# Patient Record
Sex: MALE | Race: ASIAN | Employment: OTHER | URBAN - METROPOLITAN AREA
[De-identification: names, ages, dates, MRNs, and addresses within clinical notes are randomized per-mention and may not be internally consistent; named-entity substitution may affect disease eponyms.]

---

## 2020-01-01 ENCOUNTER — MEDICAL CORRESPONDENCE (OUTPATIENT)
Dept: HEALTH INFORMATION MANAGEMENT | Facility: CLINIC | Age: 65
End: 2020-01-01

## 2020-01-01 ENCOUNTER — TRANSFERRED RECORDS (OUTPATIENT)
Dept: HEALTH INFORMATION MANAGEMENT | Facility: CLINIC | Age: 65
End: 2020-01-01

## 2020-01-01 LAB
ALT SERPL-CCNC: 25 U/L (ref 0–55)
AST SERPL-CCNC: 24 U/L (ref 10–40)
CREAT SERPL-MCNC: 0.88 MG/DL (ref 0.73–1.18)
GFR SERPL CREATININE-BSD FRML MDRD: >60 ML/MIN/1.73M2
GLUCOSE SERPL-MCNC: 156 MG/DL (ref 70–100)
HEP C HIM: NORMAL
POTASSIUM SERPL-SCNC: 3.6 MMOL/L (ref 3.5–5.1)

## 2021-01-01 ENCOUNTER — REFERRAL (OUTPATIENT)
Dept: TRANSPLANT | Facility: CLINIC | Age: 66
End: 2021-01-01

## 2021-01-01 ENCOUNTER — APPOINTMENT (OUTPATIENT)
Dept: GENERAL RADIOLOGY | Facility: CLINIC | Age: 66
DRG: 871 | End: 2021-01-01
Attending: INTERNAL MEDICINE
Payer: COMMERCIAL

## 2021-01-01 ENCOUNTER — DOCUMENTATION ONLY (OUTPATIENT)
Dept: TRANSPLANT | Facility: CLINIC | Age: 66
End: 2021-01-01

## 2021-01-01 ENCOUNTER — ANESTHESIA (OUTPATIENT)
Dept: INTENSIVE CARE | Facility: CLINIC | Age: 66
DRG: 871 | End: 2021-01-01
Payer: COMMERCIAL

## 2021-01-01 ENCOUNTER — APPOINTMENT (OUTPATIENT)
Dept: GENERAL RADIOLOGY | Facility: CLINIC | Age: 66
DRG: 871 | End: 2021-01-01
Attending: EMERGENCY MEDICINE
Payer: COMMERCIAL

## 2021-01-01 ENCOUNTER — HOSPITAL ENCOUNTER (OUTPATIENT)
Facility: CLINIC | Age: 66
Discharge: HOME | DRG: 871 | End: 2021-02-19
Attending: INTERNAL MEDICINE | Admitting: INTERNAL MEDICINE
Payer: COMMERCIAL

## 2021-01-01 ENCOUNTER — ANCILLARY PROCEDURE (OUTPATIENT)
Dept: MRI IMAGING | Facility: CLINIC | Age: 66
End: 2021-01-01
Attending: SURGERY
Payer: COMMERCIAL

## 2021-01-01 ENCOUNTER — APPOINTMENT (OUTPATIENT)
Dept: GENERAL RADIOLOGY | Facility: CLINIC | Age: 66
DRG: 871 | End: 2021-01-01
Payer: COMMERCIAL

## 2021-01-01 ENCOUNTER — PRE VISIT (OUTPATIENT)
Facility: CLINIC | Age: 66
End: 2021-01-01

## 2021-01-01 ENCOUNTER — APPOINTMENT (OUTPATIENT)
Dept: CT IMAGING | Facility: CLINIC | Age: 66
DRG: 871 | End: 2021-01-01
Payer: COMMERCIAL

## 2021-01-01 ENCOUNTER — ANESTHESIA EVENT (OUTPATIENT)
Dept: INTENSIVE CARE | Facility: CLINIC | Age: 66
DRG: 871 | End: 2021-01-01
Payer: COMMERCIAL

## 2021-01-01 ENCOUNTER — TELEPHONE (OUTPATIENT)
Dept: SURGERY | Facility: CLINIC | Age: 66
End: 2021-01-01

## 2021-01-01 ENCOUNTER — APPOINTMENT (OUTPATIENT)
Dept: CARDIOLOGY | Facility: CLINIC | Age: 66
DRG: 871 | End: 2021-01-01
Payer: COMMERCIAL

## 2021-01-01 ENCOUNTER — APPOINTMENT (OUTPATIENT)
Dept: LAB | Facility: CLINIC | Age: 66
End: 2021-01-01
Payer: COMMERCIAL

## 2021-01-01 ENCOUNTER — PRE VISIT (OUTPATIENT)
Dept: SURGERY | Facility: CLINIC | Age: 66
End: 2021-01-01

## 2021-01-01 ENCOUNTER — APPOINTMENT (OUTPATIENT)
Dept: CT IMAGING | Facility: CLINIC | Age: 66
DRG: 871 | End: 2021-01-01
Attending: INTERNAL MEDICINE
Payer: COMMERCIAL

## 2021-01-01 ENCOUNTER — HOSPITAL ENCOUNTER (INPATIENT)
Dept: GENERAL RADIOLOGY | Facility: CLINIC | Age: 66
End: 2021-01-12
Attending: INTERNAL MEDICINE
Payer: COMMERCIAL

## 2021-01-01 ENCOUNTER — TELEPHONE (OUTPATIENT)
Dept: TRANSPLANT | Facility: CLINIC | Age: 66
End: 2021-01-01

## 2021-01-01 ENCOUNTER — PATIENT OUTREACH (OUTPATIENT)
Dept: SURGERY | Facility: CLINIC | Age: 66
End: 2021-01-01

## 2021-01-01 ENCOUNTER — HEALTH MAINTENANCE LETTER (OUTPATIENT)
Age: 66
End: 2021-01-01

## 2021-01-01 ENCOUNTER — HOSPITAL ENCOUNTER (INPATIENT)
Facility: CLINIC | Age: 66
LOS: 3 days | DRG: 871 | End: 2021-02-24
Attending: INTERNAL MEDICINE | Admitting: HOSPITALIST
Payer: COMMERCIAL

## 2021-01-01 ENCOUNTER — PREP FOR PROCEDURE (OUTPATIENT)
Dept: GASTROENTEROLOGY | Facility: CLINIC | Age: 66
End: 2021-01-01

## 2021-01-01 ENCOUNTER — ANCILLARY PROCEDURE (OUTPATIENT)
Dept: CT IMAGING | Facility: CLINIC | Age: 66
End: 2021-01-01
Attending: SURGERY
Payer: COMMERCIAL

## 2021-01-01 ENCOUNTER — PATIENT OUTREACH (OUTPATIENT)
Dept: GASTROENTEROLOGY | Facility: CLINIC | Age: 66
End: 2021-01-01

## 2021-01-01 ENCOUNTER — VIRTUAL VISIT (OUTPATIENT)
Dept: SURGERY | Facility: CLINIC | Age: 66
End: 2021-01-01
Payer: COMMERCIAL

## 2021-01-01 ENCOUNTER — OFFICE VISIT (OUTPATIENT)
Dept: SURGERY | Facility: CLINIC | Age: 66
End: 2021-01-01
Attending: INTERNAL MEDICINE
Payer: COMMERCIAL

## 2021-01-01 ENCOUNTER — ANESTHESIA (OUTPATIENT)
Dept: SURGERY | Facility: CLINIC | Age: 66
DRG: 871 | End: 2021-01-01
Payer: COMMERCIAL

## 2021-01-01 ENCOUNTER — APPOINTMENT (OUTPATIENT)
Dept: INTERPRETER SERVICES | Facility: CLINIC | Age: 66
End: 2021-01-01
Payer: COMMERCIAL

## 2021-01-01 ENCOUNTER — ANESTHESIA EVENT (OUTPATIENT)
Dept: SURGERY | Facility: CLINIC | Age: 66
DRG: 871 | End: 2021-01-01
Payer: COMMERCIAL

## 2021-01-01 VITALS
OXYGEN SATURATION: 98 % | HEIGHT: 63 IN | RESPIRATION RATE: 25 BRPM | DIASTOLIC BLOOD PRESSURE: 44 MMHG | TEMPERATURE: 97.6 F | WEIGHT: 186.95 LBS | SYSTOLIC BLOOD PRESSURE: 77 MMHG | BODY MASS INDEX: 33.12 KG/M2

## 2021-01-01 VITALS
SYSTOLIC BLOOD PRESSURE: 137 MMHG | BODY MASS INDEX: 26.88 KG/M2 | WEIGHT: 151.68 LBS | HEART RATE: 66 BPM | RESPIRATION RATE: 16 BRPM | TEMPERATURE: 97.5 F | DIASTOLIC BLOOD PRESSURE: 79 MMHG | HEIGHT: 63 IN | OXYGEN SATURATION: 99 %

## 2021-01-01 VITALS
BODY MASS INDEX: 26.53 KG/M2 | RESPIRATION RATE: 16 BRPM | OXYGEN SATURATION: 96 % | DIASTOLIC BLOOD PRESSURE: 73 MMHG | HEIGHT: 64 IN | WEIGHT: 155.4 LBS | TEMPERATURE: 98.4 F | SYSTOLIC BLOOD PRESSURE: 137 MMHG | HEART RATE: 77 BPM

## 2021-01-01 VITALS — WEIGHT: 162 LBS | BODY MASS INDEX: 27.66 KG/M2 | HEIGHT: 64 IN

## 2021-01-01 DIAGNOSIS — N17.9 ACUTE RENAL FAILURE, UNSPECIFIED ACUTE RENAL FAILURE TYPE (H): ICD-10-CM

## 2021-01-01 DIAGNOSIS — R16.0 LIVER MASS: ICD-10-CM

## 2021-01-01 DIAGNOSIS — R16.0 LIVER MASS: Primary | ICD-10-CM

## 2021-01-01 DIAGNOSIS — C22.9 LIVER CANCER (H): Primary | ICD-10-CM

## 2021-01-01 DIAGNOSIS — K85.80 OTHER ACUTE PANCREATITIS, UNSPECIFIED COMPLICATION STATUS: ICD-10-CM

## 2021-01-01 DIAGNOSIS — C22.0 HCC (HEPATOCELLULAR CARCINOMA) (H): Primary | ICD-10-CM

## 2021-01-01 DIAGNOSIS — F17.200 TOBACCO USE DISORDER: ICD-10-CM

## 2021-01-01 DIAGNOSIS — Z11.59 ENCOUNTER FOR SCREENING FOR OTHER VIRAL DISEASES: ICD-10-CM

## 2021-01-01 DIAGNOSIS — F17.200 TOBACCO USE DISORDER: Primary | ICD-10-CM

## 2021-01-01 DIAGNOSIS — C22.1 CHOLANGIOCARCINOMA (H): ICD-10-CM

## 2021-01-01 DIAGNOSIS — Z11.52 ENCOUNTER FOR SCREENING LABORATORY TESTING FOR SEVERE ACUTE RESPIRATORY SYNDROME CORONAVIRUS 2 (SARS-COV-2): ICD-10-CM

## 2021-01-01 DIAGNOSIS — Z01.818 PREOP EXAMINATION: Primary | ICD-10-CM

## 2021-01-01 DIAGNOSIS — R16.0 LIVER MASS, LEFT LOBE: Primary | ICD-10-CM

## 2021-01-01 DIAGNOSIS — K83.09 CHOLANGITIS (H): ICD-10-CM

## 2021-01-01 DIAGNOSIS — Z11.59 ENCOUNTER FOR SCREENING FOR OTHER VIRAL DISEASES: Primary | ICD-10-CM

## 2021-01-01 DIAGNOSIS — C22.9 LIVER CANCER (H): ICD-10-CM

## 2021-01-01 LAB
ABO + RH BLD: NORMAL
ABO + RH BLD: NORMAL
ALBUMIN SERPL-MCNC: 1.1 G/DL (ref 3.4–5)
ALBUMIN SERPL-MCNC: 1.1 G/DL (ref 3.4–5)
ALBUMIN SERPL-MCNC: 1.4 G/DL (ref 3.4–5)
ALBUMIN SERPL-MCNC: 1.7 G/DL (ref 3.4–5)
ALBUMIN SERPL-MCNC: 2.2 G/DL (ref 3.4–5)
ALBUMIN SERPL-MCNC: 3.3 G/DL (ref 3.4–5)
ALBUMIN UR-MCNC: 30 MG/DL
ALP SERPL-CCNC: 126 U/L (ref 40–150)
ALP SERPL-CCNC: 133 U/L (ref 40–150)
ALP SERPL-CCNC: 161 U/L (ref 40–150)
ALP SERPL-CCNC: 243 U/L (ref 40–150)
ALP SERPL-CCNC: 291 U/L (ref 40–150)
ALP SERPL-CCNC: 413 U/L (ref 40–150)
ALT SERPL W P-5'-P-CCNC: 1020 U/L (ref 0–70)
ALT SERPL W P-5'-P-CCNC: 2123 U/L (ref 0–70)
ALT SERPL W P-5'-P-CCNC: 254 U/L (ref 0–70)
ALT SERPL W P-5'-P-CCNC: 76 U/L (ref 0–70)
ALT SERPL W P-5'-P-CCNC: 80 U/L (ref 0–70)
ALT SERPL W P-5'-P-CCNC: 86 U/L (ref 0–70)
AMORPH CRY #/AREA URNS HPF: ABNORMAL /HPF
AMYLASE SERPL-CCNC: 89 U/L (ref 30–110)
ANION GAP SERPL CALCULATED.3IONS-SCNC: 11 MMOL/L (ref 3–14)
ANION GAP SERPL CALCULATED.3IONS-SCNC: 12 MMOL/L (ref 3–14)
ANION GAP SERPL CALCULATED.3IONS-SCNC: 13 MMOL/L (ref 3–14)
ANION GAP SERPL CALCULATED.3IONS-SCNC: 14 MMOL/L (ref 3–14)
ANION GAP SERPL CALCULATED.3IONS-SCNC: 14 MMOL/L (ref 3–14)
ANION GAP SERPL CALCULATED.3IONS-SCNC: 15 MMOL/L (ref 3–14)
ANION GAP SERPL CALCULATED.3IONS-SCNC: 15 MMOL/L (ref 3–14)
ANION GAP SERPL CALCULATED.3IONS-SCNC: 17 MMOL/L (ref 3–14)
ANION GAP SERPL CALCULATED.3IONS-SCNC: 17 MMOL/L (ref 3–14)
ANION GAP SERPL CALCULATED.3IONS-SCNC: 6 MMOL/L (ref 3–14)
ANION GAP SERPL CALCULATED.3IONS-SCNC: 9 MMOL/L (ref 3–14)
ANION GAP SERPL CALCULATED.3IONS-SCNC: 9 MMOL/L (ref 3–14)
APPEARANCE UR: ABNORMAL
APTT PPP: 41 SEC (ref 22–37)
AST SERPL W P-5'-P-CCNC: 112 U/L (ref 0–45)
AST SERPL W P-5'-P-CCNC: 139 U/L (ref 0–45)
AST SERPL W P-5'-P-CCNC: 139 U/L (ref 0–45)
AST SERPL W P-5'-P-CCNC: 159 U/L (ref 0–45)
AST SERPL W P-5'-P-CCNC: 2194 U/L (ref 0–45)
AST SERPL W P-5'-P-CCNC: 5930 U/L (ref 0–45)
BACTERIA SPEC CULT: NO GROWTH
BASE DEFICIT BLDA-SCNC: 10.3 MMOL/L
BASE DEFICIT BLDA-SCNC: 10.9 MMOL/L
BASE DEFICIT BLDA-SCNC: 12.2 MMOL/L
BASE DEFICIT BLDA-SCNC: 12.4 MMOL/L
BASE DEFICIT BLDA-SCNC: 12.7 MMOL/L
BASE DEFICIT BLDA-SCNC: 14.2 MMOL/L
BASE DEFICIT BLDA-SCNC: 14.8 MMOL/L
BASE DEFICIT BLDA-SCNC: 15.2 MMOL/L
BASE DEFICIT BLDA-SCNC: 15.4 MMOL/L
BASE DEFICIT BLDA-SCNC: 15.8 MMOL/L
BASE DEFICIT BLDA-SCNC: 4.9 MMOL/L
BASE DEFICIT BLDA-SCNC: 8.3 MMOL/L
BASE DEFICIT BLDA-SCNC: 8.4 MMOL/L
BASE DEFICIT BLDA-SCNC: 9 MMOL/L
BASE DEFICIT BLDA-SCNC: 9.5 MMOL/L
BASE DEFICIT BLDA-SCNC: 9.5 MMOL/L
BASE DEFICIT BLDA-SCNC: 9.9 MMOL/L
BASE DEFICIT BLDV-SCNC: 10.3 MMOL/L
BASE DEFICIT BLDV-SCNC: 11.6 MMOL/L
BASE DEFICIT BLDV-SCNC: 11.8 MMOL/L
BASE DEFICIT BLDV-SCNC: 12.4 MMOL/L
BASE DEFICIT BLDV-SCNC: 14.6 MMOL/L
BASE DEFICIT BLDV-SCNC: 16.9 MMOL/L
BASE DEFICIT BLDV-SCNC: 7.5 MMOL/L
BASE DEFICIT BLDV-SCNC: 7.8 MMOL/L
BASE DEFICIT BLDV-SCNC: 8.5 MMOL/L
BASOPHILS # BLD AUTO: 0 10E9/L (ref 0–0.2)
BASOPHILS # BLD AUTO: 0 10E9/L (ref 0–0.2)
BASOPHILS NFR BLD AUTO: 0 %
BASOPHILS NFR BLD AUTO: 0.3 %
BILIRUB DIRECT SERPL-MCNC: 6.2 MG/DL (ref 0–0.2)
BILIRUB SERPL-MCNC: 10.3 MG/DL (ref 0.2–1.3)
BILIRUB SERPL-MCNC: 5.1 MG/DL (ref 0.2–1.3)
BILIRUB SERPL-MCNC: 7.5 MG/DL (ref 0.2–1.3)
BILIRUB SERPL-MCNC: 7.6 MG/DL (ref 0.2–1.3)
BILIRUB SERPL-MCNC: 8.6 MG/DL (ref 0.2–1.3)
BILIRUB SERPL-MCNC: 9.2 MG/DL (ref 0.2–1.3)
BILIRUB UR QL STRIP: ABNORMAL
BLD GP AB SCN SERPL QL: NORMAL
BLOOD BANK CMNT PATIENT-IMP: NORMAL
BUN SERPL-MCNC: 12 MG/DL (ref 7–30)
BUN SERPL-MCNC: 26 MG/DL (ref 7–30)
BUN SERPL-MCNC: 28 MG/DL (ref 7–30)
BUN SERPL-MCNC: 29 MG/DL (ref 7–30)
BUN SERPL-MCNC: 30 MG/DL (ref 7–30)
BUN SERPL-MCNC: 32 MG/DL (ref 7–30)
BUN SERPL-MCNC: 36 MG/DL (ref 7–30)
BUN SERPL-MCNC: 38 MG/DL (ref 7–30)
BUN SERPL-MCNC: 42 MG/DL (ref 7–30)
BUN SERPL-MCNC: 43 MG/DL (ref 7–30)
BUN SERPL-MCNC: 45 MG/DL (ref 7–30)
BUN SERPL-MCNC: 45 MG/DL (ref 7–30)
BUN SERPL-MCNC: 46 MG/DL (ref 7–30)
BUN SERPL-MCNC: 48 MG/DL (ref 7–30)
CA-I BLD-MCNC: 2.2 MG/DL (ref 4.4–5.2)
CA-I BLD-MCNC: 3.2 MG/DL (ref 4.4–5.2)
CA-I BLD-MCNC: 3.3 MG/DL (ref 4.4–5.2)
CA-I BLD-MCNC: 3.6 MG/DL (ref 4.4–5.2)
CA-I BLD-MCNC: 4 MG/DL (ref 4.4–5.2)
CA-I BLD-MCNC: 4 MG/DL (ref 4.4–5.2)
CA-I BLD-MCNC: 4.2 MG/DL (ref 4.4–5.2)
CA-I BLD-MCNC: 4.3 MG/DL (ref 4.4–5.2)
CA-I BLD-MCNC: 4.3 MG/DL (ref 4.4–5.2)
CA-I SERPL ISE-MCNC: 3.8 MG/DL (ref 4.4–5.2)
CA-I SERPL ISE-MCNC: 4 MG/DL (ref 4.4–5.2)
CA-I SERPL ISE-MCNC: 4.1 MG/DL (ref 4.4–5.2)
CA-I SERPL ISE-MCNC: 4.2 MG/DL (ref 4.4–5.2)
CALCIUM SERPL-MCNC: 6 MG/DL (ref 8.5–10.1)
CALCIUM SERPL-MCNC: 6.2 MG/DL (ref 8.5–10.1)
CALCIUM SERPL-MCNC: 6.5 MG/DL (ref 8.5–10.1)
CALCIUM SERPL-MCNC: 6.6 MG/DL (ref 8.5–10.1)
CALCIUM SERPL-MCNC: 7 MG/DL (ref 8.5–10.1)
CALCIUM SERPL-MCNC: 7.4 MG/DL (ref 8.5–10.1)
CALCIUM SERPL-MCNC: 7.5 MG/DL (ref 8.5–10.1)
CALCIUM SERPL-MCNC: 7.5 MG/DL (ref 8.5–10.1)
CALCIUM SERPL-MCNC: 7.6 MG/DL (ref 8.5–10.1)
CALCIUM SERPL-MCNC: 7.8 MG/DL (ref 8.5–10.1)
CALCIUM SERPL-MCNC: 7.8 MG/DL (ref 8.5–10.1)
CALCIUM SERPL-MCNC: 7.9 MG/DL (ref 8.5–10.1)
CALCIUM SERPL-MCNC: 8 MG/DL (ref 8.5–10.1)
CALCIUM SERPL-MCNC: 9.1 MG/DL (ref 8.5–10.1)
CHLORIDE SERPL-SCNC: 100 MMOL/L (ref 94–109)
CHLORIDE SERPL-SCNC: 102 MMOL/L (ref 94–109)
CHLORIDE SERPL-SCNC: 104 MMOL/L (ref 94–109)
CHLORIDE SERPL-SCNC: 107 MMOL/L (ref 94–109)
CHLORIDE SERPL-SCNC: 108 MMOL/L (ref 94–109)
CHLORIDE SERPL-SCNC: 109 MMOL/L (ref 94–109)
CHLORIDE SERPL-SCNC: 109 MMOL/L (ref 94–109)
CHLORIDE SERPL-SCNC: 110 MMOL/L (ref 94–109)
CHLORIDE SERPL-SCNC: 98 MMOL/L (ref 94–109)
CK SERPL-CCNC: 1600 U/L (ref 30–300)
CK SERPL-CCNC: 2273 U/L (ref 30–300)
CK SERPL-CCNC: 2802 U/L (ref 30–300)
CK SERPL-CCNC: NORMAL U/L (ref 30–300)
CO2 SERPL-SCNC: 12 MMOL/L (ref 20–32)
CO2 SERPL-SCNC: 12 MMOL/L (ref 20–32)
CO2 SERPL-SCNC: 13 MMOL/L (ref 20–32)
CO2 SERPL-SCNC: 13 MMOL/L (ref 20–32)
CO2 SERPL-SCNC: 14 MMOL/L (ref 20–32)
CO2 SERPL-SCNC: 14 MMOL/L (ref 20–32)
CO2 SERPL-SCNC: 15 MMOL/L (ref 20–32)
CO2 SERPL-SCNC: 16 MMOL/L (ref 20–32)
CO2 SERPL-SCNC: 16 MMOL/L (ref 20–32)
CO2 SERPL-SCNC: 17 MMOL/L (ref 20–32)
CO2 SERPL-SCNC: 18 MMOL/L (ref 20–32)
CO2 SERPL-SCNC: 20 MMOL/L (ref 20–32)
CO2 SERPL-SCNC: 20 MMOL/L (ref 20–32)
CO2 SERPL-SCNC: 25 MMOL/L (ref 20–32)
COLOR UR AUTO: ABNORMAL
COPATH REPORT: NORMAL
CREAT SERPL-MCNC: 0.96 MG/DL (ref 0.66–1.25)
CREAT SERPL-MCNC: 1 MG/DL (ref 0.66–1.25)
CREAT SERPL-MCNC: 1.69 MG/DL (ref 0.66–1.25)
CREAT SERPL-MCNC: 1.76 MG/DL (ref 0.66–1.25)
CREAT SERPL-MCNC: 1.85 MG/DL (ref 0.66–1.25)
CREAT SERPL-MCNC: 1.89 MG/DL (ref 0.66–1.25)
CREAT SERPL-MCNC: 2.05 MG/DL (ref 0.66–1.25)
CREAT SERPL-MCNC: 2.16 MG/DL (ref 0.66–1.25)
CREAT SERPL-MCNC: 2.37 MG/DL (ref 0.66–1.25)
CREAT SERPL-MCNC: 2.65 MG/DL (ref 0.66–1.25)
CREAT SERPL-MCNC: 2.65 MG/DL (ref 0.66–1.25)
CREAT SERPL-MCNC: 2.88 MG/DL (ref 0.66–1.25)
CREAT SERPL-MCNC: 2.92 MG/DL (ref 0.66–1.25)
CREAT SERPL-MCNC: 3.08 MG/DL (ref 0.66–1.25)
CREAT SERPL-MCNC: 3.63 MG/DL (ref 0.66–1.25)
CREAT UR-MCNC: 150 MG/DL
CRP SERPL-MCNC: 310 MG/L (ref 0–8)
CRP SERPL-MCNC: 350 MG/L (ref 0–8)
DIFFERENTIAL METHOD BLD: ABNORMAL
DIFFERENTIAL METHOD BLD: ABNORMAL
EOSINOPHIL # BLD AUTO: 0 10E9/L (ref 0–0.7)
EOSINOPHIL # BLD AUTO: 0 10E9/L (ref 0–0.7)
EOSINOPHIL NFR BLD AUTO: 0 %
EOSINOPHIL NFR BLD AUTO: 0.6 %
ERCP: NORMAL
ERYTHROCYTE [DISTWIDTH] IN BLOOD BY AUTOMATED COUNT: 12.5 % (ref 10–15)
ERYTHROCYTE [DISTWIDTH] IN BLOOD BY AUTOMATED COUNT: 14.4 % (ref 10–15)
ERYTHROCYTE [DISTWIDTH] IN BLOOD BY AUTOMATED COUNT: 14.6 % (ref 10–15)
ERYTHROCYTE [DISTWIDTH] IN BLOOD BY AUTOMATED COUNT: 15.1 % (ref 10–15)
ERYTHROCYTE [DISTWIDTH] IN BLOOD BY AUTOMATED COUNT: 15.3 % (ref 10–15)
FIBRINOGEN PPP-MCNC: 599 MG/DL (ref 200–420)
FIBRINOGEN PPP-MCNC: 638 MG/DL (ref 200–420)
FLUAV RNA RESP QL NAA+PROBE: NEGATIVE
FLUBV RNA RESP QL NAA+PROBE: NEGATIVE
GFR SERPL CREATININE-BSD FRML MDRD: 16 ML/MIN/{1.73_M2}
GFR SERPL CREATININE-BSD FRML MDRD: 20 ML/MIN/{1.73_M2}
GFR SERPL CREATININE-BSD FRML MDRD: 21 ML/MIN/{1.73_M2}
GFR SERPL CREATININE-BSD FRML MDRD: 21 ML/MIN/{1.73_M2}
GFR SERPL CREATININE-BSD FRML MDRD: 24 ML/MIN/{1.73_M2}
GFR SERPL CREATININE-BSD FRML MDRD: 24 ML/MIN/{1.73_M2}
GFR SERPL CREATININE-BSD FRML MDRD: 27 ML/MIN/{1.73_M2}
GFR SERPL CREATININE-BSD FRML MDRD: 30 ML/MIN/{1.73_M2}
GFR SERPL CREATININE-BSD FRML MDRD: 32 ML/MIN/{1.73_M2}
GFR SERPL CREATININE-BSD FRML MDRD: 36 ML/MIN/{1.73_M2}
GFR SERPL CREATININE-BSD FRML MDRD: 37 ML/MIN/{1.73_M2}
GFR SERPL CREATININE-BSD FRML MDRD: 39 ML/MIN/{1.73_M2}
GFR SERPL CREATININE-BSD FRML MDRD: 41 ML/MIN/{1.73_M2}
GFR SERPL CREATININE-BSD FRML MDRD: 77 ML/MIN/{1.73_M2}
GFR SERPL CREATININE-BSD FRML MDRD: 81 ML/MIN/{1.73_M2}
GLUCOSE BLDC GLUCOMTR-MCNC: 128 MG/DL (ref 70–99)
GLUCOSE SERPL-MCNC: 100 MG/DL (ref 70–99)
GLUCOSE SERPL-MCNC: 108 MG/DL (ref 70–99)
GLUCOSE SERPL-MCNC: 110 MG/DL (ref 70–99)
GLUCOSE SERPL-MCNC: 110 MG/DL (ref 70–99)
GLUCOSE SERPL-MCNC: 115 MG/DL (ref 70–99)
GLUCOSE SERPL-MCNC: 117 MG/DL (ref 70–99)
GLUCOSE SERPL-MCNC: 120 MG/DL (ref 70–99)
GLUCOSE SERPL-MCNC: 126 MG/DL (ref 70–99)
GLUCOSE SERPL-MCNC: 126 MG/DL (ref 70–99)
GLUCOSE SERPL-MCNC: 152 MG/DL (ref 70–99)
GLUCOSE SERPL-MCNC: 67 MG/DL (ref 70–99)
GLUCOSE SERPL-MCNC: 79 MG/DL (ref 70–99)
GLUCOSE SERPL-MCNC: 94 MG/DL (ref 70–99)
GLUCOSE SERPL-MCNC: 95 MG/DL (ref 70–99)
GLUCOSE UR STRIP-MCNC: NEGATIVE MG/DL
GRAM STN SPEC: NORMAL
GRAM STN SPEC: NORMAL
GRAN CASTS #/AREA URNS LPF: 7 /LPF
HBA1C MFR BLD: 5.8 % (ref 0–5.6)
HCO3 BLD-SCNC: 10 MMOL/L (ref 21–28)
HCO3 BLD-SCNC: 11 MMOL/L (ref 21–28)
HCO3 BLD-SCNC: 12 MMOL/L (ref 21–28)
HCO3 BLD-SCNC: 12 MMOL/L (ref 21–28)
HCO3 BLD-SCNC: 13 MMOL/L (ref 21–28)
HCO3 BLD-SCNC: 13 MMOL/L (ref 21–28)
HCO3 BLD-SCNC: 14 MMOL/L (ref 21–28)
HCO3 BLD-SCNC: 16 MMOL/L (ref 21–28)
HCO3 BLD-SCNC: 17 MMOL/L (ref 21–28)
HCO3 BLD-SCNC: 17 MMOL/L (ref 21–28)
HCO3 BLD-SCNC: 20 MMOL/L (ref 21–28)
HCO3 BLDV-SCNC: 11 MMOL/L (ref 21–28)
HCO3 BLDV-SCNC: 13 MMOL/L (ref 21–28)
HCO3 BLDV-SCNC: 14 MMOL/L (ref 21–28)
HCO3 BLDV-SCNC: 15 MMOL/L (ref 21–28)
HCO3 BLDV-SCNC: 15 MMOL/L (ref 21–28)
HCO3 BLDV-SCNC: 16 MMOL/L (ref 21–28)
HCO3 BLDV-SCNC: 18 MMOL/L (ref 21–28)
HCT VFR BLD AUTO: 27.7 % (ref 40–53)
HCT VFR BLD AUTO: 27.7 % (ref 40–53)
HCT VFR BLD AUTO: 34.4 % (ref 40–53)
HCT VFR BLD AUTO: 38.7 % (ref 40–53)
HCT VFR BLD AUTO: 41.2 % (ref 40–53)
HGB BLD-MCNC: 11.8 G/DL (ref 13.3–17.7)
HGB BLD-MCNC: 12.7 G/DL (ref 13.3–17.7)
HGB BLD-MCNC: 13.5 G/DL (ref 13.3–17.7)
HGB BLD-MCNC: 9.5 G/DL (ref 13.3–17.7)
HGB BLD-MCNC: 9.8 G/DL (ref 13.3–17.7)
HGB UR QL STRIP: ABNORMAL
HYALINE CASTS #/AREA URNS LPF: 20 /LPF (ref 0–2)
IMM GRANULOCYTES # BLD: 0 10E9/L (ref 0–0.4)
IMM GRANULOCYTES NFR BLD: 0.3 %
INR PPP: 1.41 (ref 0.86–1.14)
INR PPP: 2.95 (ref 0.86–1.14)
INTERPRETATION ECG - MUSE: NORMAL
KETONES UR STRIP-MCNC: NEGATIVE MG/DL
LABORATORY COMMENT REPORT: NORMAL
LABORATORY COMMENT REPORT: NORMAL
LACTATE BLD-SCNC: 10.1 MMOL/L (ref 0.7–2)
LACTATE BLD-SCNC: 10.3 MMOL/L (ref 0.7–2)
LACTATE BLD-SCNC: 10.6 MMOL/L (ref 0.7–2)
LACTATE BLD-SCNC: 12 MMOL/L (ref 0.7–2)
LACTATE BLD-SCNC: 12.4 MMOL/L (ref 0.7–2)
LACTATE BLD-SCNC: 13.4 MMOL/L (ref 0.7–2)
LACTATE BLD-SCNC: 3.2 MMOL/L (ref 0.7–2)
LACTATE BLD-SCNC: 4.3 MMOL/L (ref 0.7–2)
LACTATE BLD-SCNC: 4.8 MMOL/L (ref 0.7–2)
LACTATE BLD-SCNC: 5 MMOL/L (ref 0.7–2)
LACTATE BLD-SCNC: 5.2 MMOL/L (ref 0.7–2)
LACTATE BLD-SCNC: 5.2 MMOL/L (ref 0.7–2)
LACTATE BLD-SCNC: 5.3 MMOL/L (ref 0.7–2)
LACTATE BLD-SCNC: 5.5 MMOL/L (ref 0.7–2)
LACTATE BLD-SCNC: 5.8 MMOL/L (ref 0.7–2)
LACTATE BLD-SCNC: 5.9 MMOL/L (ref 0.7–2)
LACTATE BLD-SCNC: 6 MMOL/L (ref 0.7–2)
LACTATE BLD-SCNC: 6.1 MMOL/L (ref 0.7–2)
LACTATE BLD-SCNC: 6.7 MMOL/L (ref 0.7–2)
LACTATE BLD-SCNC: 7.5 MMOL/L (ref 0.7–2)
LACTATE BLD-SCNC: 7.6 MMOL/L (ref 0.7–2)
LACTATE BLD-SCNC: 8.2 MMOL/L (ref 0.7–2)
LACTATE BLD-SCNC: 8.4 MMOL/L (ref 0.7–2)
LACTATE BLD-SCNC: NORMAL MMOL/L (ref 0.7–2)
LACTATE BLD-SCNC: NORMAL MMOL/L (ref 0.7–2)
LEUKOCYTE ESTERASE UR QL STRIP: NEGATIVE
LIPASE SERPL-CCNC: 1074 U/L (ref 73–393)
LIPASE SERPL-CCNC: 4183 U/L (ref 73–393)
LYMPHOCYTES # BLD AUTO: 1.2 10E9/L (ref 0.8–5.3)
LYMPHOCYTES # BLD AUTO: 1.3 10E9/L (ref 0.8–5.3)
LYMPHOCYTES NFR BLD AUTO: 18.3 %
LYMPHOCYTES NFR BLD AUTO: 19 %
MAGNESIUM SERPL-MCNC: 1.5 MG/DL (ref 1.6–2.3)
MAGNESIUM SERPL-MCNC: 2.1 MG/DL (ref 1.6–2.3)
MAGNESIUM SERPL-MCNC: 2.3 MG/DL (ref 1.6–2.3)
MAGNESIUM SERPL-MCNC: 2.4 MG/DL (ref 1.6–2.3)
MAGNESIUM SERPL-MCNC: 2.5 MG/DL (ref 1.6–2.3)
MAGNESIUM SERPL-MCNC: 2.5 MG/DL (ref 1.6–2.3)
MAGNESIUM SERPL-MCNC: 2.8 MG/DL (ref 1.6–2.3)
MCH RBC QN AUTO: 30.7 PG (ref 26.5–33)
MCH RBC QN AUTO: 31.2 PG (ref 26.5–33)
MCH RBC QN AUTO: 31.3 PG (ref 26.5–33)
MCH RBC QN AUTO: 31.6 PG (ref 26.5–33)
MCH RBC QN AUTO: 31.7 PG (ref 26.5–33)
MCHC RBC AUTO-ENTMCNC: 32.8 G/DL (ref 31.5–36.5)
MCHC RBC AUTO-ENTMCNC: 32.8 G/DL (ref 31.5–36.5)
MCHC RBC AUTO-ENTMCNC: 34.3 G/DL (ref 31.5–36.5)
MCHC RBC AUTO-ENTMCNC: 34.3 G/DL (ref 31.5–36.5)
MCHC RBC AUTO-ENTMCNC: 35.4 G/DL (ref 31.5–36.5)
MCV RBC AUTO: 87 FL (ref 78–100)
MCV RBC AUTO: 91 FL (ref 78–100)
MCV RBC AUTO: 92 FL (ref 78–100)
MCV RBC AUTO: 95 FL (ref 78–100)
MCV RBC AUTO: 96 FL (ref 78–100)
METAMYELOCYTES # BLD: 0.7 10E9/L
METAMYELOCYTES NFR BLD MANUAL: 10.5 %
MONOCYTES # BLD AUTO: 0.8 10E9/L (ref 0–1.3)
MONOCYTES # BLD AUTO: 0.9 10E9/L (ref 0–1.3)
MONOCYTES NFR BLD AUTO: 12.1 %
MONOCYTES NFR BLD AUTO: 13.5 %
MUCOUS THREADS #/AREA URNS LPF: PRESENT /LPF
MYELOCYTES # BLD: 0.3 10E9/L
MYELOCYTES NFR BLD MANUAL: 4.5 %
NEUTROPHILS # BLD AUTO: 3.6 10E9/L (ref 1.6–8.3)
NEUTROPHILS # BLD AUTO: 4.3 10E9/L (ref 1.6–8.3)
NEUTROPHILS NFR BLD AUTO: 52.5 %
NEUTROPHILS NFR BLD AUTO: 68.4 %
NITRATE UR QL: NEGATIVE
NRBC # BLD AUTO: 0 10*3/UL
NRBC BLD AUTO-RTO: 0 /100
O2/TOTAL GAS SETTING VFR VENT: 40 %
O2/TOTAL GAS SETTING VFR VENT: 50 %
O2/TOTAL GAS SETTING VFR VENT: 60 %
O2/TOTAL GAS SETTING VFR VENT: 65 %
O2/TOTAL GAS SETTING VFR VENT: 65 %
O2/TOTAL GAS SETTING VFR VENT: 70 %
OSMOLALITY SERPL: NORMAL MMOL/KG (ref 280–301)
OSMOLALITY UR: 442 MMOL/KG (ref 100–1200)
OXYHGB MFR BLDV: 59 %
OXYHGB MFR BLDV: 59 %
OXYHGB MFR BLDV: 63 %
OXYHGB MFR BLDV: 64 %
OXYHGB MFR BLDV: 65 %
OXYHGB MFR BLDV: 67 %
OXYHGB MFR BLDV: 67 %
OXYHGB MFR BLDV: 68 %
OXYHGB MFR BLDV: 70 %
PCO2 BLD: 23 MM HG (ref 35–45)
PCO2 BLD: 25 MM HG (ref 35–45)
PCO2 BLD: 26 MM HG (ref 35–45)
PCO2 BLD: 27 MM HG (ref 35–45)
PCO2 BLD: 30 MM HG (ref 35–45)
PCO2 BLD: 30 MM HG (ref 35–45)
PCO2 BLD: 31 MM HG (ref 35–45)
PCO2 BLD: 32 MM HG (ref 35–45)
PCO2 BLD: 33 MM HG (ref 35–45)
PCO2 BLD: 33 MM HG (ref 35–45)
PCO2 BLD: 34 MM HG (ref 35–45)
PCO2 BLD: 34 MM HG (ref 35–45)
PCO2 BLD: 35 MM HG (ref 35–45)
PCO2 BLD: 36 MM HG (ref 35–45)
PCO2 BLDV: 31 MM HG (ref 40–50)
PCO2 BLDV: 31 MM HG (ref 40–50)
PCO2 BLDV: 36 MM HG (ref 40–50)
PCO2 BLDV: 36 MM HG (ref 40–50)
PCO2 BLDV: 37 MM HG (ref 40–50)
PCO2 BLDV: 38 MM HG (ref 40–50)
PH BLD: 7.17 PH (ref 7.35–7.45)
PH BLD: 7.19 PH (ref 7.35–7.45)
PH BLD: 7.2 PH (ref 7.35–7.45)
PH BLD: 7.23 PH (ref 7.35–7.45)
PH BLD: 7.24 PH (ref 7.35–7.45)
PH BLD: 7.24 PH (ref 7.35–7.45)
PH BLD: 7.25 PH (ref 7.35–7.45)
PH BLD: 7.27 PH (ref 7.35–7.45)
PH BLD: 7.29 PH (ref 7.35–7.45)
PH BLD: 7.31 PH (ref 7.35–7.45)
PH BLD: 7.31 PH (ref 7.35–7.45)
PH BLD: 7.32 PH (ref 7.35–7.45)
PH BLD: 7.34 PH (ref 7.35–7.45)
PH BLD: 7.34 PH (ref 7.35–7.45)
PH BLD: 7.37 PH (ref 7.35–7.45)
PH BLDV: 7.15 PH (ref 7.32–7.43)
PH BLDV: 7.15 PH (ref 7.32–7.43)
PH BLDV: 7.21 PH (ref 7.32–7.43)
PH BLDV: 7.22 PH (ref 7.32–7.43)
PH BLDV: 7.26 PH (ref 7.32–7.43)
PH BLDV: 7.27 PH (ref 7.32–7.43)
PH BLDV: 7.29 PH (ref 7.32–7.43)
PH BLDV: 7.3 PH (ref 7.32–7.43)
PH BLDV: 7.31 PH (ref 7.32–7.43)
PH UR STRIP: 5.5 PH (ref 5–7)
PHOSPHATE SERPL-MCNC: 5.2 MG/DL (ref 2.5–4.5)
PHOSPHATE SERPL-MCNC: 5.6 MG/DL (ref 2.5–4.5)
PHOSPHATE SERPL-MCNC: 6.1 MG/DL (ref 2.5–4.5)
PHOSPHATE SERPL-MCNC: 7 MG/DL (ref 2.5–4.5)
PHOSPHATE SERPL-MCNC: 7.7 MG/DL (ref 2.5–4.5)
PHOSPHATE SERPL-MCNC: 8.4 MG/DL (ref 2.5–4.5)
PLATELET # BLD AUTO: 169 10E9/L (ref 150–450)
PLATELET # BLD AUTO: 222 10E9/L (ref 150–450)
PLATELET # BLD AUTO: 244 10E9/L (ref 150–450)
PLATELET # BLD AUTO: 329 10E9/L (ref 150–450)
PLATELET # BLD AUTO: 339 10E9/L (ref 150–450)
PO2 BLD: 111 MM HG (ref 80–105)
PO2 BLD: 113 MM HG (ref 80–105)
PO2 BLD: 125 MM HG (ref 80–105)
PO2 BLD: 55 MM HG (ref 80–105)
PO2 BLD: 66 MM HG (ref 80–105)
PO2 BLD: 67 MM HG (ref 80–105)
PO2 BLD: 69 MM HG (ref 80–105)
PO2 BLD: 72 MM HG (ref 80–105)
PO2 BLD: 74 MM HG (ref 80–105)
PO2 BLD: 75 MM HG (ref 80–105)
PO2 BLD: 77 MM HG (ref 80–105)
PO2 BLD: 78 MM HG (ref 80–105)
PO2 BLD: 84 MM HG (ref 80–105)
PO2 BLD: 84 MM HG (ref 80–105)
PO2 BLD: 87 MM HG (ref 80–105)
PO2 BLD: 88 MM HG (ref 80–105)
PO2 BLD: 94 MM HG (ref 80–105)
PO2 BLDV: 36 MM HG (ref 25–47)
PO2 BLDV: 37 MM HG (ref 25–47)
PO2 BLDV: 37 MM HG (ref 25–47)
PO2 BLDV: 38 MM HG (ref 25–47)
PO2 BLDV: 39 MM HG (ref 25–47)
PO2 BLDV: 41 MM HG (ref 25–47)
PO2 BLDV: 45 MM HG (ref 25–47)
PO2 BLDV: 46 MM HG (ref 25–47)
PO2 BLDV: 47 MM HG (ref 25–47)
POTASSIUM BLD-SCNC: 7.8 MMOL/L (ref 3.4–5.3)
POTASSIUM SERPL-SCNC: 3.9 MMOL/L (ref 3.4–5.3)
POTASSIUM SERPL-SCNC: 4 MMOL/L (ref 3.4–5.3)
POTASSIUM SERPL-SCNC: 4 MMOL/L (ref 3.4–5.3)
POTASSIUM SERPL-SCNC: 4.4 MMOL/L (ref 3.4–5.3)
POTASSIUM SERPL-SCNC: 4.6 MMOL/L (ref 3.4–5.3)
POTASSIUM SERPL-SCNC: 4.8 MMOL/L (ref 3.4–5.3)
POTASSIUM SERPL-SCNC: 4.8 MMOL/L (ref 3.4–5.3)
POTASSIUM SERPL-SCNC: 5.2 MMOL/L (ref 3.4–5.3)
POTASSIUM SERPL-SCNC: 5.6 MMOL/L (ref 3.4–5.3)
POTASSIUM SERPL-SCNC: 5.7 MMOL/L (ref 3.4–5.3)
POTASSIUM SERPL-SCNC: 5.8 MMOL/L (ref 3.4–5.3)
POTASSIUM SERPL-SCNC: 5.8 MMOL/L (ref 3.4–5.3)
POTASSIUM SERPL-SCNC: 5.9 MMOL/L (ref 3.4–5.3)
POTASSIUM SERPL-SCNC: 5.9 MMOL/L (ref 3.4–5.3)
POTASSIUM SERPL-SCNC: 6.1 MMOL/L (ref 3.4–5.3)
POTASSIUM SERPL-SCNC: NORMAL MMOL/L (ref 3.4–5.3)
PROCALCITONIN SERPL-MCNC: 39.74 NG/ML
PROT SERPL-MCNC: 3.9 G/DL (ref 6.8–8.8)
PROT SERPL-MCNC: 4.2 G/DL (ref 6.8–8.8)
PROT SERPL-MCNC: 4.7 G/DL (ref 6.8–8.8)
PROT SERPL-MCNC: 5.2 G/DL (ref 6.8–8.8)
PROT SERPL-MCNC: 6.1 G/DL (ref 6.8–8.8)
PROT SERPL-MCNC: 7.7 G/DL (ref 6.8–8.8)
RBC # BLD AUTO: 3 10E12/L (ref 4.4–5.9)
RBC # BLD AUTO: 3.19 10E12/L (ref 4.4–5.9)
RBC # BLD AUTO: 3.78 10E12/L (ref 4.4–5.9)
RBC # BLD AUTO: 4.02 10E12/L (ref 4.4–5.9)
RBC # BLD AUTO: 4.32 10E12/L (ref 4.4–5.9)
RBC #/AREA URNS AUTO: 1 /HPF (ref 0–2)
RSV RNA SPEC QL NAA+PROBE: NEGATIVE
SARS-COV-2 RNA RESP QL NAA+PROBE: NEGATIVE
SARS-COV-2 RNA RESP QL NAA+PROBE: NEGATIVE
SARS-COV-2 RNA RESP QL NAA+PROBE: NORMAL
SODIUM SERPL-SCNC: 128 MMOL/L (ref 133–144)
SODIUM SERPL-SCNC: 133 MMOL/L (ref 133–144)
SODIUM SERPL-SCNC: 134 MMOL/L (ref 133–144)
SODIUM SERPL-SCNC: 135 MMOL/L (ref 133–144)
SODIUM SERPL-SCNC: 136 MMOL/L (ref 133–144)
SODIUM SERPL-SCNC: 137 MMOL/L (ref 133–144)
SODIUM SERPL-SCNC: 138 MMOL/L (ref 133–144)
SODIUM SERPL-SCNC: 139 MMOL/L (ref 133–144)
SODIUM SERPL-SCNC: NORMAL MMOL/L (ref 133–144)
SODIUM UR-SCNC: 16 MMOL/L
SOURCE: ABNORMAL
SP GR UR STRIP: 1.02 (ref 1–1.03)
SPECIMEN EXP DATE BLD: NORMAL
SPECIMEN SOURCE: NORMAL
TRIGL SERPL-MCNC: NORMAL MG/DL
TROPONIN I SERPL-MCNC: 0.63 UG/L (ref 0–0.04)
TROPONIN I SERPL-MCNC: 0.72 UG/L (ref 0–0.04)
TROPONIN I SERPL-MCNC: 0.73 UG/L (ref 0–0.04)
UPPER EUS: NORMAL
UROBILINOGEN UR STRIP-MCNC: NORMAL MG/DL (ref 0–2)
VANCOMYCIN SERPL-MCNC: 11.6 MG/L
WBC # BLD AUTO: 14.2 10E9/L (ref 4–11)
WBC # BLD AUTO: 25.8 10E9/L (ref 4–11)
WBC # BLD AUTO: 4.1 10E9/L (ref 4–11)
WBC # BLD AUTO: 6.3 10E9/L (ref 4–11)
WBC # BLD AUTO: 6.9 10E9/L (ref 4–11)
WBC #/AREA URNS AUTO: 8 /HPF (ref 0–5)

## 2021-01-01 PROCEDURE — 80048 BASIC METABOLIC PNL TOTAL CA: CPT | Performed by: NURSE PRACTITIONER

## 2021-01-01 PROCEDURE — 250N000009 HC RX 250: Performed by: INTERNAL MEDICINE

## 2021-01-01 PROCEDURE — 84300 ASSAY OF URINE SODIUM: CPT | Performed by: STUDENT IN AN ORGANIZED HEALTH CARE EDUCATION/TRAINING PROGRAM

## 2021-01-01 PROCEDURE — 96366 THER/PROPH/DIAG IV INF ADDON: CPT | Performed by: INTERNAL MEDICINE

## 2021-01-01 PROCEDURE — 82550 ASSAY OF CK (CPK): CPT | Performed by: INTERNAL MEDICINE

## 2021-01-01 PROCEDURE — 85610 PROTHROMBIN TIME: CPT | Performed by: HOSPITALIST

## 2021-01-01 PROCEDURE — P9041 ALBUMIN (HUMAN),5%, 50ML: HCPCS | Performed by: STUDENT IN AN ORGANIZED HEALTH CARE EDUCATION/TRAINING PROGRAM

## 2021-01-01 PROCEDURE — 85610 PROTHROMBIN TIME: CPT | Performed by: STUDENT IN AN ORGANIZED HEALTH CARE EDUCATION/TRAINING PROGRAM

## 2021-01-01 PROCEDURE — 250N000013 HC RX MED GY IP 250 OP 250 PS 637: Performed by: NURSE PRACTITIONER

## 2021-01-01 PROCEDURE — 74176 CT ABD & PELVIS W/O CONTRAST: CPT | Mod: 26

## 2021-01-01 PROCEDURE — 250N000011 HC RX IP 250 OP 636: Performed by: STUDENT IN AN ORGANIZED HEALTH CARE EDUCATION/TRAINING PROGRAM

## 2021-01-01 PROCEDURE — 88342 IMHCHEM/IMCYTCHM 1ST ANTB: CPT | Mod: TC | Performed by: INTERNAL MEDICINE

## 2021-01-01 PROCEDURE — 3E043XZ INTRODUCTION OF VASOPRESSOR INTO CENTRAL VEIN, PERCUTANEOUS APPROACH: ICD-10-PCS | Performed by: INTERNAL MEDICINE

## 2021-01-01 PROCEDURE — 99285 EMERGENCY DEPT VISIT HI MDM: CPT | Mod: 25 | Performed by: INTERNAL MEDICINE

## 2021-01-01 PROCEDURE — 250N000013 HC RX MED GY IP 250 OP 250 PS 637: Performed by: STUDENT IN AN ORGANIZED HEALTH CARE EDUCATION/TRAINING PROGRAM

## 2021-01-01 PROCEDURE — 83605 ASSAY OF LACTIC ACID: CPT | Performed by: NURSE PRACTITIONER

## 2021-01-01 PROCEDURE — 83735 ASSAY OF MAGNESIUM: CPT | Performed by: STUDENT IN AN ORGANIZED HEALTH CARE EDUCATION/TRAINING PROGRAM

## 2021-01-01 PROCEDURE — 82803 BLOOD GASES ANY COMBINATION: CPT | Performed by: STUDENT IN AN ORGANIZED HEALTH CARE EDUCATION/TRAINING PROGRAM

## 2021-01-01 PROCEDURE — 83605 ASSAY OF LACTIC ACID: CPT | Performed by: INTERNAL MEDICINE

## 2021-01-01 PROCEDURE — 258N000003 HC RX IP 258 OP 636: Performed by: ANESTHESIOLOGY

## 2021-01-01 PROCEDURE — 90947 DIALYSIS REPEATED EVAL: CPT

## 2021-01-01 PROCEDURE — 84132 ASSAY OF SERUM POTASSIUM: CPT | Performed by: STUDENT IN AN ORGANIZED HEALTH CARE EDUCATION/TRAINING PROGRAM

## 2021-01-01 PROCEDURE — 258N000003 HC RX IP 258 OP 636: Performed by: STUDENT IN AN ORGANIZED HEALTH CARE EDUCATION/TRAINING PROGRAM

## 2021-01-01 PROCEDURE — 250N000011 HC RX IP 250 OP 636: Performed by: INTERNAL MEDICINE

## 2021-01-01 PROCEDURE — 258N000003 HC RX IP 258 OP 636: Performed by: NURSE PRACTITIONER

## 2021-01-01 PROCEDURE — 71045 X-RAY EXAM CHEST 1 VIEW: CPT

## 2021-01-01 PROCEDURE — 85025 COMPLETE CBC W/AUTO DIFF WBC: CPT | Performed by: SURGERY

## 2021-01-01 PROCEDURE — 999N000011 HC STATISTIC ANESTHESIA CASE

## 2021-01-01 PROCEDURE — 82803 BLOOD GASES ANY COMBINATION: CPT | Performed by: NURSE PRACTITIONER

## 2021-01-01 PROCEDURE — 99291 CRITICAL CARE FIRST HOUR: CPT | Mod: 25 | Performed by: INTERNAL MEDICINE

## 2021-01-01 PROCEDURE — 88172 CYTP DX EVAL FNA 1ST EA SITE: CPT | Mod: TC | Performed by: INTERNAL MEDICINE

## 2021-01-01 PROCEDURE — 87205 SMEAR GRAM STAIN: CPT | Performed by: NURSE PRACTITIONER

## 2021-01-01 PROCEDURE — 82962 GLUCOSE BLOOD TEST: CPT

## 2021-01-01 PROCEDURE — 94645 CONT INHLJ TX EACH ADDL HOUR: CPT

## 2021-01-01 PROCEDURE — 82550 ASSAY OF CK (CPK): CPT | Performed by: HOSPITALIST

## 2021-01-01 PROCEDURE — 88173 CYTOPATH EVAL FNA REPORT: CPT | Mod: 26 | Performed by: PATHOLOGY

## 2021-01-01 PROCEDURE — 258N000003 HC RX IP 258 OP 636: Performed by: INTERNAL MEDICINE

## 2021-01-01 PROCEDURE — C9113 INJ PANTOPRAZOLE SODIUM, VIA: HCPCS | Performed by: NURSE PRACTITIONER

## 2021-01-01 PROCEDURE — 80048 BASIC METABOLIC PNL TOTAL CA: CPT | Performed by: INTERNAL MEDICINE

## 2021-01-01 PROCEDURE — 83605 ASSAY OF LACTIC ACID: CPT | Performed by: STUDENT IN AN ORGANIZED HEALTH CARE EDUCATION/TRAINING PROGRAM

## 2021-01-01 PROCEDURE — 999N001017 HC STATISTIC GLUCOSE BY METER IP

## 2021-01-01 PROCEDURE — 96375 TX/PRO/DX INJ NEW DRUG ADDON: CPT | Performed by: INTERNAL MEDICINE

## 2021-01-01 PROCEDURE — 85027 COMPLETE CBC AUTOMATED: CPT | Performed by: HOSPITALIST

## 2021-01-01 PROCEDURE — U0005 INFEC AGEN DETEC AMPLI PROBE: HCPCS | Performed by: INTERNAL MEDICINE

## 2021-01-01 PROCEDURE — 999N000185 HC STATISTIC TRANSPORT TIME EA 15 MIN

## 2021-01-01 PROCEDURE — 94640 AIRWAY INHALATION TREATMENT: CPT

## 2021-01-01 PROCEDURE — 82150 ASSAY OF AMYLASE: CPT | Performed by: INTERNAL MEDICINE

## 2021-01-01 PROCEDURE — 83690 ASSAY OF LIPASE: CPT | Performed by: INTERNAL MEDICINE

## 2021-01-01 PROCEDURE — 999N000065 XR CHEST PORT 1 VW

## 2021-01-01 PROCEDURE — 70450 CT HEAD/BRAIN W/O DYE: CPT | Mod: 26 | Performed by: RADIOLOGY

## 2021-01-01 PROCEDURE — 999N000065 XR ABDOMEN 1 VW

## 2021-01-01 PROCEDURE — 82805 BLOOD GASES W/O2 SATURATION: CPT | Performed by: EMERGENCY MEDICINE

## 2021-01-01 PROCEDURE — 96365 THER/PROPH/DIAG IV INF INIT: CPT | Performed by: INTERNAL MEDICINE

## 2021-01-01 PROCEDURE — 88173 CYTOPATH EVAL FNA REPORT: CPT | Mod: TC | Performed by: INTERNAL MEDICINE

## 2021-01-01 PROCEDURE — 93005 ELECTROCARDIOGRAM TRACING: CPT | Performed by: INTERNAL MEDICINE

## 2021-01-01 PROCEDURE — 83735 ASSAY OF MAGNESIUM: CPT | Performed by: NURSE PRACTITIONER

## 2021-01-01 PROCEDURE — 85384 FIBRINOGEN ACTIVITY: CPT | Performed by: STUDENT IN AN ORGANIZED HEALTH CARE EDUCATION/TRAINING PROGRAM

## 2021-01-01 PROCEDURE — 36415 COLL VENOUS BLD VENIPUNCTURE: CPT | Performed by: SURGERY

## 2021-01-01 PROCEDURE — 999N000054 HC STATISTIC EKG NON-CHARGEABLE

## 2021-01-01 PROCEDURE — 84145 PROCALCITONIN (PCT): CPT | Performed by: EMERGENCY MEDICINE

## 2021-01-01 PROCEDURE — 87070 CULTURE OTHR SPECIMN AEROBIC: CPT | Performed by: NURSE PRACTITIONER

## 2021-01-01 PROCEDURE — 93010 ELECTROCARDIOGRAM REPORT: CPT | Performed by: INTERNAL MEDICINE

## 2021-01-01 PROCEDURE — 258N000001 HC RX 258: Performed by: STUDENT IN AN ORGANIZED HEALTH CARE EDUCATION/TRAINING PROGRAM

## 2021-01-01 PROCEDURE — 250N000011 HC RX IP 250 OP 636: Performed by: NURSE PRACTITIONER

## 2021-01-01 PROCEDURE — 82330 ASSAY OF CALCIUM: CPT | Performed by: HOSPITALIST

## 2021-01-01 PROCEDURE — 83605 ASSAY OF LACTIC ACID: CPT | Performed by: HOSPITALIST

## 2021-01-01 PROCEDURE — U0003 INFECTIOUS AGENT DETECTION BY NUCLEIC ACID (DNA OR RNA); SEVERE ACUTE RESPIRATORY SYNDROME CORONAVIRUS 2 (SARS-COV-2) (CORONAVIRUS DISEASE [COVID-19]), AMPLIFIED PROBE TECHNIQUE, MAKING USE OF HIGH THROUGHPUT TECHNOLOGIES AS DESCRIBED BY CMS-2020-01-R: HCPCS | Performed by: INTERNAL MEDICINE

## 2021-01-01 PROCEDURE — 250N000009 HC RX 250: Performed by: NURSE ANESTHETIST, CERTIFIED REGISTERED

## 2021-01-01 PROCEDURE — 36415 COLL VENOUS BLD VENIPUNCTURE: CPT | Performed by: INTERNAL MEDICINE

## 2021-01-01 PROCEDURE — 83735 ASSAY OF MAGNESIUM: CPT | Performed by: INTERNAL MEDICINE

## 2021-01-01 PROCEDURE — 85025 COMPLETE CBC W/AUTO DIFF WBC: CPT | Performed by: EMERGENCY MEDICINE

## 2021-01-01 PROCEDURE — 94003 VENT MGMT INPAT SUBQ DAY: CPT

## 2021-01-01 PROCEDURE — 999N000157 HC STATISTIC RCP TIME EA 10 MIN

## 2021-01-01 PROCEDURE — 94644 CONT INHLJ TX 1ST HOUR: CPT

## 2021-01-01 PROCEDURE — 93005 ELECTROCARDIOGRAM TRACING: CPT

## 2021-01-01 PROCEDURE — 83935 ASSAY OF URINE OSMOLALITY: CPT | Performed by: STUDENT IN AN ORGANIZED HEALTH CARE EDUCATION/TRAINING PROGRAM

## 2021-01-01 PROCEDURE — 82805 BLOOD GASES W/O2 SATURATION: CPT | Performed by: STUDENT IN AN ORGANIZED HEALTH CARE EDUCATION/TRAINING PROGRAM

## 2021-01-01 PROCEDURE — 88341 IMHCHEM/IMCYTCHM EA ADD ANTB: CPT | Mod: 26 | Performed by: PATHOLOGY

## 2021-01-01 PROCEDURE — 82330 ASSAY OF CALCIUM: CPT | Performed by: STUDENT IN AN ORGANIZED HEALTH CARE EDUCATION/TRAINING PROGRAM

## 2021-01-01 PROCEDURE — 200N000002 HC R&B ICU UMMC

## 2021-01-01 PROCEDURE — 84484 ASSAY OF TROPONIN QUANT: CPT | Performed by: NURSE PRACTITIONER

## 2021-01-01 PROCEDURE — 99291 CRITICAL CARE FIRST HOUR: CPT | Performed by: INTERNAL MEDICINE

## 2021-01-01 PROCEDURE — 74176 CT ABD & PELVIS W/O CONTRAST: CPT

## 2021-01-01 PROCEDURE — 99205 OFFICE O/P NEW HI 60 MIN: CPT | Performed by: SURGERY

## 2021-01-01 PROCEDURE — 83735 ASSAY OF MAGNESIUM: CPT | Performed by: HOSPITALIST

## 2021-01-01 PROCEDURE — 36415 COLL VENOUS BLD VENIPUNCTURE: CPT | Performed by: NURSE PRACTITIONER

## 2021-01-01 PROCEDURE — 250N000009 HC RX 250: Performed by: STUDENT IN AN ORGANIZED HEALTH CARE EDUCATION/TRAINING PROGRAM

## 2021-01-01 PROCEDURE — 250N000011 HC RX IP 250 OP 636: Performed by: NURSE ANESTHETIST, CERTIFIED REGISTERED

## 2021-01-01 PROCEDURE — 360N000083 HC SURGERY LEVEL 3 W/ FLUORO, PER MIN: Performed by: INTERNAL MEDICINE

## 2021-01-01 PROCEDURE — 93306 TTE W/DOPPLER COMPLETE: CPT | Mod: 26 | Performed by: INTERNAL MEDICINE

## 2021-01-01 PROCEDURE — 85384 FIBRINOGEN ACTIVITY: CPT | Performed by: NURSE PRACTITIONER

## 2021-01-01 PROCEDURE — 86140 C-REACTIVE PROTEIN: CPT | Performed by: NURSE PRACTITIONER

## 2021-01-01 PROCEDURE — 250N000009 HC RX 250: Performed by: HOSPITALIST

## 2021-01-01 PROCEDURE — 258N000003 HC RX IP 258 OP 636

## 2021-01-01 PROCEDURE — 71260 CT THORAX DX C+: CPT | Mod: 26 | Performed by: RADIOLOGY

## 2021-01-01 PROCEDURE — 94640 AIRWAY INHALATION TREATMENT: CPT | Mod: 76

## 2021-01-01 PROCEDURE — 250N000011 HC RX IP 250 OP 636

## 2021-01-01 PROCEDURE — 87040 BLOOD CULTURE FOR BACTERIA: CPT | Performed by: NURSE PRACTITIONER

## 2021-01-01 PROCEDURE — 84100 ASSAY OF PHOSPHORUS: CPT | Performed by: NURSE PRACTITIONER

## 2021-01-01 PROCEDURE — 80202 ASSAY OF VANCOMYCIN: CPT | Performed by: NURSE PRACTITIONER

## 2021-01-01 PROCEDURE — 83036 HEMOGLOBIN GLYCOSYLATED A1C: CPT | Performed by: NURSE PRACTITIONER

## 2021-01-01 PROCEDURE — 710N000010 HC RECOVERY PHASE 1, LEVEL 2, PER MIN: Performed by: INTERNAL MEDICINE

## 2021-01-01 PROCEDURE — 74183 MRI ABD W/O CNTR FLWD CNTR: CPT | Mod: GC | Performed by: RADIOLOGY

## 2021-01-01 PROCEDURE — 88172 CYTP DX EVAL FNA 1ST EA SITE: CPT | Mod: 26 | Performed by: PATHOLOGY

## 2021-01-01 PROCEDURE — 250N000009 HC RX 250: Performed by: NURSE PRACTITIONER

## 2021-01-01 PROCEDURE — 80053 COMPREHEN METABOLIC PANEL: CPT | Performed by: EMERGENCY MEDICINE

## 2021-01-01 PROCEDURE — 87636 SARSCOV2 & INF A&B AMP PRB: CPT | Performed by: INTERNAL MEDICINE

## 2021-01-01 PROCEDURE — 999N000015 HC STATISTIC ARTERIAL MONITORING DAILY

## 2021-01-01 PROCEDURE — 99233 SBSQ HOSP IP/OBS HIGH 50: CPT | Mod: GC | Performed by: INTERNAL MEDICINE

## 2021-01-01 PROCEDURE — 36415 COLL VENOUS BLD VENIPUNCTURE: CPT | Performed by: ANESTHESIOLOGY

## 2021-01-01 PROCEDURE — 258N000003 HC RX IP 258 OP 636: Performed by: NURSE ANESTHETIST, CERTIFIED REGISTERED

## 2021-01-01 PROCEDURE — 250N000013 HC RX MED GY IP 250 OP 250 PS 637: Performed by: INTERNAL MEDICINE

## 2021-01-01 PROCEDURE — 82550 ASSAY OF CK (CPK): CPT | Performed by: NURSE PRACTITIONER

## 2021-01-01 PROCEDURE — 370N000017 HC ANESTHESIA TECHNICAL FEE, PER MIN: Performed by: INTERNAL MEDICINE

## 2021-01-01 PROCEDURE — 88305 TISSUE EXAM BY PATHOLOGIST: CPT | Mod: 26 | Performed by: PATHOLOGY

## 2021-01-01 PROCEDURE — 82565 ASSAY OF CREATININE: CPT | Performed by: ANESTHESIOLOGY

## 2021-01-01 PROCEDURE — 999N001018 HC STATISTIC H-CELL BLOCK W/STAIN: Performed by: INTERNAL MEDICINE

## 2021-01-01 PROCEDURE — 71260 CT THORAX DX C+: CPT

## 2021-01-01 PROCEDURE — 74018 RADEX ABDOMEN 1 VIEW: CPT | Mod: 26 | Performed by: RADIOLOGY

## 2021-01-01 PROCEDURE — 99223 1ST HOSP IP/OBS HIGH 75: CPT | Mod: GC | Performed by: INTERNAL MEDICINE

## 2021-01-01 PROCEDURE — 86140 C-REACTIVE PROTEIN: CPT | Performed by: INTERNAL MEDICINE

## 2021-01-01 PROCEDURE — 80053 COMPREHEN METABOLIC PANEL: CPT | Performed by: INTERNAL MEDICINE

## 2021-01-01 PROCEDURE — A9581 GADOXETATE DISODIUM INJ: HCPCS | Performed by: RADIOLOGY

## 2021-01-01 PROCEDURE — 80048 BASIC METABOLIC PNL TOTAL CA: CPT | Performed by: STUDENT IN AN ORGANIZED HEALTH CARE EDUCATION/TRAINING PROGRAM

## 2021-01-01 PROCEDURE — 272N000001 HC OR GENERAL SUPPLY STERILE: Performed by: INTERNAL MEDICINE

## 2021-01-01 PROCEDURE — 88341 IMHCHEM/IMCYTCHM EA ADD ANTB: CPT | Mod: TC | Performed by: INTERNAL MEDICINE

## 2021-01-01 PROCEDURE — 88342 IMHCHEM/IMCYTCHM 1ST ANTB: CPT | Mod: 26 | Performed by: PATHOLOGY

## 2021-01-01 PROCEDURE — 86900 BLOOD TYPING SEROLOGIC ABO: CPT | Performed by: STUDENT IN AN ORGANIZED HEALTH CARE EDUCATION/TRAINING PROGRAM

## 2021-01-01 PROCEDURE — 710N000012 HC RECOVERY PHASE 2, PER MINUTE: Performed by: INTERNAL MEDICINE

## 2021-01-01 PROCEDURE — 84100 ASSAY OF PHOSPHORUS: CPT | Performed by: STUDENT IN AN ORGANIZED HEALTH CARE EDUCATION/TRAINING PROGRAM

## 2021-01-01 PROCEDURE — 84100 ASSAY OF PHOSPHORUS: CPT | Performed by: INTERNAL MEDICINE

## 2021-01-01 PROCEDURE — 84132 ASSAY OF SERUM POTASSIUM: CPT | Performed by: ANESTHESIOLOGY

## 2021-01-01 PROCEDURE — 999N000141 HC STATISTIC PRE-PROCEDURE NURSING ASSESSMENT: Performed by: INTERNAL MEDICINE

## 2021-01-01 PROCEDURE — 86850 RBC ANTIBODY SCREEN: CPT | Performed by: STUDENT IN AN ORGANIZED HEALTH CARE EDUCATION/TRAINING PROGRAM

## 2021-01-01 PROCEDURE — 0F7D8DZ DILATION OF PANCREATIC DUCT WITH INTRALUMINAL DEVICE, VIA NATURAL OR ARTIFICIAL OPENING ENDOSCOPIC: ICD-10-PCS | Performed by: INTERNAL MEDICINE

## 2021-01-01 PROCEDURE — 81001 URINALYSIS AUTO W/SCOPE: CPT | Performed by: STUDENT IN AN ORGANIZED HEALTH CARE EDUCATION/TRAINING PROGRAM

## 2021-01-01 PROCEDURE — 85730 THROMBOPLASTIN TIME PARTIAL: CPT | Performed by: HOSPITALIST

## 2021-01-01 PROCEDURE — 82330 ASSAY OF CALCIUM: CPT | Performed by: NURSE PRACTITIONER

## 2021-01-01 PROCEDURE — 0FB98ZX EXCISION OF COMMON BILE DUCT, VIA NATURAL OR ARTIFICIAL OPENING ENDOSCOPIC, DIAGNOSTIC: ICD-10-PCS | Performed by: INTERNAL MEDICINE

## 2021-01-01 PROCEDURE — C1877 STENT, NON-COAT/COV W/O DEL: HCPCS | Performed by: INTERNAL MEDICINE

## 2021-01-01 PROCEDURE — 71250 CT THORAX DX C-: CPT | Mod: GC | Performed by: RADIOLOGY

## 2021-01-01 PROCEDURE — 258N000003 HC RX IP 258 OP 636: Performed by: HOSPITALIST

## 2021-01-01 PROCEDURE — 250N000025 HC SEVOFLURANE, PER MIN: Performed by: INTERNAL MEDICINE

## 2021-01-01 PROCEDURE — 999N000208 ECHOCARDIOGRAM COMPLETE

## 2021-01-01 PROCEDURE — 87040 BLOOD CULTURE FOR BACTERIA: CPT | Performed by: INTERNAL MEDICINE

## 2021-01-01 PROCEDURE — 36620 INSERTION CATHETER ARTERY: CPT

## 2021-01-01 PROCEDURE — 71045 X-RAY EXAM CHEST 1 VIEW: CPT | Mod: 26 | Performed by: RADIOLOGY

## 2021-01-01 PROCEDURE — 5A1945Z RESPIRATORY VENTILATION, 24-96 CONSECUTIVE HOURS: ICD-10-PCS | Performed by: INTERNAL MEDICINE

## 2021-01-01 PROCEDURE — 99255 IP/OBS CONSLTJ NEW/EST HI 80: CPT | Mod: GC | Performed by: INTERNAL MEDICINE

## 2021-01-01 PROCEDURE — 02HV33Z INSERTION OF INFUSION DEVICE INTO SUPERIOR VENA CAVA, PERCUTANEOUS APPROACH: ICD-10-PCS | Performed by: INTERNAL MEDICINE

## 2021-01-01 PROCEDURE — 88305 TISSUE EXAM BY PATHOLOGIST: CPT | Mod: TC | Performed by: INTERNAL MEDICINE

## 2021-01-01 PROCEDURE — 80053 COMPREHEN METABOLIC PANEL: CPT | Performed by: SURGERY

## 2021-01-01 PROCEDURE — 85027 COMPLETE CBC AUTOMATED: CPT | Performed by: NURSE PRACTITIONER

## 2021-01-01 PROCEDURE — 83605 ASSAY OF LACTIC ACID: CPT | Performed by: EMERGENCY MEDICINE

## 2021-01-01 PROCEDURE — 70450 CT HEAD/BRAIN W/O DYE: CPT

## 2021-01-01 PROCEDURE — 36620 INSERTION CATHETER ARTERY: CPT | Mod: GC | Performed by: INTERNAL MEDICINE

## 2021-01-01 PROCEDURE — G0463 HOSPITAL OUTPT CLINIC VISIT: HCPCS

## 2021-01-01 PROCEDURE — 255N000002 HC RX 255 OP 636: Performed by: INTERNAL MEDICINE

## 2021-01-01 PROCEDURE — 85027 COMPLETE CBC AUTOMATED: CPT | Performed by: INTERNAL MEDICINE

## 2021-01-01 PROCEDURE — 258N000001 HC RX 258: Performed by: NURSE PRACTITIONER

## 2021-01-01 PROCEDURE — 93010 ELECTROCARDIOGRAM REPORT: CPT | Mod: 59 | Performed by: INTERNAL MEDICINE

## 2021-01-01 PROCEDURE — C1726 CATH, BAL DIL, NON-VASCULAR: HCPCS | Performed by: INTERNAL MEDICINE

## 2021-01-01 PROCEDURE — 5A1D90Z PERFORMANCE OF URINARY FILTRATION, CONTINUOUS, GREATER THAN 18 HOURS PER DAY: ICD-10-PCS | Performed by: INTERNAL MEDICINE

## 2021-01-01 PROCEDURE — 999N000179 XR SURGERY CARM FLUORO LESS THAN 5 MIN W STILLS: Mod: TC

## 2021-01-01 PROCEDURE — C1769 GUIDE WIRE: HCPCS | Performed by: INTERNAL MEDICINE

## 2021-01-01 PROCEDURE — 82550 ASSAY OF CK (CPK): CPT | Performed by: STUDENT IN AN ORGANIZED HEALTH CARE EDUCATION/TRAINING PROGRAM

## 2021-01-01 PROCEDURE — 94002 VENT MGMT INPAT INIT DAY: CPT

## 2021-01-01 PROCEDURE — 74177 CT ABD & PELVIS W/CONTRAST: CPT | Mod: 26 | Performed by: RADIOLOGY

## 2021-01-01 PROCEDURE — 80053 COMPREHEN METABOLIC PANEL: CPT | Performed by: HOSPITALIST

## 2021-01-01 PROCEDURE — P9041 ALBUMIN (HUMAN),5%, 50ML: HCPCS | Performed by: INTERNAL MEDICINE

## 2021-01-01 PROCEDURE — 82570 ASSAY OF URINE CREATININE: CPT | Performed by: STUDENT IN AN ORGANIZED HEALTH CARE EDUCATION/TRAINING PROGRAM

## 2021-01-01 PROCEDURE — 80053 COMPREHEN METABOLIC PANEL: CPT | Performed by: NURSE PRACTITIONER

## 2021-01-01 PROCEDURE — 82330 ASSAY OF CALCIUM: CPT | Performed by: INTERNAL MEDICINE

## 2021-01-01 PROCEDURE — 999N000127 HC STATISTIC PERIPHERAL IV START W US GUIDANCE

## 2021-01-01 PROCEDURE — C9803 HOPD COVID-19 SPEC COLLECT: HCPCS | Performed by: INTERNAL MEDICINE

## 2021-01-01 PROCEDURE — 80076 HEPATIC FUNCTION PANEL: CPT | Performed by: NURSE PRACTITIONER

## 2021-01-01 PROCEDURE — 99291 CRITICAL CARE FIRST HOUR: CPT | Mod: GC | Performed by: INTERNAL MEDICINE

## 2021-01-01 PROCEDURE — 250N000009 HC RX 250

## 2021-01-01 PROCEDURE — 250N000011 HC RX IP 250 OP 636: Performed by: EMERGENCY MEDICINE

## 2021-01-01 PROCEDURE — 83690 ASSAY OF LIPASE: CPT | Performed by: EMERGENCY MEDICINE

## 2021-01-01 PROCEDURE — 86901 BLOOD TYPING SEROLOGIC RH(D): CPT | Performed by: STUDENT IN AN ORGANIZED HEALTH CARE EDUCATION/TRAINING PROGRAM

## 2021-01-01 PROCEDURE — 96376 TX/PRO/DX INJ SAME DRUG ADON: CPT | Performed by: INTERNAL MEDICINE

## 2021-01-01 PROCEDURE — 999N000128 HC STATISTIC PERIPHERAL IV START W/O US GUIDANCE

## 2021-01-01 PROCEDURE — 99204 OFFICE O/P NEW MOD 45 MIN: CPT | Mod: GT | Performed by: CLINICAL NURSE SPECIALIST

## 2021-01-01 DEVICE — STENT ADVANIX PANCREA PIGTAIL 3FRX12CM M00536840: Type: IMPLANTABLE DEVICE | Site: PANCREATIC DUCT | Status: FUNCTIONAL

## 2021-01-01 DEVICE — IMPLANTABLE DEVICE: Type: IMPLANTABLE DEVICE | Site: BILE DUCT | Status: FUNCTIONAL

## 2021-01-01 RX ORDER — MIDAZOLAM (PF) 1 MG/ML IN 0.9 % SODIUM CHLORIDE INTRAVENOUS SOLUTION
1-6 CONTINUOUS
Status: DISCONTINUED | OUTPATIENT
Start: 2021-01-01 | End: 2021-01-01

## 2021-01-01 RX ORDER — HYDROMORPHONE HCL IN WATER/PF 6 MG/30 ML
0.2 PATIENT CONTROLLED ANALGESIA SYRINGE INTRAVENOUS ONCE
Status: COMPLETED | OUTPATIENT
Start: 2021-01-01 | End: 2021-01-01

## 2021-01-01 RX ORDER — SODIUM CHLORIDE 9 MG/ML
INJECTION, SOLUTION INTRAVENOUS CONTINUOUS
Status: DISCONTINUED | OUTPATIENT
Start: 2021-01-01 | End: 2021-01-01

## 2021-01-01 RX ORDER — DEXAMETHASONE SODIUM PHOSPHATE 4 MG/ML
INJECTION, SOLUTION INTRA-ARTICULAR; INTRALESIONAL; INTRAMUSCULAR; INTRAVENOUS; SOFT TISSUE PRN
Status: DISCONTINUED | OUTPATIENT
Start: 2021-01-01 | End: 2021-01-01

## 2021-01-01 RX ORDER — ONDANSETRON 4 MG/1
4 TABLET, ORALLY DISINTEGRATING ORAL EVERY 30 MIN PRN
Status: DISCONTINUED | OUTPATIENT
Start: 2021-01-01 | End: 2021-01-01 | Stop reason: HOSPADM

## 2021-01-01 RX ORDER — NALOXONE HYDROCHLORIDE 0.4 MG/ML
0.4 INJECTION, SOLUTION INTRAMUSCULAR; INTRAVENOUS; SUBCUTANEOUS
Status: DISCONTINUED | OUTPATIENT
Start: 2021-01-01 | End: 2021-01-01 | Stop reason: HOSPADM

## 2021-01-01 RX ORDER — ONDANSETRON 2 MG/ML
INJECTION INTRAMUSCULAR; INTRAVENOUS PRN
Status: DISCONTINUED | OUTPATIENT
Start: 2021-01-01 | End: 2021-01-01

## 2021-01-01 RX ORDER — MAGNESIUM SULFATE HEPTAHYDRATE 40 MG/ML
2 INJECTION, SOLUTION INTRAVENOUS EVERY 6 HOURS PRN
Status: DISCONTINUED | OUTPATIENT
Start: 2021-01-01 | End: 2021-01-01

## 2021-01-01 RX ORDER — DEXMEDETOMIDINE HYDROCHLORIDE 4 UG/ML
0.2-0.7 INJECTION, SOLUTION INTRAVENOUS CONTINUOUS
Status: DISCONTINUED | OUTPATIENT
Start: 2021-01-01 | End: 2021-01-01

## 2021-01-01 RX ORDER — MULTIVIT-MIN/IRON/FOLIC ACID/K 18-600-40
CAPSULE ORAL EVERY MORNING
COMMUNITY

## 2021-01-01 RX ORDER — NALOXONE HYDROCHLORIDE 0.4 MG/ML
0.4 INJECTION, SOLUTION INTRAMUSCULAR; INTRAVENOUS; SUBCUTANEOUS
Status: DISCONTINUED | OUTPATIENT
Start: 2021-01-01 | End: 2021-01-01

## 2021-01-01 RX ORDER — NALOXONE HYDROCHLORIDE 0.4 MG/ML
0.2 INJECTION, SOLUTION INTRAMUSCULAR; INTRAVENOUS; SUBCUTANEOUS
Status: DISCONTINUED | OUTPATIENT
Start: 2021-01-01 | End: 2021-01-01 | Stop reason: HOSPADM

## 2021-01-01 RX ORDER — SODIUM CHLORIDE, SODIUM LACTATE, POTASSIUM CHLORIDE, CALCIUM CHLORIDE 600; 310; 30; 20 MG/100ML; MG/100ML; MG/100ML; MG/100ML
INJECTION, SOLUTION INTRAVENOUS CONTINUOUS
Status: DISCONTINUED | OUTPATIENT
Start: 2021-01-01 | End: 2021-01-01

## 2021-01-01 RX ORDER — SODIUM CHLORIDE, SODIUM LACTATE, POTASSIUM CHLORIDE, CALCIUM CHLORIDE 600; 310; 30; 20 MG/100ML; MG/100ML; MG/100ML; MG/100ML
INJECTION, SOLUTION INTRAVENOUS CONTINUOUS
Status: DISCONTINUED | OUTPATIENT
Start: 2021-01-01 | End: 2021-01-01 | Stop reason: HOSPADM

## 2021-01-01 RX ORDER — NALOXONE HYDROCHLORIDE 0.4 MG/ML
0.2 INJECTION, SOLUTION INTRAMUSCULAR; INTRAVENOUS; SUBCUTANEOUS
Status: DISCONTINUED | OUTPATIENT
Start: 2021-01-01 | End: 2021-01-01

## 2021-01-01 RX ORDER — IPRATROPIUM BROMIDE AND ALBUTEROL SULFATE 2.5; .5 MG/3ML; MG/3ML
3 SOLUTION RESPIRATORY (INHALATION) EVERY 4 HOURS
Status: DISCONTINUED | OUTPATIENT
Start: 2021-01-01 | End: 2021-01-01

## 2021-01-01 RX ORDER — NOREPINEPHRINE BITARTRATE 0.06 MG/ML
0.03-0.4 INJECTION, SOLUTION INTRAVENOUS CONTINUOUS
Status: DISCONTINUED | OUTPATIENT
Start: 2021-01-01 | End: 2021-01-01

## 2021-01-01 RX ORDER — DEXTROSE MONOHYDRATE 100 MG/ML
INJECTION, SOLUTION INTRAVENOUS CONTINUOUS
Status: DISCONTINUED | OUTPATIENT
Start: 2021-01-01 | End: 2021-01-01

## 2021-01-01 RX ORDER — ACETAMINOPHEN 325 MG/1
325 TABLET ORAL EVERY 4 HOURS PRN
Status: DISCONTINUED | OUTPATIENT
Start: 2021-01-01 | End: 2021-01-01

## 2021-01-01 RX ORDER — CALCIUM CARBONATE 750 MG/1
TABLET, CHEWABLE ORAL DAILY PRN
COMMUNITY
Start: 2021-01-01

## 2021-01-01 RX ORDER — PROPOFOL 10 MG/ML
INJECTION, EMULSION INTRAVENOUS PRN
Status: DISCONTINUED | OUTPATIENT
Start: 2021-01-01 | End: 2021-01-01

## 2021-01-01 RX ORDER — INDOMETHACIN 50 MG/1
SUPPOSITORY RECTAL PRN
Status: DISCONTINUED | OUTPATIENT
Start: 2021-01-01 | End: 2021-01-01 | Stop reason: HOSPADM

## 2021-01-01 RX ORDER — IPRATROPIUM BROMIDE AND ALBUTEROL SULFATE 2.5; .5 MG/3ML; MG/3ML
3 SOLUTION RESPIRATORY (INHALATION) EVERY 4 HOURS PRN
Status: DISCONTINUED | OUTPATIENT
Start: 2021-01-01 | End: 2021-01-01

## 2021-01-01 RX ORDER — ETOMIDATE 2 MG/ML
INJECTION INTRAVENOUS PRN
Status: DISCONTINUED | OUTPATIENT
Start: 2021-01-01 | End: 2021-01-01

## 2021-01-01 RX ORDER — FENTANYL CITRATE 50 UG/ML
25-50 INJECTION, SOLUTION INTRAMUSCULAR; INTRAVENOUS
Status: DISCONTINUED | OUTPATIENT
Start: 2021-01-01 | End: 2021-01-01 | Stop reason: HOSPADM

## 2021-01-01 RX ORDER — POTASSIUM CHLORIDE 29.8 MG/ML
20 INJECTION INTRAVENOUS EVERY 6 HOURS PRN
Status: DISCONTINUED | OUTPATIENT
Start: 2021-01-01 | End: 2021-01-01

## 2021-01-01 RX ORDER — HEPARIN SODIUM 5000 [USP'U]/.5ML
5000 INJECTION, SOLUTION INTRAVENOUS; SUBCUTANEOUS EVERY 8 HOURS
Status: DISCONTINUED | OUTPATIENT
Start: 2021-01-01 | End: 2021-01-01

## 2021-01-01 RX ORDER — ASPIRIN 81 MG/1
81 TABLET, CHEWABLE ORAL EVERY MORNING
Status: DISCONTINUED | OUTPATIENT
Start: 2021-01-01 | End: 2021-01-01

## 2021-01-01 RX ORDER — ALBUMIN, HUMAN INJ 5% 5 %
250 SOLUTION INTRAVENOUS ONCE
Status: COMPLETED | OUTPATIENT
Start: 2021-01-01 | End: 2021-01-01

## 2021-01-01 RX ORDER — ONDANSETRON 2 MG/ML
4 INJECTION INTRAMUSCULAR; INTRAVENOUS EVERY 30 MIN PRN
Status: DISCONTINUED | OUTPATIENT
Start: 2021-01-01 | End: 2021-01-01 | Stop reason: HOSPADM

## 2021-01-01 RX ORDER — MEPERIDINE HYDROCHLORIDE 25 MG/ML
12.5 INJECTION INTRAMUSCULAR; INTRAVENOUS; SUBCUTANEOUS
Status: DISCONTINUED | OUTPATIENT
Start: 2021-01-01 | End: 2021-01-01 | Stop reason: HOSPADM

## 2021-01-01 RX ORDER — ONDANSETRON 4 MG/1
4 TABLET, ORALLY DISINTEGRATING ORAL EVERY 6 HOURS PRN
Status: DISCONTINUED | OUTPATIENT
Start: 2021-01-01 | End: 2021-01-01

## 2021-01-01 RX ORDER — MEROPENEM 1 G/1
1 INJECTION, POWDER, FOR SOLUTION INTRAVENOUS EVERY 8 HOURS
Status: DISCONTINUED | OUTPATIENT
Start: 2021-01-01 | End: 2021-01-01

## 2021-01-01 RX ORDER — SIMVASTATIN 40 MG
40 TABLET ORAL AT BEDTIME
Status: DISCONTINUED | OUTPATIENT
Start: 2021-01-01 | End: 2021-01-01

## 2021-01-01 RX ORDER — LIDOCAINE HYDROCHLORIDE 20 MG/ML
5 SOLUTION OROPHARYNGEAL ONCE
Status: DISCONTINUED | OUTPATIENT
Start: 2021-01-01 | End: 2021-01-01 | Stop reason: CLARIF

## 2021-01-01 RX ORDER — DEXTROSE MONOHYDRATE 25 G/50ML
25 INJECTION, SOLUTION INTRAVENOUS ONCE
Status: COMPLETED | OUTPATIENT
Start: 2021-01-01 | End: 2021-01-01

## 2021-01-01 RX ORDER — DEXTROSE MONOHYDRATE 25 G/50ML
25-50 INJECTION, SOLUTION INTRAVENOUS
Status: DISCONTINUED | OUTPATIENT
Start: 2021-01-01 | End: 2021-01-01

## 2021-01-01 RX ORDER — ASPIRIN 81 MG/1
81 TABLET, CHEWABLE ORAL EVERY MORNING
COMMUNITY
Start: 2020-01-01

## 2021-01-01 RX ORDER — MAGNESIUM SULFATE HEPTAHYDRATE 40 MG/ML
2 INJECTION, SOLUTION INTRAVENOUS ONCE
Status: COMPLETED | OUTPATIENT
Start: 2021-01-01 | End: 2021-01-01

## 2021-01-01 RX ORDER — LOSARTAN POTASSIUM 100 MG/1
100 TABLET ORAL EVERY MORNING
COMMUNITY

## 2021-01-01 RX ORDER — LIDOCAINE 40 MG/G
CREAM TOPICAL
Status: DISCONTINUED | OUTPATIENT
Start: 2021-01-01 | End: 2021-01-01

## 2021-01-01 RX ORDER — MEROPENEM 1 G/1
1 INJECTION, POWDER, FOR SOLUTION INTRAVENOUS EVERY 24 HOURS
Status: DISCONTINUED | OUTPATIENT
Start: 2021-02-25 | End: 2021-01-01

## 2021-01-01 RX ORDER — IOPAMIDOL 510 MG/ML
INJECTION, SOLUTION INTRAVASCULAR PRN
Status: DISCONTINUED | OUTPATIENT
Start: 2021-01-01 | End: 2021-01-01 | Stop reason: HOSPADM

## 2021-01-01 RX ORDER — PHENYLEPHRINE HCL IN 0.9% NACL 50MG/250ML
0.5-6 PLASTIC BAG, INJECTION (ML) INTRAVENOUS CONTINUOUS
Status: DISCONTINUED | OUTPATIENT
Start: 2021-01-01 | End: 2021-01-01

## 2021-01-01 RX ORDER — DEXTROSE 20 G/100ML
INJECTION, SOLUTION INTRAVENOUS CONTINUOUS
Status: DISCONTINUED | OUTPATIENT
Start: 2021-01-01 | End: 2021-01-01

## 2021-01-01 RX ORDER — LIDOCAINE 40 MG/G
CREAM TOPICAL
Status: DISCONTINUED | OUTPATIENT
Start: 2021-01-01 | End: 2021-01-01 | Stop reason: HOSPADM

## 2021-01-01 RX ORDER — POLYETHYLENE GLYCOL 3350 17 G/17G
17 POWDER, FOR SOLUTION ORAL DAILY
Status: DISCONTINUED | OUTPATIENT
Start: 2021-01-01 | End: 2021-01-01

## 2021-01-01 RX ORDER — EPHEDRINE SULFATE 50 MG/ML
INJECTION, SOLUTION INTRAMUSCULAR; INTRAVENOUS; SUBCUTANEOUS PRN
Status: DISCONTINUED | OUTPATIENT
Start: 2021-01-01 | End: 2021-01-01

## 2021-01-01 RX ORDER — LIDOCAINE HYDROCHLORIDE 20 MG/ML
INJECTION, SOLUTION INFILTRATION; PERINEURAL PRN
Status: DISCONTINUED | OUTPATIENT
Start: 2021-01-01 | End: 2021-01-01

## 2021-01-01 RX ORDER — CIPROFLOXACIN 2 MG/ML
INJECTION, SOLUTION INTRAVENOUS PRN
Status: DISCONTINUED | OUTPATIENT
Start: 2021-01-01 | End: 2021-01-01

## 2021-01-01 RX ORDER — VECURONIUM BROMIDE 1 MG/ML
10 INJECTION, POWDER, LYOPHILIZED, FOR SOLUTION INTRAVENOUS ONCE
Status: COMPLETED | OUTPATIENT
Start: 2021-01-01 | End: 2021-01-01

## 2021-01-01 RX ORDER — NICOTINE POLACRILEX 4 MG
15-30 LOZENGE BUCCAL
Status: DISCONTINUED | OUTPATIENT
Start: 2021-01-01 | End: 2021-01-01

## 2021-01-01 RX ORDER — HEPARIN SODIUM,PORCINE 10 UNIT/ML
5 VIAL (ML) INTRAVENOUS
Status: DISCONTINUED | OUTPATIENT
Start: 2021-01-01 | End: 2021-01-01

## 2021-01-01 RX ORDER — ONDANSETRON 2 MG/ML
4 INJECTION INTRAMUSCULAR; INTRAVENOUS EVERY 6 HOURS PRN
Status: DISCONTINUED | OUTPATIENT
Start: 2021-01-01 | End: 2021-01-01

## 2021-01-01 RX ORDER — PIPERACILLIN SODIUM, TAZOBACTAM SODIUM 3; .375 G/15ML; G/15ML
3.38 INJECTION, POWDER, LYOPHILIZED, FOR SOLUTION INTRAVENOUS ONCE
Status: COMPLETED | OUTPATIENT
Start: 2021-01-01 | End: 2021-01-01

## 2021-01-01 RX ORDER — DOBUTAMINE HYDROCHLORIDE 200 MG/100ML
2.5 INJECTION INTRAVENOUS CONTINUOUS
Status: DISCONTINUED | OUTPATIENT
Start: 2021-01-01 | End: 2021-01-01

## 2021-01-01 RX ORDER — HYDROMORPHONE HYDROCHLORIDE 1 MG/ML
0.3 INJECTION, SOLUTION INTRAMUSCULAR; INTRAVENOUS; SUBCUTANEOUS EVERY 4 HOURS PRN
Status: DISCONTINUED | OUTPATIENT
Start: 2021-01-01 | End: 2021-01-01

## 2021-01-01 RX ORDER — PIPERACILLIN SODIUM, TAZOBACTAM SODIUM 2; .25 G/10ML; G/10ML
2.25 INJECTION, POWDER, LYOPHILIZED, FOR SOLUTION INTRAVENOUS EVERY 6 HOURS
Status: DISCONTINUED | OUTPATIENT
Start: 2021-01-01 | End: 2021-01-01

## 2021-01-01 RX ORDER — FENTANYL CITRATE 50 UG/ML
INJECTION, SOLUTION INTRAMUSCULAR; INTRAVENOUS PRN
Status: DISCONTINUED | OUTPATIENT
Start: 2021-01-01 | End: 2021-01-01

## 2021-01-01 RX ORDER — MEROPENEM 1 G/1
1 INJECTION, POWDER, FOR SOLUTION INTRAVENOUS EVERY 12 HOURS
Status: DISCONTINUED | OUTPATIENT
Start: 2021-01-01 | End: 2021-01-01

## 2021-01-01 RX ORDER — IOPAMIDOL 755 MG/ML
73 INJECTION, SOLUTION INTRAVASCULAR ONCE
Status: COMPLETED | OUTPATIENT
Start: 2021-01-01 | End: 2021-01-01

## 2021-01-01 RX ORDER — GLYCOPYRROLATE 0.2 MG/ML
.1-.2 INJECTION, SOLUTION INTRAMUSCULAR; INTRAVENOUS EVERY 4 HOURS PRN
Status: DISCONTINUED | OUTPATIENT
Start: 2021-01-01 | End: 2021-01-01

## 2021-01-01 RX ORDER — FLUMAZENIL 0.1 MG/ML
0.2 INJECTION, SOLUTION INTRAVENOUS
Status: DISCONTINUED | OUTPATIENT
Start: 2021-01-01 | End: 2021-01-01 | Stop reason: HOSPADM

## 2021-01-01 RX ORDER — CIPROFLOXACIN 500 MG/1
500 TABLET, FILM COATED ORAL 2 TIMES DAILY
Qty: 10 TABLET | Refills: 0 | Status: SHIPPED | OUTPATIENT
Start: 2021-01-01 | End: 2021-01-01

## 2021-01-01 RX ORDER — FLUCONAZOLE 2 MG/ML
200 INJECTION, SOLUTION INTRAVENOUS EVERY 24 HOURS
Status: DISCONTINUED | OUTPATIENT
Start: 2021-01-01 | End: 2021-01-01

## 2021-01-01 RX ORDER — OXYCODONE AND ACETAMINOPHEN 5; 325 MG/1; MG/1
1 TABLET ORAL EVERY 4 HOURS PRN
Qty: 12 TABLET | Refills: 0 | Status: SHIPPED | OUTPATIENT
Start: 2021-01-01

## 2021-01-01 RX ADMIN — Medication 50 MCG: at 07:55

## 2021-01-01 RX ADMIN — CALCIUM CHLORIDE, MAGNESIUM CHLORIDE, DEXTROSE MONOHYDRATE, LACTIC ACID, SODIUM CHLORIDE, SODIUM BICARBONATE AND POTASSIUM CHLORIDE 12.5 ML/KG/HR: 5.15; 2.03; 22; 5.4; 6.46; 3.09; .157 INJECTION INTRAVENOUS at 12:41

## 2021-01-01 RX ADMIN — SODIUM CHLORIDE: 9 INJECTION, SOLUTION INTRAVENOUS at 23:33

## 2021-01-01 RX ADMIN — Medication 20 NG/KG/MIN: at 22:33

## 2021-01-01 RX ADMIN — POLYETHYLENE GLYCOL 3350 17 G: 17 POWDER, FOR SOLUTION ORAL at 07:51

## 2021-01-01 RX ADMIN — MAGNESIUM SULFATE IN WATER 2 G: 40 INJECTION, SOLUTION INTRAVENOUS at 11:35

## 2021-01-01 RX ADMIN — MEROPENEM 1 G: 1 INJECTION, POWDER, FOR SOLUTION INTRAVENOUS at 12:34

## 2021-01-01 RX ADMIN — DEXTROSE 500 ML: 20 INJECTION, SOLUTION INTRAVENOUS at 14:48

## 2021-01-01 RX ADMIN — ANGIOTENSIN II 20 NG/KG/MIN: 2.5 INJECTION INTRAVENOUS at 20:33

## 2021-01-01 RX ADMIN — PHENYLEPHRINE HYDROCHLORIDE 200 MCG: 10 INJECTION INTRAVENOUS at 08:52

## 2021-01-01 RX ADMIN — SUGAMMADEX 200 MG: 100 INJECTION, SOLUTION INTRAVENOUS at 10:46

## 2021-01-01 RX ADMIN — CALCIUM CHLORIDE 1 G: 100 INJECTION, SOLUTION INTRAVENOUS at 14:54

## 2021-01-01 RX ADMIN — Medication 50 MCG/HR: at 08:37

## 2021-01-01 RX ADMIN — CALCIUM CHLORIDE, MAGNESIUM CHLORIDE, SODIUM CHLORIDE, SODIUM BICARBONATE, POTASSIUM CHLORIDE AND SODIUM PHOSPHATE DIBASIC DIHYDRATE 12.5 ML/KG/HR: 3.68; 3.05; 6.34; 3.09; .314; .187 INJECTION INTRAVENOUS at 22:36

## 2021-01-01 RX ADMIN — HYDROCORTISONE SODIUM SUCCINATE 50 MG: 100 INJECTION, POWDER, FOR SOLUTION INTRAMUSCULAR; INTRAVENOUS at 13:30

## 2021-01-01 RX ADMIN — CALCIUM CHLORIDE 1 G: 100 INJECTION, SOLUTION INTRAVENOUS at 00:18

## 2021-01-01 RX ADMIN — ONDANSETRON 4 MG: 2 INJECTION INTRAMUSCULAR; INTRAVENOUS at 08:30

## 2021-01-01 RX ADMIN — PHENYLEPHRINE HYDROCHLORIDE 300 MCG: 10 INJECTION INTRAVENOUS at 08:50

## 2021-01-01 RX ADMIN — CALCIUM CHLORIDE, MAGNESIUM CHLORIDE, SODIUM CHLORIDE, SODIUM BICARBONATE, POTASSIUM CHLORIDE AND SODIUM PHOSPHATE DIBASIC DIHYDRATE: 3.68; 3.05; 6.34; 3.09; .314; .187 INJECTION INTRAVENOUS at 05:44

## 2021-01-01 RX ADMIN — Medication 0.5 MCG/KG/MIN: at 10:51

## 2021-01-01 RX ADMIN — MIDAZOLAM 2 MG: 1 INJECTION INTRAMUSCULAR; INTRAVENOUS at 11:01

## 2021-01-01 RX ADMIN — FLUCONAZOLE 200 MG: 200 INJECTION, SOLUTION INTRAVENOUS at 12:04

## 2021-01-01 RX ADMIN — CALCIUM CHLORIDE 1 G: 100 INJECTION, SOLUTION INTRAVENOUS at 16:45

## 2021-01-01 RX ADMIN — SODIUM CHLORIDE 1000 ML: 9 INJECTION, SOLUTION INTRAVENOUS at 23:35

## 2021-01-01 RX ADMIN — HYDROCORTISONE SODIUM SUCCINATE 50 MG: 100 INJECTION, POWDER, FOR SOLUTION INTRAMUSCULAR; INTRAVENOUS at 20:05

## 2021-01-01 RX ADMIN — SODIUM CHLORIDE, PRESERVATIVE FREE 100 ML: 5 INJECTION INTRAVENOUS at 23:17

## 2021-01-01 RX ADMIN — HEPARIN SODIUM 5000 UNITS: 5000 INJECTION, SOLUTION INTRAVENOUS; SUBCUTANEOUS at 02:06

## 2021-01-01 RX ADMIN — SODIUM BICARBONATE 50 MEQ: 84 INJECTION, SOLUTION INTRAVENOUS at 14:06

## 2021-01-01 RX ADMIN — DEXMEDETOMIDINE 0.2 MCG/KG/HR: 100 INJECTION, SOLUTION, CONCENTRATE INTRAVENOUS at 08:36

## 2021-01-01 RX ADMIN — HYDROMORPHONE HYDROCHLORIDE 0.3 MG: 1 INJECTION, SOLUTION INTRAMUSCULAR; INTRAVENOUS; SUBCUTANEOUS at 04:27

## 2021-01-01 RX ADMIN — PHENYLEPHRINE HYDROCHLORIDE 200 MCG: 10 INJECTION INTRAVENOUS at 08:28

## 2021-01-01 RX ADMIN — HYDROCORTISONE SODIUM SUCCINATE 50 MG: 100 INJECTION, POWDER, FOR SOLUTION INTRAMUSCULAR; INTRAVENOUS at 02:23

## 2021-01-01 RX ADMIN — PHENYLEPHRINE HYDROCHLORIDE 100 MCG: 10 INJECTION INTRAVENOUS at 08:57

## 2021-01-01 RX ADMIN — Medication 2.4 UNITS/HR: at 09:11

## 2021-01-01 RX ADMIN — SENNOSIDES 5 ML: 8.8 LIQUID ORAL at 07:25

## 2021-01-01 RX ADMIN — Medication 150 MCG/HR: at 04:00

## 2021-01-01 RX ADMIN — DOBUTAMINE HYDROCHLORIDE 2.5 MCG/KG/MIN: 200 INJECTION INTRAVENOUS at 22:45

## 2021-01-01 RX ADMIN — CALCIUM CHLORIDE, MAGNESIUM CHLORIDE, SODIUM CHLORIDE, SODIUM BICARBONATE, POTASSIUM CHLORIDE AND SODIUM PHOSPHATE DIBASIC DIHYDRATE: 3.68; 3.05; 6.34; 3.09; .314; .187 INJECTION INTRAVENOUS at 16:46

## 2021-01-01 RX ADMIN — CALCIUM CHLORIDE 2 G: 100 INJECTION, SOLUTION INTRAVENOUS at 09:16

## 2021-01-01 RX ADMIN — CALCIUM CHLORIDE, MAGNESIUM CHLORIDE, DEXTROSE MONOHYDRATE, LACTIC ACID, SODIUM CHLORIDE, SODIUM BICARBONATE AND POTASSIUM CHLORIDE 12.5 ML/KG/HR: 5.15; 2.03; 22; 5.4; 6.46; 3.09; .157 INJECTION INTRAVENOUS at 07:50

## 2021-01-01 RX ADMIN — DEXTROSE MONOHYDRATE 50 ML: 500 INJECTION PARENTERAL at 20:15

## 2021-01-01 RX ADMIN — IPRATROPIUM BROMIDE AND ALBUTEROL SULFATE 3 ML: .5; 3 SOLUTION RESPIRATORY (INHALATION) at 15:25

## 2021-01-01 RX ADMIN — MIDAZOLAM 2 MG: 1 INJECTION INTRAMUSCULAR; INTRAVENOUS at 12:38

## 2021-01-01 RX ADMIN — HEPARIN SODIUM 5000 UNITS: 5000 INJECTION, SOLUTION INTRAVENOUS; SUBCUTANEOUS at 18:15

## 2021-01-01 RX ADMIN — POLYETHYLENE GLYCOL 3350 17 G: 17 POWDER, FOR SOLUTION ORAL at 07:22

## 2021-01-01 RX ADMIN — CALCIUM CHLORIDE 1 G: 100 INJECTION, SOLUTION INTRAVENOUS at 23:42

## 2021-01-01 RX ADMIN — PHENYLEPHRINE HYDROCHLORIDE 100 MCG: 10 INJECTION INTRAVENOUS at 09:06

## 2021-01-01 RX ADMIN — Medication 0.18 MCG/KG/MIN: at 11:30

## 2021-01-01 RX ADMIN — MICAFUNGIN SODIUM 100 MG: 50 INJECTION, POWDER, LYOPHILIZED, FOR SOLUTION INTRAVENOUS at 02:06

## 2021-01-01 RX ADMIN — CALCIUM CHLORIDE, MAGNESIUM CHLORIDE, SODIUM CHLORIDE, SODIUM BICARBONATE, POTASSIUM CHLORIDE AND SODIUM PHOSPHATE DIBASIC DIHYDRATE 12.5 ML/KG/HR: 3.68; 3.05; 6.34; 3.09; .314; .187 INJECTION INTRAVENOUS at 03:51

## 2021-01-01 RX ADMIN — THIAMINE HYDROCHLORIDE 200 MG: 100 INJECTION, SOLUTION INTRAMUSCULAR; INTRAVENOUS at 14:13

## 2021-01-01 RX ADMIN — NOREPINEPHRINE BITARTRATE 0.1 MCG/KG/MIN: 1 INJECTION, SOLUTION, CONCENTRATE INTRAVENOUS at 08:02

## 2021-01-01 RX ADMIN — ROCURONIUM BROMIDE 50 MG: 10 INJECTION INTRAVENOUS at 08:19

## 2021-01-01 RX ADMIN — AMIODARONE HYDROCHLORIDE 150 MG: 1.5 INJECTION, SOLUTION INTRAVENOUS at 04:40

## 2021-01-01 RX ADMIN — DEXMEDETOMIDINE 0.7 MCG/KG/HR: 100 INJECTION, SOLUTION, CONCENTRATE INTRAVENOUS at 17:34

## 2021-01-01 RX ADMIN — PANTOPRAZOLE SODIUM 40 MG: 40 INJECTION, POWDER, FOR SOLUTION INTRAVENOUS at 07:51

## 2021-01-01 RX ADMIN — DOBUTAMINE HYDROCHLORIDE 2.5 MCG/KG/MIN: 200 INJECTION INTRAVENOUS at 15:04

## 2021-01-01 RX ADMIN — Medication 2.4 UNITS/HR: at 17:43

## 2021-01-01 RX ADMIN — CALCIUM CHLORIDE, MAGNESIUM CHLORIDE, SODIUM CHLORIDE, SODIUM BICARBONATE, POTASSIUM CHLORIDE AND SODIUM PHOSPHATE DIBASIC DIHYDRATE 12.5 ML/KG/HR: 3.68; 3.05; 6.34; 3.09; .314; .187 INJECTION INTRAVENOUS at 15:46

## 2021-01-01 RX ADMIN — PROPOFOL 100 MG: 10 INJECTION, EMULSION INTRAVENOUS at 08:19

## 2021-01-01 RX ADMIN — Medication 5 MG: at 08:59

## 2021-01-01 RX ADMIN — Medication 4 MG/HR: at 16:48

## 2021-01-01 RX ADMIN — PANTOPRAZOLE SODIUM 40 MG: 40 INJECTION, POWDER, FOR SOLUTION INTRAVENOUS at 11:04

## 2021-01-01 RX ADMIN — PHENYLEPHRINE HYDROCHLORIDE 200 MCG: 10 INJECTION INTRAVENOUS at 09:27

## 2021-01-01 RX ADMIN — MEROPENEM 1 G: 1 INJECTION, POWDER, FOR SOLUTION INTRAVENOUS at 10:56

## 2021-01-01 RX ADMIN — HEPARIN SODIUM 5000 UNITS: 5000 INJECTION, SOLUTION INTRAVENOUS; SUBCUTANEOUS at 02:24

## 2021-01-01 RX ADMIN — HYDROCORTISONE SODIUM SUCCINATE 50 MG: 100 INJECTION, POWDER, FOR SOLUTION INTRAMUSCULAR; INTRAVENOUS at 14:22

## 2021-01-01 RX ADMIN — Medication 2.4 UNITS/HR: at 16:07

## 2021-01-01 RX ADMIN — MEROPENEM 1 G: 1 INJECTION, POWDER, FOR SOLUTION INTRAVENOUS at 20:22

## 2021-01-01 RX ADMIN — DOCUSATE SODIUM 100 MG: 50 LIQUID ORAL at 07:51

## 2021-01-01 RX ADMIN — Medication 0.24 MCG/KG/MIN: at 04:58

## 2021-01-01 RX ADMIN — DEXTROSE 500 ML: 20 INJECTION, SOLUTION INTRAVENOUS at 23:36

## 2021-01-01 RX ADMIN — SODIUM CHLORIDE, POTASSIUM CHLORIDE, SODIUM LACTATE AND CALCIUM CHLORIDE: 600; 310; 30; 20 INJECTION, SOLUTION INTRAVENOUS at 10:09

## 2021-01-01 RX ADMIN — HYDROCORTISONE SODIUM SUCCINATE 50 MG: 100 INJECTION, POWDER, FOR SOLUTION INTRAMUSCULAR; INTRAVENOUS at 07:45

## 2021-01-01 RX ADMIN — DOCUSATE SODIUM 100 MG: 50 LIQUID ORAL at 07:22

## 2021-01-01 RX ADMIN — DEXTROSE MONOHYDRATE 50 ML: 500 INJECTION PARENTERAL at 08:20

## 2021-01-01 RX ADMIN — DEXAMETHASONE SODIUM PHOSPHATE 8 MG: 4 INJECTION, SOLUTION INTRA-ARTICULAR; INTRALESIONAL; INTRAMUSCULAR; INTRAVENOUS; SOFT TISSUE at 08:30

## 2021-01-01 RX ADMIN — Medication 20 NG/KG/MIN: at 08:04

## 2021-01-01 RX ADMIN — PHENYLEPHRINE HYDROCHLORIDE 100 MCG: 10 INJECTION INTRAVENOUS at 08:33

## 2021-01-01 RX ADMIN — CALCIUM GLUCONATE 2 G: 98 INJECTION, SOLUTION INTRAVENOUS at 04:01

## 2021-01-01 RX ADMIN — PHENYLEPHRINE HYDROCHLORIDE 100 MCG: 10 INJECTION INTRAVENOUS at 09:32

## 2021-01-01 RX ADMIN — CALCIUM CHLORIDE, MAGNESIUM CHLORIDE, SODIUM CHLORIDE, SODIUM BICARBONATE, POTASSIUM CHLORIDE AND SODIUM PHOSPHATE DIBASIC DIHYDRATE 12.5 ML/KG/HR: 3.68; 3.05; 6.34; 3.09; .314; .187 INJECTION INTRAVENOUS at 20:48

## 2021-01-01 RX ADMIN — PHENYLEPHRINE HYDROCHLORIDE 300 MCG: 10 INJECTION INTRAVENOUS at 08:52

## 2021-01-01 RX ADMIN — SODIUM CHLORIDE, POTASSIUM CHLORIDE, SODIUM LACTATE AND CALCIUM CHLORIDE: 600; 310; 30; 20 INJECTION, SOLUTION INTRAVENOUS at 08:10

## 2021-01-01 RX ADMIN — FENTANYL CITRATE 50 MCG: 50 INJECTION, SOLUTION INTRAMUSCULAR; INTRAVENOUS at 10:41

## 2021-01-01 RX ADMIN — SODIUM CHLORIDE: 9 INJECTION, SOLUTION INTRAVENOUS at 21:18

## 2021-01-01 RX ADMIN — HEPARIN SODIUM 5000 UNITS: 5000 INJECTION, SOLUTION INTRAVENOUS; SUBCUTANEOUS at 10:06

## 2021-01-01 RX ADMIN — IOPAMIDOL 73 ML: 755 INJECTION, SOLUTION INTRAVENOUS at 23:17

## 2021-01-01 RX ADMIN — Medication 10 MG: at 08:50

## 2021-01-01 RX ADMIN — Medication 0.4 MCG/KG/MIN: at 18:44

## 2021-01-01 RX ADMIN — PHENYLEPHRINE HYDROCHLORIDE 100 MCG: 10 INJECTION INTRAVENOUS at 09:16

## 2021-01-01 RX ADMIN — HUMAN ALBUMIN MICROSPHERES AND PERFLUTREN 5 ML: 10; .22 INJECTION, SOLUTION INTRAVENOUS at 12:25

## 2021-01-01 RX ADMIN — SODIUM CHLORIDE, POTASSIUM CHLORIDE, SODIUM LACTATE AND CALCIUM CHLORIDE 500 ML: 600; 310; 30; 20 INJECTION, SOLUTION INTRAVENOUS at 06:30

## 2021-01-01 RX ADMIN — SODIUM CHLORIDE, POTASSIUM CHLORIDE, SODIUM LACTATE AND CALCIUM CHLORIDE: 600; 310; 30; 20 INJECTION, SOLUTION INTRAVENOUS at 01:59

## 2021-01-01 RX ADMIN — Medication 4 UNITS/HR: at 06:23

## 2021-01-01 RX ADMIN — HEPARIN SODIUM 5000 UNITS: 5000 INJECTION, SOLUTION INTRAVENOUS; SUBCUTANEOUS at 11:05

## 2021-01-01 RX ADMIN — CALCIUM CHLORIDE 1 G: 100 INJECTION, SOLUTION INTRAVENOUS at 11:17

## 2021-01-01 RX ADMIN — DEXTROSE MONOHYDRATE 50 ML: 500 INJECTION PARENTERAL at 11:58

## 2021-01-01 RX ADMIN — CALCIUM CHLORIDE, MAGNESIUM CHLORIDE, SODIUM CHLORIDE, SODIUM BICARBONATE, POTASSIUM CHLORIDE AND SODIUM PHOSPHATE DIBASIC DIHYDRATE 12.5 ML/KG/HR: 3.68; 3.05; 6.34; 3.09; .314; .187 INJECTION INTRAVENOUS at 02:47

## 2021-01-01 RX ADMIN — Medication 5 ML: at 16:15

## 2021-01-01 RX ADMIN — HYDROCORTISONE SODIUM SUCCINATE 50 MG: 100 INJECTION, POWDER, FOR SOLUTION INTRAMUSCULAR; INTRAVENOUS at 07:51

## 2021-01-01 RX ADMIN — VECURONIUM BROMIDE 10 MG: 1 INJECTION, POWDER, LYOPHILIZED, FOR SOLUTION INTRAVENOUS at 08:52

## 2021-01-01 RX ADMIN — SODIUM BICARBONATE: 84 INJECTION, SOLUTION INTRAVENOUS at 02:28

## 2021-01-01 RX ADMIN — SODIUM BICARBONATE 50 MEQ: 84 INJECTION, SOLUTION INTRAVENOUS at 09:36

## 2021-01-01 RX ADMIN — PHENYLEPHRINE HYDROCHLORIDE 100 MCG: 10 INJECTION INTRAVENOUS at 09:49

## 2021-01-01 RX ADMIN — MEROPENEM 1 G: 1 INJECTION, POWDER, FOR SOLUTION INTRAVENOUS at 20:05

## 2021-01-01 RX ADMIN — MIDAZOLAM 2 MG: 1 INJECTION INTRAMUSCULAR; INTRAVENOUS at 16:14

## 2021-01-01 RX ADMIN — THIAMINE HYDROCHLORIDE 200 MG: 100 INJECTION, SOLUTION INTRAMUSCULAR; INTRAVENOUS at 07:22

## 2021-01-01 RX ADMIN — HYDROCORTISONE SODIUM SUCCINATE 50 MG: 100 INJECTION, POWDER, FOR SOLUTION INTRAMUSCULAR; INTRAVENOUS at 20:22

## 2021-01-01 RX ADMIN — HYDROCORTISONE SODIUM SUCCINATE 50 MG: 100 INJECTION, POWDER, FOR SOLUTION INTRAMUSCULAR; INTRAVENOUS at 02:06

## 2021-01-01 RX ADMIN — LIDOCAINE HYDROCHLORIDE 100 MG: 20 INJECTION, SOLUTION INFILTRATION; PERINEURAL at 08:18

## 2021-01-01 RX ADMIN — CALCIUM CHLORIDE 1 G: 100 INJECTION, SOLUTION INTRAVENOUS at 06:07

## 2021-01-01 RX ADMIN — MEROPENEM 1 G: 1 INJECTION, POWDER, FOR SOLUTION INTRAVENOUS at 04:41

## 2021-01-01 RX ADMIN — PHENYLEPHRINE HYDROCHLORIDE 200 MCG: 10 INJECTION INTRAVENOUS at 08:21

## 2021-01-01 RX ADMIN — CIPROFLOXACIN 400 MG: 2 INJECTION INTRAVENOUS at 08:25

## 2021-01-01 RX ADMIN — HUMAN INSULIN 10 UNITS: 100 INJECTION, SOLUTION SUBCUTANEOUS at 15:04

## 2021-01-01 RX ADMIN — PHENYLEPHRINE HYDROCHLORIDE 200 MCG: 10 INJECTION INTRAVENOUS at 08:38

## 2021-01-01 RX ADMIN — PHENYLEPHRINE HYDROCHLORIDE 1.5 MCG/KG/MIN: 10 INJECTION INTRAVENOUS at 17:59

## 2021-01-01 RX ADMIN — Medication 150 MCG/HR: at 12:47

## 2021-01-01 RX ADMIN — CALCIUM CHLORIDE, MAGNESIUM CHLORIDE, DEXTROSE MONOHYDRATE, LACTIC ACID, SODIUM CHLORIDE, SODIUM BICARBONATE AND POTASSIUM CHLORIDE 12.5 ML/KG/HR: 5.15; 2.03; 22; 5.4; 6.46; 3.09; .157 INJECTION INTRAVENOUS at 12:40

## 2021-01-01 RX ADMIN — Medication 50 MCG: at 14:14

## 2021-01-01 RX ADMIN — Medication 2.4 UNITS/HR: at 09:31

## 2021-01-01 RX ADMIN — VANCOMYCIN HYDROCHLORIDE 1500 MG: 10 INJECTION, POWDER, LYOPHILIZED, FOR SOLUTION INTRAVENOUS at 00:16

## 2021-01-01 RX ADMIN — Medication 1 MG/HR: at 17:49

## 2021-01-01 RX ADMIN — Medication 0.18 MCG/KG/MIN: at 18:14

## 2021-01-01 RX ADMIN — PIPERACILLIN SODIUM AND TAZOBACTAM SODIUM 2.25 G: 2; .25 INJECTION, POWDER, LYOPHILIZED, FOR SOLUTION INTRAVENOUS at 03:43

## 2021-01-01 RX ADMIN — SODIUM CHLORIDE, POTASSIUM CHLORIDE, SODIUM LACTATE AND CALCIUM CHLORIDE 1000 ML: 600; 310; 30; 20 INJECTION, SOLUTION INTRAVENOUS at 02:26

## 2021-01-01 RX ADMIN — SODIUM CHLORIDE, POTASSIUM CHLORIDE, SODIUM LACTATE AND CALCIUM CHLORIDE: 600; 310; 30; 20 INJECTION, SOLUTION INTRAVENOUS at 10:58

## 2021-01-01 RX ADMIN — PHENYLEPHRINE HYDROCHLORIDE 300 MCG: 10 INJECTION INTRAVENOUS at 08:53

## 2021-01-01 RX ADMIN — NOREPINEPHRINE BITARTRATE 0.03 MCG/KG/MIN: 1 INJECTION, SOLUTION, CONCENTRATE INTRAVENOUS at 06:47

## 2021-01-01 RX ADMIN — SODIUM BICARBONATE: 84 INJECTION, SOLUTION INTRAVENOUS at 14:12

## 2021-01-01 RX ADMIN — Medication 10 MG: at 08:48

## 2021-01-01 RX ADMIN — MIDAZOLAM 2 MG: 1 INJECTION INTRAMUSCULAR; INTRAVENOUS at 20:52

## 2021-01-01 RX ADMIN — DEXTROSE 500 ML: 20 INJECTION, SOLUTION INTRAVENOUS at 05:55

## 2021-01-01 RX ADMIN — CALCIUM GLUCONATE 2 G: 98 INJECTION, SOLUTION INTRAVENOUS at 09:42

## 2021-01-01 RX ADMIN — CALCIUM CHLORIDE, MAGNESIUM CHLORIDE, SODIUM CHLORIDE, SODIUM BICARBONATE, POTASSIUM CHLORIDE AND SODIUM PHOSPHATE DIBASIC DIHYDRATE 12.5 ML/KG/HR: 3.68; 3.05; 6.34; 3.09; .314; .187 INJECTION INTRAVENOUS at 05:43

## 2021-01-01 RX ADMIN — DEXTROSE MONOHYDRATE 25 G: 500 INJECTION PARENTERAL at 11:13

## 2021-01-01 RX ADMIN — Medication 50 MCG: at 18:08

## 2021-01-01 RX ADMIN — Medication 150 MCG/HR: at 20:26

## 2021-01-01 RX ADMIN — Medication 10 MG: at 08:37

## 2021-01-01 RX ADMIN — THIAMINE HYDROCHLORIDE 200 MG: 100 INJECTION, SOLUTION INTRAMUSCULAR; INTRAVENOUS at 07:55

## 2021-01-01 RX ADMIN — DOCUSATE SODIUM 100 MG: 50 LIQUID ORAL at 12:22

## 2021-01-01 RX ADMIN — HUMAN INSULIN 10 UNITS: 100 INJECTION, SOLUTION SUBCUTANEOUS at 11:13

## 2021-01-01 RX ADMIN — VANCOMYCIN HYDROCHLORIDE 1500 MG: 10 INJECTION, POWDER, LYOPHILIZED, FOR SOLUTION INTRAVENOUS at 22:25

## 2021-01-01 RX ADMIN — FENTANYL CITRATE 100 MCG: 50 INJECTION, SOLUTION INTRAMUSCULAR; INTRAVENOUS at 08:18

## 2021-01-01 RX ADMIN — Medication 0.4 MCG/KG/MIN: at 09:51

## 2021-01-01 RX ADMIN — SENNOSIDES 5 ML: 8.8 LIQUID ORAL at 12:23

## 2021-01-01 RX ADMIN — ALBUMIN HUMAN 250 ML: 0.05 INJECTION, SOLUTION INTRAVENOUS at 05:13

## 2021-01-01 RX ADMIN — SENNOSIDES 5 ML: 8.8 LIQUID ORAL at 07:55

## 2021-01-01 RX ADMIN — Medication 20 NG/KG/MIN: at 08:50

## 2021-01-01 RX ADMIN — CALCIUM CHLORIDE, MAGNESIUM CHLORIDE, SODIUM CHLORIDE, SODIUM BICARBONATE, POTASSIUM CHLORIDE AND SODIUM PHOSPHATE DIBASIC DIHYDRATE 12.5 ML/KG/HR: 3.68; 3.05; 6.34; 3.09; .314; .187 INJECTION INTRAVENOUS at 10:37

## 2021-01-01 RX ADMIN — IPRATROPIUM BROMIDE AND ALBUTEROL SULFATE 3 ML: .5; 3 SOLUTION RESPIRATORY (INHALATION) at 19:32

## 2021-01-01 RX ADMIN — MEROPENEM 1 G: 1 INJECTION, POWDER, FOR SOLUTION INTRAVENOUS at 04:26

## 2021-01-01 RX ADMIN — FLUCONAZOLE 200 MG: 200 INJECTION, SOLUTION INTRAVENOUS at 12:28

## 2021-01-01 RX ADMIN — CALCIUM CHLORIDE, MAGNESIUM CHLORIDE, SODIUM CHLORIDE, SODIUM BICARBONATE, POTASSIUM CHLORIDE AND SODIUM PHOSPHATE DIBASIC DIHYDRATE 12.5 ML/KG/HR: 3.68; 3.05; 6.34; 3.09; .314; .187 INJECTION INTRAVENOUS at 16:47

## 2021-01-01 RX ADMIN — HEPARIN SODIUM 5000 UNITS: 5000 INJECTION, SOLUTION INTRAVENOUS; SUBCUTANEOUS at 18:14

## 2021-01-01 RX ADMIN — CALCIUM CHLORIDE, MAGNESIUM CHLORIDE, SODIUM CHLORIDE, SODIUM BICARBONATE, POTASSIUM CHLORIDE AND SODIUM PHOSPHATE DIBASIC DIHYDRATE 12.5 ML/KG/HR: 3.68; 3.05; 6.34; 3.09; .314; .187 INJECTION INTRAVENOUS at 16:46

## 2021-01-01 RX ADMIN — CALCIUM CHLORIDE 2 G: 100 INJECTION, SOLUTION INTRAVENOUS at 10:51

## 2021-01-01 RX ADMIN — CALCIUM CHLORIDE 2 G: 100 INJECTION, SOLUTION INTRAVENOUS at 17:58

## 2021-01-01 RX ADMIN — ALBUMIN HUMAN 250 ML: 0.05 INJECTION, SOLUTION INTRAVENOUS at 08:34

## 2021-01-01 RX ADMIN — SODIUM CHLORIDE, POTASSIUM CHLORIDE, SODIUM LACTATE AND CALCIUM CHLORIDE 1000 ML: 600; 310; 30; 20 INJECTION, SOLUTION INTRAVENOUS at 10:19

## 2021-01-01 RX ADMIN — MEROPENEM 1 G: 1 INJECTION, POWDER, FOR SOLUTION INTRAVENOUS at 11:52

## 2021-01-01 RX ADMIN — HYDROMORPHONE HYDROCHLORIDE 0.3 MG: 1 INJECTION, SOLUTION INTRAMUSCULAR; INTRAVENOUS; SUBCUTANEOUS at 02:05

## 2021-01-01 RX ADMIN — Medication 50 MEQ: at 09:36

## 2021-01-01 RX ADMIN — PHENYLEPHRINE HYDROCHLORIDE 100 MCG: 10 INJECTION INTRAVENOUS at 09:35

## 2021-01-01 RX ADMIN — SODIUM CHLORIDE 2064 ML: 9 INJECTION, SOLUTION INTRAVENOUS at 19:52

## 2021-01-01 RX ADMIN — VANCOMYCIN HYDROCHLORIDE 1500 MG: 10 INJECTION, POWDER, LYOPHILIZED, FOR SOLUTION INTRAVENOUS at 23:27

## 2021-01-01 RX ADMIN — Medication 4 MG/HR: at 16:19

## 2021-01-01 RX ADMIN — MIDAZOLAM 2 MG: 1 INJECTION INTRAMUSCULAR; INTRAVENOUS at 14:11

## 2021-01-01 RX ADMIN — AMIODARONE HYDROCHLORIDE 1 MG/MIN: 50 INJECTION, SOLUTION INTRAVENOUS at 05:52

## 2021-01-01 RX ADMIN — SODIUM CHLORIDE 1000 ML: 9 INJECTION, SOLUTION INTRAVENOUS at 19:31

## 2021-01-01 RX ADMIN — MIDAZOLAM 2 MG: 1 INJECTION INTRAMUSCULAR; INTRAVENOUS at 09:04

## 2021-01-01 RX ADMIN — SODIUM CHLORIDE, POTASSIUM CHLORIDE, SODIUM LACTATE AND CALCIUM CHLORIDE 250 ML: 600; 310; 30; 20 INJECTION, SOLUTION INTRAVENOUS at 02:02

## 2021-01-01 RX ADMIN — PIPERACILLIN AND TAZOBACTAM 3.38 G: 3; .375 INJECTION, POWDER, LYOPHILIZED, FOR SOLUTION INTRAVENOUS at 19:43

## 2021-01-01 RX ADMIN — ROCURONIUM BROMIDE 80 MG: 10 INJECTION INTRAVENOUS at 08:50

## 2021-01-01 RX ADMIN — HYDROMORPHONE HYDROCHLORIDE 0.2 MG: 0.2 INJECTION, SOLUTION INTRAMUSCULAR; INTRAVENOUS; SUBCUTANEOUS at 22:08

## 2021-01-01 RX ADMIN — DEXTROSE MONOHYDRATE 25 G: 500 INJECTION PARENTERAL at 15:05

## 2021-01-01 RX ADMIN — ONDANSETRON 4 MG: 2 INJECTION INTRAMUSCULAR; INTRAVENOUS at 02:05

## 2021-01-01 RX ADMIN — HYDROCORTISONE SODIUM SUCCINATE 50 MG: 100 INJECTION, POWDER, FOR SOLUTION INTRAMUSCULAR; INTRAVENOUS at 10:00

## 2021-01-01 RX ADMIN — MIDAZOLAM 2 MG: 1 INJECTION INTRAMUSCULAR; INTRAVENOUS at 17:27

## 2021-01-01 RX ADMIN — CALCIUM CHLORIDE, MAGNESIUM CHLORIDE, SODIUM CHLORIDE, SODIUM BICARBONATE, POTASSIUM CHLORIDE AND SODIUM PHOSPHATE DIBASIC DIHYDRATE 12.5 ML/KG/HR: 3.68; 3.05; 6.34; 3.09; .314; .187 INJECTION INTRAVENOUS at 02:46

## 2021-01-01 RX ADMIN — MIDAZOLAM 2 MG: 1 INJECTION INTRAMUSCULAR; INTRAVENOUS at 22:27

## 2021-01-01 RX ADMIN — PANTOPRAZOLE SODIUM 40 MG: 40 INJECTION, POWDER, FOR SOLUTION INTRAVENOUS at 07:22

## 2021-01-01 RX ADMIN — Medication 50 MCG: at 07:25

## 2021-01-01 RX ADMIN — AMIODARONE HYDROCHLORIDE 0.5 MG/MIN: 50 INJECTION, SOLUTION INTRAVENOUS at 08:08

## 2021-01-01 RX ADMIN — Medication 50 MCG: at 20:52

## 2021-01-01 SDOH — HEALTH STABILITY: MENTAL HEALTH: HOW OFTEN DO YOU HAVE 6 OR MORE DRINKS ON ONE OCCASION?: NOT ASKED

## 2021-01-01 SDOH — HEALTH STABILITY: MENTAL HEALTH: HOW OFTEN DO YOU HAVE A DRINK CONTAINING ALCOHOL?: NOT ASKED

## 2021-01-01 SDOH — HEALTH STABILITY: MENTAL HEALTH: HOW MANY STANDARD DRINKS CONTAINING ALCOHOL DO YOU HAVE ON A TYPICAL DAY?: NOT ASKED

## 2021-01-01 ASSESSMENT — PAIN SCALES - GENERAL
PAINLEVEL: NO PAIN (0)
PAINLEVEL: MODERATE PAIN (5)

## 2021-01-01 ASSESSMENT — ENCOUNTER SYMPTOMS
MUSCLE CRAMPS: 0
BOWEL INCONTINENCE: 0
PARALYSIS: 0
MEMORY LOSS: 0
MYALGIAS: 1
BACK PAIN: 0
JAUNDICE: 1
VOMITING: 0
HEADACHES: 0
ABDOMINAL PAIN: 1
JOINT SWELLING: 0
DYSURIA: 0
TINGLING: 0
LIGHT-HEADEDNESS: 0
DIARRHEA: 0
WHEEZING: 0
RECTAL PAIN: 0
WEAKNESS: 0
SHORTNESS OF BREATH: 0
TROUBLE SWALLOWING: 0
LOSS OF CONSCIOUSNESS: 0
SEIZURES: 0
FLANK PAIN: 0
BACK PAIN: 1
CONFUSION: 0
CONSTIPATION: 1
NAUSEA: 0
PALPITATIONS: 0
ARTHRALGIAS: 1
NECK PAIN: 0
DIARRHEA: 0
COUGH: 0
NUMBNESS: 0
TREMORS: 0
DIFFICULTY URINATING: 1
BLOOD IN STOOL: 0
HEMATURIA: 0
NECK PAIN: 0
HEARTBURN: 1
MUSCLE WEAKNESS: 0
STIFFNESS: 0
FEVER: 0
DISTURBANCES IN COORDINATION: 0
VOMITING: 1
CHILLS: 1
SPEECH CHANGE: 0
DIZZINESS: 0
WEAKNESS: 1
BLOATING: 1
ABDOMINAL PAIN: 1
HEADACHES: 0
DYSURIA: 0
NAUSEA: 1
APPETITE CHANGE: 1
CONSTIPATION: 0
NUMBNESS: 0
ADENOPATHY: 0

## 2021-01-01 ASSESSMENT — ACTIVITIES OF DAILY LIVING (ADL)
ADLS_ACUITY_SCORE: 23
DIFFICULTY_EATING/SWALLOWING: OTHER (SEE COMMENTS)
ADLS_ACUITY_SCORE: 23
ADLS_ACUITY_SCORE: 23
DRESSING/BATHING_DIFFICULTY: OTHER (SEE COMMENTS)
ADLS_ACUITY_SCORE: 23
PATIENT_/_FAMILY_COMMUNICATION_STYLE: SPOKEN LANGUAGE (NON-ENGLISH)
INTERPRETER_SERVICES_OFFERED_TO_THE_PATIENT: YES
VISION_MANAGEMENT: GLASSES
EQUIPMENT_CURRENTLY_USED_AT_HOME: OTHER (SEE COMMENTS)
ADLS_ACUITY_SCORE: 23
ADLS_ACUITY_SCORE: 23
DOING_ERRANDS_INDEPENDENTLY_DIFFICULTY: OTHER (SEE COMMENTS)
ADLS_ACUITY_SCORE: 23
TOILETING_ISSUES: OTHER (SEE COMMENTS)
DIFFICULTY_COMMUNICATING: OTHER (SEE COMMENTS)
WALKING_OR_CLIMBING_STAIRS: OTHER (SEE COMMENTS)
ADLS_ACUITY_SCORE: 18
ADLS_ACUITY_SCORE: 18
WALKING_OR_CLIMBING_STAIRS_DIFFICULTY: OTHER (SEE COMMENTS)
ADLS_ACUITY_SCORE: 21
ADLS_ACUITY_SCORE: 23
ADLS_ACUITY_SCORE: 24
CONCENTRATING,_REMEMBERING_OR_MAKING_DECISIONS_DIFFICULTY: OTHER (SEE COMMENTS)
ADLS_ACUITY_SCORE: 18
ADLS_ACUITY_SCORE: 23
ADLS_ACUITY_SCORE: 21

## 2021-01-01 ASSESSMENT — MIFFLIN-ST. JEOR
SCORE: 1513
SCORE: 1476
SCORE: 1385.89
SCORE: 1353.13
SCORE: 1420.83
SCORE: 1449

## 2021-01-01 ASSESSMENT — LIFESTYLE VARIABLES: TOBACCO_USE: 1

## 2021-01-06 PROBLEM — I10 ESSENTIAL HYPERTENSION: Status: ACTIVE | Noted: 2019-11-07

## 2021-01-06 PROBLEM — M87.059 AVASCULAR NECROSIS OF BONE OF HIP (H): Status: ACTIVE | Noted: 2018-12-03

## 2021-01-06 NOTE — TELEPHONE ENCOUNTER
Patient was asked the following questions during liver intake call.     Referring Provider: Dr. Mandi Snowden   Referring Diagnosis:Liver Mass Left Lobe   PCP: Dr. Olvin Alvarado     1)Do you know why you have liver disease: Yes             If Alcoholic Cirrhosis is present when was your last drink: NA             Have you ever been through treatment for alcohol: No  2) Presence of Ascites: No Paracentesis: No  3) Presence of Hepatic Encephalopathy: No Medications: No  4) History of GI Bleeding: No  5) Oxygen Use: None  6) EGD: Yes Where: [facility] When: 2021  7) Colonoscopy: Yes Where: [facility] When:2020  8) MELD Score: No Current Liver Labs    9) Insurance information: Medicare/[employer]       Policy sanders: Self       Subscriber/policy/ID number: [id number]      Group Number: [id number]    Referral intake process completed.  Patient is aware that after financial approval is received, medical records will be requested.   Patient confirmed for a callback from transplant coordinator on 1/11/21.  Tentative evaluation date TBD.    Confirmed coordinator will discuss evaluation process in more detail at the time of their call.   Patient is aware of the need to arrange age appropriate cancer screening, vaccinations, and dental care.  Reminded patient to complete questionnaire, complete medical records release, and review packet prior to evaluation visit .  Assessed patient for special needs (ie--wheelchair, assistance, guardian, and ):   Needed  Patient instructed to call [phone] with questions.     Patient gave verbal consent during intake call to obtain medical records and documents outside of [facility]:  Yes    ALEX Kern, LPN   Solid Organ Transplant

## 2021-01-06 NOTE — LETTER
1/7/2021    Michelle Alvarado  [address] [address]        Dear Michelle,    Thank you for your interest in the Transplant Center at [facility], [facility]. We look forward to being a part of your care team and assisting you through the transplant process.    As we discussed, your transplant coordinator is Donna Lamas (Liver).  You may call your coordinator at any time with questions or concerns call [phone].    Please complete the following.    1. Fill out and return the enclosed forms    Authorization for Electronic Communication    Authorization to Discuss Protected Health Information    Authorization for Release of Protected Health Information    Authorization for Care Everywhere Release of Information    2. Sign up for:    [employer], access to your electronic medical record (see enclosed pamphlet)    Code for [employer], a transplant education website    You can use these tools to learn more about your transplant, communicate with your care team, and track your medical details      Sincerely,  Solid Organ Transplant  [facility], [facility]    cc: Referring Physician and PCP

## 2021-01-11 NOTE — TELEPHONE ENCOUNTER
Referred by: Dr. Mandi Snowden ()    Spoke with patient, daughter and granddaughter. Will review at TC and the assess treatment plan.      65 year old with new liver mass (1.8 cm) found incidently on imaging surveillance for lungs, long term smoker/lung Ca screening. Appears non cirrhotic. Quit drinking in 2018, reported binge drinking. CA 19-9 on 12/14/20 644, AFP 4.8. PMH: HTN, hyperlipidemia, avascular necrosis of hip ( deferring surgery, walks slowly and uses treadmill) , glucose intolerance.  No past surgeries    Colonoscopy - 12/20- 1 hyperplastic polyp      Social History  Lives with wife and his daughter and on.  Has 3 children  Working:Retired  ADL's:Independent      Nicotine:  Since age 12, quit 2018.          Plan: Outside read, TC on 1/15 and eval if appropriate.     Contacted patient and introduced myself as their Transplant Coordinator, also introduced the role of the Transplant Coordinator in the transplant process.  Explained the purpose of this call including reviewing next steps and answering questions.  Confirmed Referring Provider and Primary Care Physician. Notified patient of the importance of continued communication with referring providers and primary care physicians.  Will review at TC and schedule as appropriate.

## 2021-01-15 NOTE — TELEPHONE ENCOUNTER
Left message letting patient's daughter know she will be contacted to set up consult for her father. She should contact this RN as needed or if call has not been received within week.

## 2021-01-15 NOTE — PROGRESS NOTES
Michelle Alvarado discussed at the multidisciplinary tumor board on 1/15/21. The attendees may consist of representatives from hepatology, oncology, radiation oncology, surgical subspecialty including liver transplant, pathology and radiology.    Outside imaging 11/2020:  Concerning for CCA    Plan/Recommendations:  Refer to Surgical onc, order placed    ALEX YehN, RN  Liver transplant coordinator  [phone]

## 2021-01-18 NOTE — TELEPHONE ENCOUNTER
New Patient Oncology Nurse Navigator Note     Referring provider: Dr. Mcneal    Referring Clinic/Organization: [facility]     Referred to: Surgical Oncology -  Hepatobiliary / GI Cancers    Requested provider (if applicable): First available provider    Referral Received: 01/18/21       Evaluation for : Liver Mass      Clinical History (per Nurse review of records provided):      MRI 11/17/20  IMPRESSION:  Overall findings are worrisome for an intrahepatic versus periductal cholangiocarcinoma in the central left hepatic lobe measuring approximately 1.8 x 1.2 x 1.6 cm. No suspected metastases. Recommend GI consultation.    Lab:   : 644      Clinical Assessment / Barriers to Care (Per Nurse):    None at this time.       Records Location:     [facility] [place] Everywhere     Records Needed:     ABDOMINAL IMAGING (CT SCANS, MRI, US)  DATING BACK TO 2018      Additional testing needed prior to consult:     01/18/2021 3:16 PM - Called and confirmed telephone orders with read back for staging imaging with Dr. Goetz for:     MRI  CT CHEST    Referral updates and Plan:       Consult with Surgical Oncology   MRI   Chest CT      01/18/2021 3:09 PM - Called and spoke with patient's daughter and informed her that we received the referral and that we are reivewing plan for updated imaging with the provider.           Deb Lieberman, RN, BSN   Surgical Oncology New Patient Nurse Navigator  [facility]  [phone]

## 2021-01-19 NOTE — TELEPHONE ENCOUNTER
ONCOLOGY INTAKE: Records Information      APPT INFORMATION:  Referring provider:  Dr. Laxmi Mcneal MD  Referring provider s clinic:  [facility]  Reason for visit/diagnosis:  Liver cancer (H)  Has patient been notified of appointment date and time?: Yes    RECORDS INFORMATION:  Were the records received with the referral (via Rightfax)? No,Internal Referral      Has patient been seen for any external appt for this diagnosis? No    If yes, where? NA    ADDITIONAL INFORMATION:  None

## 2021-01-20 NOTE — TELEPHONE ENCOUNTER
RECORDS STATUS - ALL OTHER DIAGNOSIS      RECORDS RECEIVED FROM: [facility]   DATE RECEIVED: 1/20/21   NOTES STATUS DETAILS   OFFICE NOTE from referring provider Binh Mcneal   OFFICE NOTE from medical oncologist     DISCHARGE SUMMARY from hospital     DISCHARGE REPORT from the ER     OPERATIVE REPORT CE - HP 12/31/20: Endoscopy   MEDICATION LIST CE HP/PN   CLINICAL TRIAL TREATMENTS TO DATE     LABS     PATHOLOGY REPORTS [facility], Report in CE 12/31/20: [id number]    ANYTHING RELATED TO DIAGNOSIS     GENONOMIC TESTING     TYPE:     IMAGING (NEED IMAGES & REPORT)     CT SCANS PACS 11/13/20, 11/8/19: PN   MRI PACS 11/17/20: PN   MAMMO     ULTRASOUND     PET

## 2021-02-11 NOTE — PROGRESS NOTES
"[facility] - Surgical Oncology    NEW CONSULTATION  Feb 12, 2021    Reason for Visit:    Michelle Alvarado is a 66 year old male who presents with a left lobe liver mass highly concerning for intra-hepatic cholangiocarcinoma.  He was referred by Dr Mcneal.    Pertinent Oncologic History: 66 M w/ left lobe liver mass highly concerning for intra-hepatic cholangiocarcinoma.  This was discovered incidentally on high risk lung cancer screening given his smoking history.  CT of the chest 11/2020 showed the incidental finding of left intra-hepatic biliary dilation that led to further imaging via contrast enhanced MRI 11/17/2020.  This showed an approximately 2 cm left hepatic lobe bass encroaching on the hilum and causing obstruction of the left biliary system.  The left and right anterior branches of the portal vein appeared to be involved with tumor.  The patient was recently reviewed in multidisciplinary liver conference where it was felt that this lesion was highly concerning for non-metastatic, intra-hepatic cholangiocarcinoma.  The patient now presents for surgical opinion.    Pertinent Exam: Abdomen soft, non-tender, and non-distended.  Eyes w/ scleral icterus and skin jaundiced.  Patient otherwise appears fairly well.    /73 (BP Location: Right arm, Patient Position: Chair, Cuff Size: Adult Large)   Pulse 77   Temp 98.4  F (36.9  C) (Oral)   Resp 16   Ht 1.626 m (5' 4\")   Wt 70.5 kg (155 lb 6.4 oz)   SpO2 96%   BMI 26.67 kg/m      HISTORY OF PRESENT ILLNESS:  He noted that lately he has been feeling worse.  Has some burning/itching of the skin and he has noted that his stool is pale white and his urine very dark.  Has a history of HTN, HLD, and avascular necrosis of the hip for which he is deferring surgery.  No prior history of liver disease, but the patient has a heavy drinking history (quit) and prior smoking history.  Functional status is independent.  No prior abdominal " operations.  He does not take blood thinning medications.    Pertinent Work-Up/Findings:    Labs (2/12/2021): CBC normal.  CMP abnormal w/ bilirubin 5.1, alk phos 413, and AST//254.  Albumin 3.3.    CT Chest (11/13/2020): IMPRESSION:   1. No acute thoracic findings.  2. Stable 2 mm left upper lobe pulmonary nodule. No new or enlarging suspicious pulmonary nodules or masses.  3. Suggestion of focal intrahepatic biliary ductal dilation in the medial and lateral segments of the left lobe with parenchymal atrophy not seen on the prior study.    MRI w/ Contrast (11/17/2020): IMPRESSION: Overall findings are worrisome for an intrahepatic versus periductal cholangiocarcinoma in the central left hepatic lobe measuring approximately 1.8 x 1.2 x 1.6 cm. No suspected metastases. Recommend GI consultation.    CT Chest (2/11/2021): IMPRESSION:   1. New 2 mm nodule in the posterior left lower lobe. Recommend close attention on follow-up. An additional 2 mm nodule in the left upper lobe is stable since at least 11/2019.  2. Advanced coronary artery calcification.    MRI Liver (2/11/2021): IMPRESSION:    1. Increased size of left hepatic lobe mass measuring 2.7 x 2.6 cm, previously measured up to 1.8 cm. There is also biliary involvement now involving the central right hepatic duct and new involvement of the main hepatic artery. The left portal vein remains occluded though the right portal vein is not involved. Findings remain highly suspicious for cholangiocarcinoma.    Assessment/Counseling/Plan:    Michelle Alvarado is a 66 year old male with a left liver mass highly concerning for unresectable intra-hepatic cholangiocarcinoma.  The mass centers in segment 4 with involvement of the left portal vein and right anterior portal vein.  There is also new involvement of the right hepatic artery and the central right biliary system with resultant right sided biliary dilation.  There is no clear evidence of metastatic disease,  although there may be an enlarged node near the elizabeth and there is a new 2 mm pulmonary nodule.  We had a long discussion between the patient, daughter, and  regarding his likely diagnosis.  I discussed my concern regarding indeterminate findings and my concern that his tumor is unresectable given right hepatic artery involvement.  In addition, he has new biliary obstruction.  The patient was reviewed today in multi-disciplinary liver conference where these findings were confirmed.  The plan will be for expedient referral to interventional GI for ERCP w/ permanent stent, EUS, and FNA of suspicious lymph nodes and the primary liver mass for diagnosis given the patient is unresectable.  Once the patient has been decompressed and diagnosed, will plan on medical oncology referral for discussion of systemic therapy if the patient chooses to proceed with this.  Questions were answered and we will plan on a phone call early next week to review these issues and ensure understanding.  From our discussion today, he was in agreement with and understanding of the plan.  He knows to call and seek care if he develops any fevers or new abdominal pain.     Total time spent with the patient was 75 minutes, of which more than half was counseling.  45 minutes in face to face time, and 30 minutes in chart review, imaging review, tumor board discussion, coordination of care, and documentation.  A Hebrew  was used for the entirety of the face to face portion of the visit.

## 2021-02-12 NOTE — NURSING NOTE
"Oncology Rooming Note    February 12, 2021 10:01 AM   Michelle Alvarado is a 66 year old male who presents for:    Chief Complaint   Patient presents with     Oncology Clinic Visit     [facility] NEW - LIVER CANCER     Initial Vitals: /73 (BP Location: Right arm, Patient Position: Chair, Cuff Size: Adult Large)   Pulse 77   Temp 98.4  F (36.9  C) (Oral)   Resp 16   Ht 1.626 m (5' 4\")   Wt 70.5 kg (155 lb 6.4 oz)   SpO2 96%   BMI 26.67 kg/m   Estimated body mass index is 26.67 kg/m  as calculated from the following:    Height as of this encounter: 1.626 m (5' 4\").    Weight as of this encounter: 70.5 kg (155 lb 6.4 oz). Body surface area is 1.78 meters squared.  Moderate Pain (5) Comment: Data Unavailable   No LMP for male patient.  Allergies reviewed: Yes  Medications reviewed: Yes    Medications: Medication refills not needed today.  Pharmacy name entered into [facility]: [id number] - [place], [place] - [address] AT [facility] [address]    Clinical concerns: No new concerns. [place] was notified.      Donte Watkins LPN            "

## 2021-02-12 NOTE — PATIENT INSTRUCTIONS
Surgical Oncology RN Care Coordination Note:     - labs today.     - we will review your imaging and decide on plan.     - referral to GI for Endoscopic Ultrasound and biopsy of the lymph nodes and possible ERCP for stenting the bile ducts.     - we will follow up by phone with finalized plans.     Deb Lieberman RN, BSN  Care Coordinator

## 2021-02-12 NOTE — LETTER
"    2/12/2021         RE: Michelle Alvarado  [address] [address]        Dear Colleague,    Thank you for referring your patient, Michelle Alvarado, to the [facility]. Please see a copy of my visit note below.    [facility] - Surgical Oncology    NEW CONSULTATION  Feb 12, 2021    Reason for Visit:    Michelle Alvarado is a 66 year old male who presents with a left lobe liver mass highly concerning for intra-hepatic cholangiocarcinoma.  He was referred by Dr Mcneal.    Pertinent Oncologic History: 66 M w/ left lobe liver mass highly concerning for intra-hepatic cholangiocarcinoma.  This was discovered incidentally on high risk lung cancer screening given his smoking history.  CT of the chest 11/2020 showed the incidental finding of left intra-hepatic biliary dilation that led to further imaging via contrast enhanced MRI 11/17/2020.  This showed an approximately 2 cm left hepatic lobe bass encroaching on the hilum and causing obstruction of the left biliary system.  The left and right anterior branches of the portal vein appeared to be involved with tumor.  The patient was recently reviewed in multidisciplinary liver conference where it was felt that this lesion was highly concerning for non-metastatic, intra-hepatic cholangiocarcinoma.  The patient now presents for surgical opinion.    Pertinent Exam: Abdomen soft, non-tender, and non-distended.  Eyes w/ scleral icterus and skin jaundiced.  Patient otherwise appears fairly well.    /73 (BP Location: Right arm, Patient Position: Chair, Cuff Size: Adult Large)   Pulse 77   Temp 98.4  F (36.9  C) (Oral)   Resp 16   Ht 1.626 m (5' 4\")   Wt 70.5 kg (155 lb 6.4 oz)   SpO2 96%   BMI 26.67 kg/m      HISTORY OF PRESENT ILLNESS:  He noted that lately he has been feeling worse.  Has some burning/itching of the skin and he has noted that his stool is pale white and his urine very dark.  Has a history " of HTN, HLD, and avascular necrosis of the hip for which he is deferring surgery.  No prior history of liver disease, but the patient has a heavy drinking history (quit) and prior smoking history.  Functional status is independent.  No prior abdominal operations.  He does not take blood thinning medications.    Pertinent Work-Up/Findings:    Labs (2/12/2021): CBC normal.  CMP abnormal w/ bilirubin 5.1, alk phos 413, and AST//254.  Albumin 3.3.    CT Chest (11/13/2020): IMPRESSION:   1. No acute thoracic findings.  2. Stable 2 mm left upper lobe pulmonary nodule. No new or enlarging suspicious pulmonary nodules or masses.  3. Suggestion of focal intrahepatic biliary ductal dilation in the medial and lateral segments of the left lobe with parenchymal atrophy not seen on the prior study.    MRI w/ Contrast (11/17/2020): IMPRESSION: Overall findings are worrisome for an intrahepatic versus periductal cholangiocarcinoma in the central left hepatic lobe measuring approximately 1.8 x 1.2 x 1.6 cm. No suspected metastases. Recommend GI consultation.    CT Chest (2/11/2021): IMPRESSION:   1. New 2 mm nodule in the posterior left lower lobe. Recommend close attention on follow-up. An additional 2 mm nodule in the left upper lobe is stable since at least 11/2019.  2. Advanced coronary artery calcification.    MRI Liver (2/11/2021): IMPRESSION:    1. Increased size of left hepatic lobe mass measuring 2.7 x 2.6 cm, previously measured up to 1.8 cm. There is also biliary involvement now involving the central right hepatic duct and new involvement of the main hepatic artery. The left portal vein remains occluded though the right portal vein is not involved. Findings remain highly suspicious for cholangiocarcinoma.    Assessment/Counseling/Plan:    Michelle Alvarado is a 66 year old male with a left liver mass highly concerning for unresectable intra-hepatic cholangiocarcinoma.  The mass centers in segment 4 with involvement of  the left portal vein and right anterior portal vein.  There is also new involvement of the right hepatic artery and the central right biliary system with resultant right sided biliary dilation.  There is no clear evidence of metastatic disease, although there may be an enlarged node near the elizabeth and there is a new 2 mm pulmonary nodule.  We had a long discussion between the patient, daughter, and  regarding his likely diagnosis.  I discussed my concern regarding indeterminate findings and my concern that his tumor is unresectable given right hepatic artery involvement.  In addition, he has new biliary obstruction.  The patient was reviewed today in multi-disciplinary liver conference where these findings were confirmed.  The plan will be for expedient referral to interventional GI for ERCP w/ permanent stent, EUS, and FNA of suspicious lymph nodes and the primary liver mass for diagnosis given the patient is unresectable.  Once the patient has been decompressed and diagnosed, will plan on medical oncology referral for discussion of systemic therapy if the patient chooses to proceed with this.  Questions were answered and we will plan on a phone call early next week to review these issues and ensure understanding.  From our discussion today, he was in agreement with and understanding of the plan.  He knows to call and seek care if he develops any fevers or new abdominal pain.     Total time spent with the patient was 75 minutes, of which more than half was counseling.  45 minutes in face to face time, and 30 minutes in chart review, imaging review, tumor board discussion, coordination of care, and documentation.  A Zambian  was used for the entirety of the face to face portion of the visit.      Again, thank you for allowing me to participate in the care of your patient.        Sincerely,        Glenn Fung MD

## 2021-02-15 PROBLEM — R16.0 LIVER MASS: Status: ACTIVE | Noted: 2021-01-01

## 2021-02-15 NOTE — TELEPHONE ENCOUNTER
As referred by Dr Fung, scheduled with Dr. Sanford for   Procedure/Imaging/Clinic: EUS w/ FNA/ERCP  Physician: Juvenal  [email or web address]  Procedure length: 90 min  Anesthesia: Gen  Dx: liver mass  Tier: 2  Location: [facility]    Left message for daughter Cindy. Returned call:      Called to discuss with patient. Explained they can expect a call for date and time for procedure, will need a , someone to stay with them for 24 hours and should stay in town for 24 hours (within 45 min of Hospital) post procedure    Patient needs to get pre-op physical completed and will fax a copy to us along with bringing a hard copy with them. Fax number given. [phone] *If you do not get a preop physical, your procedure could be cancelled, patient voiced understanding*    Preop Plan: PCP will do, Olvin Alvardao      Med Review    Blood thinner -  no  ASA - yes, ok to continue  Diabetic - no    COVID test discussed:orders placed, scheduling number given    Patient Education r/t procedure:talked to daughter explained procedure. Discussed rescheduling med onc visit until after     Does patient have any history of gastric bypass/gastric surgery/altered panc/bili anatomy?no    A pre-op nurse will call 1-2 days prior to the procedure. Is advised to be NPO/no solid food 8 hours before the procedure. Ok to drink clear liquids (Water, Apple Juice or Gatorade) up to 2 hours prior to procedure.     Other specific details/comments:done     Verbalized understanding of all instructions. All questions answered.

## 2021-02-15 NOTE — TELEPHONE ENCOUNTER
ONCOLOGY INTAKE: Records Information      APPT INFORMATION:  Referring provider:  Dr. Glenn Fung MD  Referring provider s clinic:  [facility]   Reason for visit/diagnosis:  Liver mass, left lobe   Has patient been notified of appointment date and time?: Yes    RECORDS INFORMATION:  Were the records received with the referral (via Rightfax)? No,Internal Referral      Has patient been seen for any external appt for this diagnosis? No    If yes, where? NA      ADDITIONAL INFORMATION:  None

## 2021-02-16 NOTE — TELEPHONE ENCOUNTER
Action    Action Taken -2/16/21:    -Imaging previously resolved.    -[employer] Tracking/[facility] - Path: [id number]     RECORDS STATUS - ALL OTHER DIAGNOSIS      RECORDS RECEIVED FROM: [facility], [facility]   DATE RECEIVED:    NOTES STATUS DETAILS   OFFICE NOTE from referring provider     OFFICE NOTE from medical oncologist     DISCHARGE SUMMARY from hospital     DISCHARGE REPORT from the ER     OPERATIVE REPORT     MEDICATION LIST     CLINICAL TRIAL TREATMENTS TO DATE     LABS     PATHOLOGY REPORTS [facility], Report in CE, Slides requested 2/16 12/31/20: [id number]   ANYTHING RELATED TO DIAGNOSIS     GENONOMIC TESTING     TYPE:     IMAGING (NEED IMAGES & REPORT)     CT SCANS     MRI     MAMMO     ULTRASOUND     PET

## 2021-02-16 NOTE — TELEPHONE ENCOUNTER
I attempted to call the daughter to discuss updates in the plan of management, but I was diverted to voicemail.  Voicemail left with callback number.

## 2021-02-17 NOTE — TELEPHONE ENCOUNTER
MD's asking to move patients procedure up to 2/19 if possible. Called to see if 2/19 ok, Cindy said that day works. Preop scheduled with PAC on 2/18 (pt also had wellness physical on 2/2/, in CareEverywhere). Message sent to scheduling to see if we can move up case. Will follow up with daughter about schedule and covid test.     ML

## 2021-02-17 NOTE — TELEPHONE ENCOUNTER
FUTURE VISIT INFORMATION      SURGERY INFORMATION:    Date: 2.24.21    Location: [facility] OR     Surgeon:  Frandy Sanford     Anesthesia Type:  General     Procedure: ESOPHAGOGASTRODUODENOSCOPY, WITH FINE NEEDLE ASPIRATION BIOPSY, WITH ENDOSCOPIC ULTRASOUND GUIDANCE    Consult: 2.18.21    RECORDS REQUESTED FROM:       Primary Care Provider: Robert Alvarado

## 2021-02-17 NOTE — TELEPHONE ENCOUNTER
Returned call to daughter, arrive at 7am on Friday for 9am procedure. Reviewed NPO guidelines. Will get covid test today w/in [employer] system. PAC scheduled tomorrow.     ML

## 2021-02-18 NOTE — PROGRESS NOTES
Bic is a 66 year old who is being evaluated via a billable video visit.      How would you like to obtain your AVS? MyChart    Will anyone else be joining your video visit? Daughter    HPI           Review of Systems       Physical Exam       ANDREIA Barry LPN

## 2021-02-18 NOTE — ANESTHESIA PREPROCEDURE EVALUATION
Anesthesia Pre-Procedure Evaluation    Patient: Michelle Alvarado   MRN: [id number] : 1955        Preoperative Diagnosis: Liver mass [R16.0]   Procedure : Procedure(s):  ESOPHAGOGASTRODUODENOSCOPY, WITH FINE NEEDLE ASPIRATION BIOPSY, WITH ENDOSCOPIC ULTRASOUND GUIDANCE  ENDOSCOPIC RETROGRADE CHOLANGIOPANCREATOGRAPHY     Past Medical History:   Diagnosis Date     Essential hypertension 2019     Hyperlipidemia LDL goal <130      Liver mass      Low back pain radiating to left leg     Started in      Tobacco use disorder       Past Surgical History:   Procedure Laterality Date     NO HISTORY OF SURGERY        Allergies   Allergen Reactions     Naproxen GI Disturbance     Lisinopril Cough      Social History     Tobacco Use     Smoking status: Former Smoker     Packs/day: 1.00     Types: Cigarettes     Smokeless tobacco: Never Used   Substance Use Topics     Alcohol use: Not Currently     Alcohol/week: 10.0 standard drinks     Comment: Last drink 2020      Wt Readings from Last 1 Encounters:   21 70.5 kg (155 lb 6.4 oz)        Anesthesia Evaluation   Pt has had prior anesthetic. Type: MAC.    No history of anesthetic complications       ROS/MED HX  ENT/Pulmonary:     (+) tobacco use, Past use,     Neurologic:  - neg neurologic ROS     Cardiovascular:     (+) Dyslipidemia hypertension-----Taking blood thinners Pt has received instructions: Instructions Given to patient: Will hold until after procedure. No previous cardiac testing  (-) SILVEIRA   METS/Exercise Tolerance: 3 - Able to walk 1-2 blocks without stopping    Hematologic:  - neg hematologic  ROS     Musculoskeletal: Comment: Hip pain      GI/Hepatic: Comment: Liver mass. Increased indigestion. No N/V.    (+) GERD, Symptomatic, liver disease,     Renal/Genitourinary:  - neg Renal ROS     Endo:  - neg endo ROS     Psychiatric/Substance Use:  - neg psychiatric ROS     Infectious Disease:  - neg infectious disease ROS     Malignancy:   (+)  Malignancy, History of GI.GI CA Active status post.        Other:  - neg other ROS          Physical Exam    Airway   unable to assess     Mallampati: III   TM distance: > 3 FB   Neck ROM: full   Mouth opening: > 3 cm    Respiratory Devices and Support         Dental  no notable dental history     (+) upper dentures and lower dentures      Cardiovascular   cardiovascular exam normal          Pulmonary   pulmonary exam normal        breath sounds clear to auscultation           OUTSIDE LABS:  CBC:   Lab Results   Component Value Date    WBC 6.3 02/12/2021    WBC 5.9 07/18/2007    HGB 13.5 02/12/2021    HGB 15.4 07/18/2007    HCT 41.2 02/12/2021    HCT 46.3 07/18/2007     02/12/2021     07/18/2007     BMP:   Lab Results   Component Value Date     02/12/2021     11/24/2014    POTASSIUM 4.0 02/12/2021    POTASSIUM 3.6 12/14/2020    CHLORIDE 107 02/12/2021    CHLORIDE 109 11/24/2014    CO2 25 02/12/2021    CO2 28 11/24/2014    BUN 12 02/12/2021    BUN 10 11/24/2014    CR 0.96 02/12/2021    CR 0.88 12/14/2020     (H) 02/12/2021     (A) 12/14/2020     COAGS: No results found for: PTT, INR, FIBR  POC: No results found for: BGM, HCG, HCGS  HEPATIC:   Lab Results   Component Value Date    ALBUMIN 3.3 (L) 02/12/2021    PROTTOTAL 7.7 02/12/2021     (H) 02/12/2021     (H) 02/12/2021    ALKPHOS 413 (H) 02/12/2021    BILITOTAL 5.1 (H) 02/12/2021     OTHER:   Lab Results   Component Value Date    A1C 5.6 08/01/2007    JUWAN 9.1 02/12/2021       Anesthesia Plan    ASA Status:  3      Anesthesia Type: General.     - Airway: ETT   Induction: Intravenous.   Maintenance: Inhalation.        Consents    Anesthesia Plan(s) and associated risks, benefits, and realistic alternatives discussed. Questions answered and patient/representative(s) expressed understanding.     - Discussed with:  Patient         Postoperative Care       PONV prophylaxis: Ondansetron (or other 5HT-3), Dexamethasone  or Solumedrol     Comments:              PAC Discussion and Assessment    ASA Classification: 3  Case is suitable for: [place]  Anesthetic techniques and relevant risks discussed: GA  Invasive monitoring and risk discussed: No    Possibility and Risk of blood transfusion discussed: No            PAC Resident/NP Anesthesia Assessment: Michelle Alvarado is a 66 year old male scheduled to undergo ESOPHAGOGASTRODUODENOSCOPY, WITH FINE NEEDLE ASPIRATION BIOPSY, WITH ENDOSCOPIC ULTRASOUND GUIDANCE, ENDOSCOPIC RETROGRADE CHOLANGIOPANCREATOGRAPHY with Dr. Sanford on 2/19/21. He has the following specific operative considerations:   - RCRI : No serious cardiac risks.   - VTE risk: 3%  - LATA # of risks 3/8 = Intermediate risk   - Risk of PONV score = 2.  If > 2, anti-emetic intervention recommended.    --Left lobe liver mass concerning for cholangiocarcinoma. Above procedure now planned for further evaluation and management.   --HLD. Simvastatin at HS. HYPERTENSION. Will hold losartan on DOS. No other known cardiac history or symptoms. Imaging noted advanced coronary artery calcifications. Has never had cardiac testing. Able to walk slowly on treadmill, limited by hip pain. Due to urgent nature of case, will defer EKG to DOS. Order provided. Will hold aspirin until after procedure.  --Former smoker. Quit ~two years ago. Denies pulmonary symptoms. Intermediate risk for LATA.  --GERD, increasing indigestion. Will take omeprazole on DOS. TUMS prn and frequent use.  --Denies history of blood transfusion.   --Will require .         Patient was discussed with Dr Andrea.    Reviewed and Signed by PAC Mid-Level Provider/Resident  Mid-Level Provider/Resident: FIDELIA Go, CNS  Date: 2/18/21  Time: 3:40pm    Attending Anesthesiologist Anesthesia Assessment: Agree, proceed with EGD, get 12 L EKG in pre-op for baseline. as video evaluation,   possible CAD no sxs, severe calcifications of coronaries noted on chest ct and   angiogram not done.   Probable cholangiocarcinoma liver.Time sensitive procedure.  Reviewed and Signed by PAC Anesthesiologist  Anesthesiologist: Austin Díaz  Date: 2/18/21                     Deangelo Sterling MD

## 2021-02-18 NOTE — PATIENT INSTRUCTIONS
Preparing for Your Surgery      Name:  Michelle Alvarado   MRN:  [id number]   :  1955   Today's Date:  2021       Arriving for surgery:  Surgery date:  21  Arrival time:  07:00 am    Restrictions due to COVID 19:  One consistent visitor per patient is allowed  No ill visitors  All visitors must wear face mask     parking is available for anyone with mobility limitations or disabilities.  ([place]  24 hours/ 7 days a week[place] [facility] [place] 3:30 [place]    Please come to:   [facility] [address]  [address] [id number]  -    Please proceed to [address] on the 3rd floor. [phone]?     - ?If you are in need of directions, wheelchair or escort please stop at the Information Desk in the lobby.  Inform the information person that you are here for surgery; a wheelchair and escort to [address] will be provided.?     What can I eat or drink?  -  You may eat and drink normally for up to 8 hours before your surgery.   -  You may have clear liquids until 2 hours before surgery.    Examples of clear liquids:  Water  Clear broth  Juices (apple, white grape, white cranberry  and cider) without pulp  Noncarbonated, powder based beverages  (lemonade and Basilio-Aid)  Sodas (Sprite, 7-Up, ginger ale and seltzer)  Coffee or tea (without milk or cream)  Gatorade    -  No Alcohol for at least 24 hours before surgery     Which medicines can I take?    Hold Aspirin for 7 days before surgery.   Hold Multivitamins for 7 days before surgery.  Hold Supplements (fish oil) for 7 days before surgery.  Hold Ibuprofen (Advil, Motrin) for 1 day before surgery--unless otherwise directed by surgeon.  Hold Naproxen (Aleve) for 4 days before surgery.  Hold TUMS until after surgery.  -  PLEASE TAKE these medications the day of surgery:  Tylenol if needed; take all other scheduled morning medications.    How do I prepare myself?  - Please take 2 showers  before surgery using Scrubcare or Hibiclens soap.    Use this soap only from the neck to your toes.     Leave the soap on your skin for one minute--then rinse thoroughly.      You may use your own shampoo and conditioner; no other hair products.   - Please remove all jewelry and body piercings.  - No lotions, deodorants or fragrance.  - No makeup or fingernail polish.   - Bring your ID and insurance card.    - All patients are required to have a Covid-19 test within 4 days of surgery/procedure.      -Patients will be contacted by the [facility] scheduling team within 1 week of surgery to make an appointment.      - Patients may call the Scheduling team at [phone] if they have not been scheduled within 4 days of  surgery.      ALL PATIENTS GOING HOME THE SAME DAY OF SURGERY ARE REQUIRED TO HAVE A RESPONSIBLE ADULT TO DRIVE AND BE IN ATTENDANCE WITH THEM FOR 24 HOURS FOLLOWING SURGERY     Questions or Concerns:    - For any questions regarding the day of surgery or your hospital stay, please contact the Pre Admission Nursing Office at [phone].       - If you have health changes between today and your surgery please call your surgeon.       For questions after surgery please call your surgeons office.

## 2021-02-18 NOTE — H&P
Pre-Operative H & P     CC:  Preoperative exam to assess for increased cardiopulmonary risk while undergoing surgery and anesthesia.    Date of Encounter: 2021  Primary Care Physician:  Jenny Olvin Quiroga  Reason for visit: Liver mass [R16.0]  HPI  Michelle LOVE Alvarado is a 66 year old male who presents for pre-operative H & P in preparation for ESOPHAGOGASTRODUODENOSCOPY, WITH FINE NEEDLE ASPIRATION BIOPSY, WITH ENDOSCOPIC ULTRASOUND GUIDANCE, ENDOSCOPIC RETROGRADE CHOLANGIOPANCREATOGRAPHY with Dr. Sanford on 21 at [facility]. History is obtained from the patient and daughter via , and review of medical records.    At the beginning of this visit patient ID was confirmed using name and .     Video-Visit Details    Type of service:  Video Visit    Patient verbally consented to video service today: YES      Video Start Time: 1:53pm  Video End Time (time video stopped): 2:13pm    Originating Location (pt. Location): Home    Distant Location (provider location):  [facility]    Mode of Communication:  Video Conference via [employer] initiated and then transitioned to phone call with  assistance.    Patient who was recently evaluated by Dr. Fung for a left lobe liver mass concerning for intra-hepatic cholangiocarcinoma. The liver mass was discovered incidentally on CT imaging completed for lung cancer screening as he is a smoker. Dr. Fung felt that based on imaging, this tumor was unresectable and referred him for expedited ERCP with permanent stent, EUS, and FNA of suspicious lymph nodes and the primary liver mass for more definitive diagnosis and decompression. He was counseled for above procedures.    Patient's history is otherwise significant for HLD, HYPERTENSION, GERD, and smoking. He reports he quit smoking over two years ago. Today patient denies fever, but was chilled the other day. No cough, shortness of  breath, chest pain, irregular HR, or pain except for some chronic hip pain. He has had some itching and he is having increasing indigestion. Denies nausea or vomiting.    Past Medical History  Past Medical History:   Diagnosis Date     Essential hypertension 11/07/2019     Hyperlipidemia LDL goal <130      Liver mass      Low back pain radiating to left leg     Started in April, 2013     Tobacco use disorder        Past Surgical History  Past Surgical History:   Procedure Laterality Date     COLONOSCOPY  12/31/2020     NO HISTORY OF SURGERY         Hx of Blood transfusions/reactions: Denies.      Hx of abnormal bleeding or anti-platelet use: ASA 81 mg daily.    Menstrual history: No LMP for male patient.    Personal or FH with difficulty with Anesthesia:  Denies.     Prior to Admission Medications  Current Outpatient Medications   Medication Sig Dispense Refill     Ascorbic Acid (VITAMIN C) 500 MG CAPS Take by mouth every morning       aspirin (ASA) 81 MG chewable tablet Take 81 mg by mouth every morning        calcium carbonate 750 MG CHEW daily as needed        Calcium Carbonate-Vitamin D (CALCIUM 600 + D) 600-400 MG-UNIT tablet Take 1 tablet by mouth every morning  100 tablet 3     Glucosamine-Chondroit-Vit C-Mn (GLUCOSAMINE 1500 COMPLEX) CAPS Take by mouth every morning        losartan (COZAAR) 100 MG tablet Take 100 mg by mouth every morning        melatonin 1 MG CAPS At Bedtime        Omega-3 Fatty Acids 1200 MG capsule Take 1 capsule by mouth every morning  180 capsule 12     omeprazole (PRILOSEC) 20 MG DR capsule Take 20 mg by mouth every morning        simvastatin (ZOCOR) 40 MG tablet Take 1 tablet (40 mg) by mouth At Bedtime 90 tablet 3       Allergies  Allergies   Allergen Reactions     Naproxen GI Disturbance     Lisinopril Cough       Social History  Social History     Socioeconomic History     Marital status:      Spouse name: Not on file     Number of children: 3     Years of education: Not on  file     Highest education level: Not on file   Occupational History     Employer: [employer]   Social Needs     Financial resource strain: Not on file     Food insecurity     Worry: Not on file     Inability: Not on file     Transportation needs     Medical: Not on file     Non-medical: Not on file   Tobacco Use     Smoking status: Former Smoker     Packs/day: 1.00     Types: Cigarettes     Smokeless tobacco: Never Used     Tobacco comment: Reports quitting smoking two years ago   Substance and Sexual Activity     Alcohol use: Not Currently     Alcohol/week: 10.0 standard drinks     Comment: Last drink 2020     Drug use: No     Sexual activity: Not Currently   Lifestyle     Physical activity     Days per week: Not on file     Minutes per session: Not on file     Stress: Not on file   Relationships     Social connections     Talks on phone: Not on file     Gets together: Not on file     Attends Restorationism service: Not on file     Active member of club or organization: Not on file     Attends meetings of clubs or organizations: Not on file     Relationship status: Not on file     Intimate partner violence     Fear of current or ex partner: Not on file     Emotionally abused: Not on file     Physically abused: Not on file     Forced sexual activity: Not on file   Other Topics Concern     Parent/sibling w/ CABG, MI or angioplasty before 65F 55M? Not Asked   Social History Narrative    Native of [place], in [place] since        Family History  Family History   Problem Relation Age of Onset     Cerebrovascular Disease Father          age 78     Gastrointestinal Disease Mother          age 79     ROS/MED HISTORY  The complete review of systems is negative other than noted in the HPI or here.     ENT/Pulmonary:     (+) tobacco use, Past use,     Neurologic:  - neg neurologic ROS     Cardiovascular:     (+) Dyslipidemia hypertension-----Taking blood thinners Pt has received in  structions: Instructions Given  to patient: Will hold until after procedure. No previous cardiac testing  (-) SILVEIRA   METS/Exercise Tolerance: 3 - Able to walk 1-2 blocks without stopping    Hematologic:  - neg hematologic  ROS     Musculoskeletal: Comment: Hip pain      GI/Hepatic: Comment: Liver mass. Increased indigestion. No N/V.    (+) GERD, Symptomatic, liver disease,     Renal/Genitourinary:  - neg Renal ROS     Endo:  - neg endo ROS     Psychiatric/Substance Use:  - neg psychiatric ROS     Infectious Disease:  - neg infectious disease ROS     Malignancy:   (+) Malignancy, History of GI.GI CA Active status post.        Other:  - neg other ROS        LABS: Personally reviewed  CBC:   Lab Results   Component Value Date    WBC 6.3 02/12/2021    WBC 5.9 07/18/2007    HGB 13.5 02/12/2021    HGB 15.4 07/18/2007    HCT 41.2 02/12/2021    HCT 46.3 07/18/2007     02/12/2021     07/18/2007     BMP:   Lab Results   Component Value Date     02/12/2021     11/24/2014    POTASSIUM 4.0 02/12/2021    POTASSIUM 3.6 12/14/2020    CHLORIDE 107 02/12/2021    CHLORIDE 109 11/24/2014    CO2 25 02/12/2021    CO2 28 11/24/2014    BUN 12 02/12/2021    BUN 10 11/24/2014    CR 0.96 02/12/2021    CR 0.88 12/14/2020     (H) 02/12/2021     (A) 12/14/2020     COAGS: No results found for: PTT, INR, FIBR  POC: No results found for: BGM, HCG, HCGS  HEPATIC:   Lab Results   Component Value Date    ALBUMIN 3.3 (L) 02/12/2021    PROTTOTAL 7.7 02/12/2021     (H) 02/12/2021     (H) 02/12/2021    ALKPHOS 413 (H) 02/12/2021    BILITOTAL 5.1 (H) 02/12/2021     OTHER:   Lab Results   Component Value Date    A1C 5.6 08/01/2007    JUWAN 9.1 02/12/2021       Physical Exam  Constitutional: Awake, alert, no apparent distress, and appears stated age. Accompanied by daughter. Communication via .   Respiratory: No cough or obvious dyspnea.  Neuropsychiatric: Calm, cooperative. Normal affect.     Please refer to the physical  examination documented by the anesthesiologist in the anesthesia record on the day of surgery    EKG: None available.    2/11/21 MR Abd                                                                   IMPRESSION:    1. Increased size of left hepatic lobe mass measuring 2.7 x 2.6 cm, previously measured up to 1.8 cm. There is also biliary involvement now involving the central right hepatic duct and new involvement of the main hepatic artery. The left portal vein remains occluded though  the right portal vein is not involved. Findings remain highly  suspicious for cholangiocarcinoma.    2/11/21 CT Chest  IMPRESSION:   1. New 2 mm nodule in the posterior left lower lobe. Recommend close  attention on follow-up. An additional 2 mm nodule in the left upper  lobe is stable since at least 11/2019.  2. Advanced coronary artery calcification.    CT chest lung cancer screening 11/13/20  IMPRESSION:   1. No acute thoracic findings.  2. Stable 2 mm left upper lobe pulmonary nodule. No new or enlarging suspicious pulmonary nodules or masses.    ACR Lung-RADS Category 2: Benign appearance or behavior. Nodules with a very low likelihood of becoming a clinically active cancer due to size or lack of growth. Continue annual screening with low-dose chest CT in 12 months.     3. New intrahepatic biliary ductal dilation within the left lobe of the liver with parenchymal atrophy is nonspecific and incompletely evaluated on this exam but potentially may be sequela of malignancy such as cholangiocarcinoma. Further characterization is recommended with a dedicated contrast enhanced liver MRI within 2 weeks.    Imaging reviewed by this provider    Outside records reviewed from: Care Everywhere    ASSESSMENT and PLAN  Michelle Alvarado is a 66 year old male scheduled to undergo ESOPHAGOGASTRODUODENOSCOPY, WITH FINE NEEDLE ASPIRATION BIOPSY, WITH ENDOSCOPIC ULTRASOUND GUIDANCE, ENDOSCOPIC RETROGRADE CHOLANGIOPANCREATOGRAPHY with Dr. Sanford on  2/19/21. He has the following specific operative considerations:   - RCRI : No serious cardiac risks.   - Anesthesia considerations:  Refer to PAC assessment in anesthesia records  - VTE risk: 3%  - LATA # of risks 3/8 = Intermediate risk   - Risk of PONV score = 2.  If > 2, anti-emetic intervention recommended.    --Left lobe liver mass concerning for cholangiocarcinoma. Above procedure now planned for further evaluation and management.   --HLD. Simvastatin at HS. HYPERTENSION. Will hold losartan on DOS. No other known cardiac history or symptoms. Imaging noted advanced coronary artery calcifications. Has never had cardiac testing. Able to walk slowly on treadmill, limited by hip pain. Due to urgent nature of case, will defer EKG to DOS. Order provided.Will hold aspirin until after procedure.   --Former smoker. Quit ~two years ago. Denies pulmonary symptoms. Intermediate risk for LATA.  --GERD, increasing indigestion. Will take omeprazole on DOS. TUMS prn and frequent use.  --Denies history of blood transfusion.   --Will require .     Arrival time, NPO, shower and medication instructions provided by nursing staff today.       Patient was discussed with Dr Andrea.    FIDELIA Ward CNS  Preoperative Assessment Center  [facility]  Phone: [phone]  Fax: [phone]

## 2021-02-19 NOTE — PROGRESS NOTES
Went over discharge with pt's daughter. All questions answered and encouraged to call Dr with any further questions after discharge.     Anita Jain RN

## 2021-02-19 NOTE — OP NOTE
ERCP 02/19/2021  8:31 AM [facility], [place], operated by [facility], [address] [place] [address] [phone]     Endoscopy Department   _______________________________________________________________________________   Patient Name: Michelle Alvarado             Procedure Date: 2/19/2021 8:31 AM   MRN: [id number]                       Account Number: [id number]   YOB: 1955              Admit Type: Outpatient   Age: 66                               Room: OR   Gender: Male                          Note Status: Finalized   Attending MD: Frandy Sanford MD   Total Sedation Time:   _______________________________________________________________________________       Procedure:           ERCP   Indications:         Abnormal MRCP, Jaundice   Providers:           Frandy Sanford MD, Rell Tian MD   Patient Profile:     Mr Alvarado is a 67yo gentleman found to have clinical                        biliary obstruction prompting imaging which demonstrated                        a left lobe mass. He was thought to not be a surgical                        candiate and tandem EUS demonstated this liver mass                        (sampled) as well as a bifurcation lesion (possible                        direct extension) and distal common duct wall thickening                        (in sum concerning for cholangiocarcinoma) who now                        proceeds to ERCP for decompression.   Referring MD:        Glnen Fung   Requesting Provider: Deb Lieberman RN   Medicines:           General Anesthesia, Indomethacin 100 mg DC, Cipro 400 mg                        IV   Complications:       Wire perforation of a side branch during deep                        cannulation of the ansa as noted above   _______________________________________________________________________________   Procedure:           Pre-Anesthesia Assessment:                        - Prior to the procedure, a History and Physical  was                        performed, and patient medications and allergies were                        reviewed. The patient is competent. The risks and                        benefits of the procedure and the sedation options and                        risks were discussed with the patient. All questions                        were answered and informed consent was obtained. Patient                        identification and proposed procedure were verified by                        the nurse in the pre-procedure area. Mental Status                        Examination: alert and oriented. Airway Examination:                        Mallampati Class II (the uvula but not tonsillar pillars                        visualized). Respiratory Examination: clear to                        auscultation. CV Examination: normal. ASA Grade                        Assessment: III - A patient with severe systemic                        disease. After reviewing the risks and benefits, the                        patient was deemed in satisfactory condition to undergo                        the procedure. The anesthesia plan was to use general                        anesthesia. Immediately prior to administration of                        medications, the patient was re-assessed for adequacy to                        receive sedatives. The heart rate, respiratory rate,                        oxygen saturations, blood pressure, adequacy of                        pulmonary ventilation, and response to care were                        monitored throughout the procedure. The physical status                        of the patient was re-assessed after the procedure.                        After obtaining informed consent, the scope was passed                        under direct vision. Throughout the procedure, the                        patient's blood pressure, pulse, and oxygen saturations                        were monitored  "continuously. The duodenoscope was                        introduced through the mouth, and used to inject                        contrast into and used to inject contrast into the bile                        duct and ventral pancreatic duct. The ERCP was                        accomplished without difficulty. The patient tolerated                        the procedure well.                                                                                     Findings:        A  film of the right upper qudarant was unremarkable. Limited white        light imaging of the foregut was also unremarkable including views of        the ampulla. Initial cannulation of the bile duct was quite difficult        due to the distal malignant stenosis. The ventral pancreatic duct was        then deeply cannulated with the short-nosed traction sphincterotome in        concert with multiple wires including an 0.025\" and 0.018\". Contrast was        injected and I personally interpreted the pancreatic duct images. Ductal        flow of contrast was adequate, image quality was excellent and contrast        extended to the pancreatic duct. The main duct was diffusely thin and        normal with an ansa loop in the head. As dual wire or above the stent        cannulation of the bile duct was required, deep wire placement to the        ventral was required. In these attempts a small wire perforation occured        through a side branch. Ultimately an 0.018 was passed to the tail and        over this a 3 Fr by 12 cm Advanix stent with no external flaps and no        internal flaps was placed 10 cm into the ventral pancreatic duct. Clear        fluid flowed through the stent and the stent was in good position. The        bile duct could then be deeply cannulated with the short-nosed traction        sphincterotome and 0.018\" wire. Contrast was injected. Similar to the        EUS, there was a malignant stenosis involving the bifurcation and " left        insertion as well as another of the distal common duct. The right        intrahepatics were diffusely markedly dilated while the left did not        opacify. Attempts at wire passage to the left intrhaepatics across the        malignancy were not successful and contrast injection to the atrophic        left was avoided. There were no filling defects. A 7 mm biliary        sphincterotomy was made with a monofilament traction (standard)        sphincterotome using ERBE electrocautery. There was no        post-sphincterotomy bleeding. A 10 Fr by 12 cm transpapillary SF stent        with a single external flap and a single internal flap was placed 12 cm        into the right hepatic duct. Bile flowed through the stent and the stent        was in good position.                                                                                     Impression:          - Mod 22; difficult cannulation of the bile duct due to                        a distal malignant stenosis prompting initial                        cannulation of the ventral pancreatic duct                        - Grossly normal main ventral dominant main duct with                        downsream ansa loop which was difficult to deeply                        cannulate leading to small wire perforation of a side                        branch before placement of a 3F pancreatic stent across                        the loop to the body                        - Bismuth IIIL malginant stenosis with impressive right                        intrahepatic dilation; attempts to cannulate the                        atrophic left across the malignancy were limited to                        avoid contamination of these poorly draining ducts                        - Uncomplicated biliary sphincterotomy and placement of                        a 10F stent across both the bifurcation and distal                        common duct malignant stenoses   Recommendation:       - General anestheisa recovery with 2h observation                        followed by laboratories with disposition determined by                        course and results                        - We will check liver studies in 7 days; with continued                        evidence of biliary obstruction (symptoms or labs)                        repeat ERCP will be organized (within 2-3w) for more                        aggressive attempts at left intrahepatic drainage; with                        normalization of course and studies we will consider                        placement of an uncovered metal stent from the right                        intrahepatics across the bifurcatoin and distal common                        duct malignant stenoses                        - Complete a short course of antibiotic as prescribed                        - Avoid antithrombotics we                        - The findings and recommendations were discussed with                        the patient and their family                                                                                       electronically signed by JAVIER Sanford

## 2021-02-19 NOTE — OR NURSING
Pt meeting phase one completion criteria @ 1200     .  Alert and oriented times 4.  Denies pain or nausea.   Lungs cont equal and clear bi lat.  Tolerating wean to room air.    TO have post procedure labs drawn - reported to 3c RN           SBAR Hand off report to  Anita Sanders .

## 2021-02-19 NOTE — OR NURSING
Pt arrived to PACU - see times.  PACU Rn assumed care of pt @ 1105.   Pt arrived able to state name and deny pain or nausea at present time.  Lungs equal and clear bi lat; tolerating nasal cannula.    --  Additional assessment as per flowsheet.

## 2021-02-19 NOTE — ANESTHESIA PROCEDURE NOTES
Airway   Date/Time: 2/19/2021 8:21 AM   Patient location during procedure: OR  Staff -   CRNA: Schlatter, Charles Patrick, APRN CRNA  Performed By: CRNA    Consent for Airway   Urgency: elective    Indications and Patient Condition  Indications for airway management: alexa-procedural  Induction type:intravenousMask difficulty assessment: 1 - vent by mask    Final Airway Details  Final airway type: endotracheal airway  Successful airway:ETT - single  Endotracheal Airway Details   ETT size (mm): 7.5  Successful intubation technique: direct laryngoscopy  Grade View of Cords: 1  Adjucts: stylet  Measured from: gums/teeth  Secured at (cm): 21  Secured with: pink tape  Bite block used: None    Post intubation assessment   Placement verified by: capnometry, equal breath sounds and chest rise   Number of attempts at approach: 1  Number of other approaches attempted: 0  Secured with:pink tape  Ease of procedure: easy  Dentition: Intact and Unchanged

## 2021-02-19 NOTE — ANESTHESIA CARE TRANSFER NOTE
Patient: Michelle Alvarado    Procedure(s):  ENDOSCOPIC ULTRASOUND WITH FINE NEEDLE BIOPSIES  ENDOSCOPIC RETROGRADE CHOLANGIOPANCREATOGRAPHY WITH BILIARY SPHINCTEROTOMY AND STENT PLACEMENT, PANCREATIC DUCT STENT PLACEMENT    Diagnosis: Liver mass [R16.0]  Diagnosis Additional Information: No value filed.    Anesthesia Type:   General     Note:    Oropharynx: spontaneously breathing  Level of Consciousness: awake and drowsy  Oxygen Supplementation: face mask    Independent Airway: airway patency satisfactory and stable  Dentition: dentition unchanged  Vital Signs Stable: post-procedure vital signs reviewed and stable  Report to RN Given: handoff report given  Patient transferred to: PACU    Handoff Report: Identifed the Patient, Identified the Reponsible Provider, Reviewed the pertinent medical history, Discussed the surgical course, Reviewed Intra-OP anesthesia mangement and issues during anesthesia, Set expectations for post-procedure period and Allowed opportunity for questions and acknowledgement of understanding      Vitals: (Last set prior to Anesthesia Care Transfer)  CRNA VITALS  2/19/2021 1022 - 2/19/2021 1056      2/19/2021             Resp Rate (observed):  (!) 6        Electronically Signed By: Charles Patrick Schlatter, APRN CRNA  February 19, 2021  10:56 AM

## 2021-02-19 NOTE — ANESTHESIA POSTPROCEDURE EVALUATION
Patient: Michelle Alvarado    Procedure(s):  ENDOSCOPIC ULTRASOUND WITH FINE NEEDLE BIOPSIES  ENDOSCOPIC RETROGRADE CHOLANGIOPANCREATOGRAPHY WITH BILIARY SPHINCTEROTOMY AND STENT PLACEMENT, PANCREATIC DUCT STENT PLACEMENT    Diagnosis:Liver mass [R16.0]  Diagnosis Additional Information: No value filed.    Anesthesia Type:  General    Note:  Disposition: Outpatient   Postop Pain Control: Uneventful            Sign Out: Well controlled pain   PONV:    Neuro/Psych: Uneventful            Sign Out: Acceptable/Baseline neuro status   Airway/Respiratory: Uneventful            Sign Out: Acceptable/Baseline resp. status   CV/Hemodynamics: Uneventful            Sign Out: Acceptable CV status   Other NRE: NONE   DID A NON-ROUTINE EVENT OCCUR?          Last vitals:  Vitals:    02/19/21 0700 02/19/21 1105 02/19/21 1120   BP: 125/70 105/59 116/60   Pulse: 79 62 64   Resp: 16 13 15   Temp: 36.9  C (98.4  F) 35.5  C (95.9  F) 35.7  C (96.3  F)   SpO2: 98% 97% 100%       Last vitals prior to Anesthesia Care Transfer:  CRNA VITALS  2/19/2021 1022 - 2/19/2021 1122      2/19/2021             Resp Rate (observed):  12    EKG:  Sinus rhythm          Electronically Signed By: Annita Rhoades MD  February 19, 2021  11:28 AM

## 2021-02-19 NOTE — DISCHARGE INSTRUCTIONS
[facility], [place]  Same-Day Surgery ERCP Procedure   Adult Discharge Orders & Instructions     You had a procedure known as an Endoscopic Retrograde Cholangiopancreatography (ERCP). Your healthcare provider performed the ERCP to look at your bile or pancreatic ducts, and to locate and/or treat blockages (dilation, stenting, removal, etc.) in these ducts. Often biopsies, small samples of tissue, are taken to help diagnose and/or classify stages of disease growth. This procedure is used to diagnose diseases of the pancreas, bile ducts, pancreatic duct, liver, and gallbladder.     After your procedure   1. Make sure to clarify with your healthcare provider any diet restrictions (For example, clear liquid, low fat, no caffeine, etc.)   2. Do NOT take aspirin containing medications or any other blood-thinning medicines (anticoagulants) until your healthcare provider says it's OK.   3. You MAY be prescribed antibiotics, depending on what was done and/or found during your EUS, make sure to take antibiotics as prescribed by your healthcare provider    For 24 hours after surgery  1. Get plenty of rest.  A responsible adult must stay with you for at least 24 hours after you leave the hospital.   2. Do not drive or use heavy equipment.  If you have weakness or tingling, don't drive or use heavy equipment until this feeling goes away.  3. Do not drink alcohol.  4. Avoid strenuous or risky activities (gym, yoga, cycling, etc.).  Ask for help when climbing stairs.   5. You may feel lightheaded.  IF so, sit for a few minutes before standing.  Have someone help you get up.   6. If you have nausea (feel sick to your stomach): Drink only clear liquids such as apple juice, ginger ale, broth or 7-Up.  Rest may also help.  Be sure to drink enough fluids.  Move to a regular diet as you feel able.  7. If you feel bloated or have too much gas, use a heating pad on your belly to help reduce the discomfort.  This should help you feel better.   8. You may have a slight fever. This is normal for the first 24 hours.   9. You may have a dry mouth, a sore throat, muscle aches or trouble sleeping.  These are normal and will go away after 24 hours. A sore throat is most common. Use lozenges or gargle with salt water to ease the discomfort.   10. Do not make important or legal decisions.      Call your doctor for any of the followin. Chest pain, and/or shortness of breath  2. Abdominal  pain, bloating or cramping that has not improved or does not respond to pain reliving medications (Tylenol or narcotics if prescribed)   3. Difficulty swallowing or feeling as though food or liquids are stuck in your throat   4. Sore throat lasting more than 2 days or pain that has worsened over time   5. Black or tarry stools   6. Nausea and/or vomiting that is not resolving or has not responded to anti-nausea medications prescribed to you   7. It has been over 8 to 10 hours since surgery and you are still not able to urinate (pass water)   8. Headache for over 24 hours   9. Fever over 100.5 F (38 C) lasting more than 24 hours after the procedure   10. Signs of jaundice or blockage (fever, chills, abdominal pain, yellowing of the whites of your eyes, yellowing of your skin, and/or passing darker than normal urine)     To contact a doctor, call:   [ ]  Dr Sanford at [phone] (clinic)   (Monday thru Friday 8:00am to 4:30pm)   [ ] [phone] and ask for the _GI  resident on call (answered 24 hours a day)   [ ] Emergency Department: [facility]: [phone]     Take it easy when you get home.  Remember, same day surgery DOES NOT MEAN SAME DAY RECOVERY!  Healing is a gradual process.  You will need some time to recover - you may be more tired than you realize at first.  Rest and relax for at least the first 24 hours at home.  You'll feel better and heal faster if you take good care of yourself.

## 2021-02-19 NOTE — OP NOTE
Upper EUS 02/19/2021  8:31 AM [facility], [address] [place] [address] [phone]     Endoscopy Department   _______________________________________________________________________________   Patient Name: Michelle Alvarado             Procedure Date: 2/19/2021 8:31 AM   MRN: [id number]                       Account Number: [id number]   YOB: 1955              Admit Type: Outpatient   Age: 66                               Room: OR   Gender: Male                          Note Status: Finalized   Attending MD: Frandy Sanford MD   Total Sedation Time:   _______________________________________________________________________________       Procedure:           Upper EUS   Indications:         Suspected mass in liver on CT scan   Providers:           Frandy Sanford MD, Rell Tian MD   Patient Profile:     Mr Alvarado is a 65yo gentleman found to have clinical                        biliary obstruction prompting imaging which demonstrated                        a left lobe mass. He was thought to not be a surgical                        candiate and is now referred for primary lesion sampling                        by EUS and for subsequent ERCP for decompression.   Referring MD:        Glenn Fung   Requesting Provider: Deb Lieberman RN   Medicines:           General Anesthesia, Cipro 400 mg IV   Complications:       No immediate complications.   _______________________________________________________________________________   Procedure:           Pre-Anesthesia Assessment:                        - Prior to the procedure, a History and Physical was                        performed, and patient medications and allergies were                        reviewed. The patient is competent. The risks and                        benefits of the procedure and the sedation options and                        risks were discussed with the patient. All questions                         were answered and informed consent was obtained. Patient                        identification and proposed procedure were verified by                        the nurse in the pre-procedure area. Mental Status                        Examination: alert and oriented. Airway Examination:                        Mallampati Class II (the uvula but not tonsillar pillars                        visualized). Respiratory Examination: clear to                        auscultation. CV Examination: normal. ASA Grade                        Assessment: III - A patient with severe systemic                        disease. After reviewing the risks and benefits, the                        patient was deemed in satisfactory condition to undergo                        the procedure. The anesthesia plan was to use general                        anesthesia. Immediately prior to administration of                        medications, the patient was re-assessed for adequacy to                        receive sedatives. The heart rate, respiratory rate,                        oxygen saturations, blood pressure, adequacy of                        pulmonary ventilation, and response to care were                        monitored throughout the procedure. The physical status                        of the patient was re-assessed after the procedure.                        After obtaining informed consent, the endoscope was                        passed under direct vision. Throughout the procedure,                        the patient's blood pressure, pulse, and oxygen                        saturations were monitored continuously. The                        echoendoscope was introduced through the mouth, and                        advanced to the second part of duodenum. The upper EUS                        was accomplished without difficulty. The patient                        tolerated the procedure well.                                                                                      Findings:        White light and endosonographic findings :        A curved linear was utilized throughout. Limited white light imaging of        the foregut was grossly unremarkable including views of the ampulla. A        large (40+mm x 30+mm), irregular, hypoechoic mass with irregular borders        was identified endosonographically in the left lobe of the liver. There        was marked upstream biliary dilation in the atrophic lobe. The primary        mass appeared confined to the liver parenchyma, however a seperate focus        (or perhaps extension) was identified involving the bifurcation with        dilation of the right main and loss of the left main insertion. This        measured [id number] and was also hypoechoic, though more well defined.        There was seperate focus of irregular wall thickening of the distal        common duct. There was no suggestion of choledocholithiasis. The        gallbladder is in situ and without wall thickening or internal stone.        The pancreatic parenchyma was diffusely homogeneous without lesion or        lobularity and the main duct was traced from tail to head and found to        be decompressed with a loop in the head. Several round, hypoechoic, well        defined lymph nodes approaching 1cm in size were found in the periportal        region. There was no other abdominal lymphadenopathy. The major        abdominal vessels including the gastroduodenal, portal, splenics,        superior mesenterics and celiac were not involved with the lesions and        appeared traditional. Fine needle biopsy was performed of both a        periportal node (site 1) and the left lobe mass (site 2). Color Doppler        imaging was utilized prior to needle puncture to confirm a lack of        significant vascular structures within the needle path. Two passes were        made with a 22g US needle to sample the node and five passes  were made        with another 22g ultrasound biopsy needle to sample the mass, both using        a transduodenal approach. A visible core of tissue was obtained with        each. Preliminary cytologic examination and touch preps were performed        on some . The cellularity of the specimen was adequate. Final cytology        results are pending.                                                                                     Impression:          - Multiple concerning lesions were demonstrated                        including a large irregular mass involving the left lobe                        (sampled, thought to be malignanct), a mass (perhaps                        direct extensio) involving the bifurcation and and                        irregular wall thickening of the distal common                        - Impressive left greater than right intrahepatic                        dilation was appreciated                        - Concerning periportal nodes (sampled, thought to be                        benign) without other foci of abdominal lymphadenopathy                        - Unremarkable gallbladder                        - Grossly unremarkable pancreas with thin ventral                        dominant main duct                        - Traditional major abdominal vasculature not involved                        with the lesions                        - These findings in combination are concerning for                        cholangiocarcinoma and less likely some hepatocellular                        carcinoma   Recommendation:      - Proceed to ERCP as planned for biliary decompression                        - Avoid antithrombotics for at least three days                        - Dr Sanford to communicate the results of cytology when                        available; if malignancy is confirmed we will plan to                        refer to medical oncology for management decisions; he                         has already seen Dr Fung                        - The findings and recommendations were discussed with                        the patient and their family                                                                                       electronically signed by JAVIER Sanford

## 2021-02-19 NOTE — OR NURSING
Dr. Sanford paged regarding post-op labs, Amylase 89 and Lipase 1074. Ok for discharge, MD discussed with patient. Script sent to pharmacy for oxycodone

## 2021-02-21 PROBLEM — K83.09 CHOLANGITIS (H): Status: ACTIVE | Noted: 2021-01-01

## 2021-02-21 PROBLEM — N17.9 ACUTE RENAL FAILURE, UNSPECIFIED ACUTE RENAL FAILURE TYPE (H): Status: ACTIVE | Noted: 2021-01-01

## 2021-02-21 PROBLEM — K85.80 OTHER ACUTE PANCREATITIS, UNSPECIFIED COMPLICATION STATUS: Status: ACTIVE | Noted: 2021-01-01

## 2021-02-21 PROBLEM — C22.1 CHOLANGIOCARCINOMA (H): Status: ACTIVE | Noted: 2021-01-01

## 2021-02-21 NOTE — ED PROVIDER NOTES
[place] EMERGENCY DEPARTMENT ([facility])  2021  History     Chief Complaint   Patient presents with     Abdominal Pain     HPI  Michelle Alvarado is a 66 year old male with a past medical history significant for left liver lobe mass s/p EUS and ERCP (2021), HTN, HLD, avascular necrosis of hip who presents here to the Emergency Department due to sharp epigastric and right upper quadrant pain.  Patient reports new abdominal pain since his EUS/ERCP on .  He states the pain has worsened overnight.  Patient reports nausea and two episodes of emesis yesterday.   He is having low grade fever and chills today. He has no diarrhea. Last BM was 4 days ago. He has had decreased urine output. He has no URI symptoms, cough, sputum, shortness of breath, leg pain or swelling. He took oral Cipro yesterday and today for post procedure prophylaxis.      PAST MEDICAL HISTORY:   Past Medical History:   Diagnosis Date     Essential hypertension 2019     Hyperlipidemia LDL goal <130      Liver mass      Low back pain radiating to left leg     Started in      Tobacco use disorder        PAST SURGICAL HISTORY:   Past Surgical History:   Procedure Laterality Date     COLONOSCOPY  2020     NO HISTORY OF SURGERY         Past medical history, past surgical history, medications, and allergies were reviewed with the patient. Additional pertinent items: EUS and ERCP (2021)    FAMILY HISTORY:   Family History   Problem Relation Age of Onset     Cerebrovascular Disease Father          age 78     Gastrointestinal Disease Mother          age 79       SOCIAL HISTORY:   Social History     Tobacco Use     Smoking status: Former Smoker     Packs/day: 1.00     Types: Cigarettes     Smokeless tobacco: Never Used     Tobacco comment: Reports quitting smoking two years ago   Substance Use Topics     Alcohol use: Not Currently     Alcohol/week: 10.0 standard drinks     Comment: Last drink  12/1/2020     Social history was reviewed with the patient. Additional pertinent items: None      Patient's Medications   New Prescriptions    No medications on file   Previous Medications    ASCORBIC ACID (VITAMIN C) 500 MG CAPS    Take by mouth every morning    ASPIRIN (ASA) 81 MG CHEWABLE TABLET    Take 81 mg by mouth every morning     CALCIUM CARBONATE 750 MG CHEW    daily as needed     CIPROFLOXACIN (CIPRO) 500 MG TABLET    Take 1 tablet (500 mg) by mouth 2 times daily for 5 days    GLUCOSAMINE-CHONDROIT-VIT C-MN (GLUCOSAMINE 1500 COMPLEX) CAPS    Take by mouth every morning     LOSARTAN (COZAAR) 100 MG TABLET    Take 100 mg by mouth every morning     MELATONIN 1 MG CAPS    At Bedtime     OMEGA-3 FATTY ACIDS 1200 MG CAPSULE    Take 1 capsule by mouth every morning     OMEPRAZOLE (PRILOSEC) 20 MG DR CAPSULE    Take 20 mg by mouth every morning     OXYCODONE-ACETAMINOPHEN (PERCOCET) 5-325 MG TABLET    Take 1 tablet by mouth every 4 hours as needed for pain    SIMVASTATIN (ZOCOR) 40 MG TABLET    Take 1 tablet (40 mg) by mouth At Bedtime   Modified Medications    No medications on file   Discontinued Medications    No medications on file          Allergies   Allergen Reactions     Naproxen GI Disturbance     Lisinopril Cough        Review of Systems   Constitutional: Positive for appetite change and chills. Negative for fever.   HENT: Negative for congestion and trouble swallowing.    Eyes: Negative for visual disturbance.   Respiratory: Negative for cough, shortness of breath and wheezing.    Cardiovascular: Negative for chest pain, palpitations and leg swelling.   Gastrointestinal: Positive for abdominal pain, constipation, nausea and vomiting. Negative for diarrhea.   Genitourinary: Negative for dysuria.   Musculoskeletal: Negative for back pain and neck pain.   Skin: Negative for rash.   Neurological: Negative for weakness, light-headedness, numbness and headaches.   Hematological: Negative for adenopathy.    Psychiatric/Behavioral: Negative for confusion.         Physical Exam   BP: (!) 88/43  Pulse: 94  Temp: 98.5  F (36.9  C)  Resp: 18  SpO2: 96 %      Physical Exam  Vitals signs and nursing note reviewed.   Constitutional:       Appearance: He is well-developed.   HENT:      Head: Normocephalic and atraumatic.      Right Ear: External ear normal.      Left Ear: External ear normal.      Nose: Nose normal.      Mouth/Throat:      Mouth: Mucous membranes are moist.   Eyes:      General: No scleral icterus.     Extraocular Movements: Extraocular movements intact.      Pupils: Pupils are equal, round, and reactive to light.   Neck:      Musculoskeletal: Normal range of motion and neck supple.   Cardiovascular:      Rate and Rhythm: Normal rate and regular rhythm.      Heart sounds: No murmur.   Pulmonary:      Effort: Pulmonary effort is normal.      Breath sounds: Normal breath sounds. No wheezing or rales.   Abdominal:      Tenderness: There is abdominal tenderness in the right upper quadrant. There is no guarding.   Musculoskeletal:      Right lower leg: No edema.      Left lower leg: No edema.   Skin:     General: Skin is warm and dry.   Neurological:      General: No focal deficit present.      Mental Status: He is alert and oriented to person, place, and time.      Cranial Nerves: No cranial nerve deficit.      Motor: No weakness.   Psychiatric:         Mood and Affect: Mood normal.         Behavior: Behavior normal.         ED Course        Procedures             EKG Interpretation:      Interpreted by JOSH NY MD  Time reviewed: 1942  Symptoms at time of EKG: None   Rhythm: normal sinus   Rate: Normal  Axis: Normal  Ectopy: none  Conduction: normal  ST Segments/ T Waves: No ST-T wave changes and No acute ischemic changes  Q Waves: none  Comparison to prior: No old EKG available    Clinical Impression: normal EKG                The patient has signs of Severe sepsis       If one the following conditions  is present, a 30 mL/kg bolus is recommended as part of the 6 hour bundle (IBW can be used for BMI >30, or document refusal/contraindication):      1.   Initial hypotension  defined as 2 bps < 90 or map < 65 in the 6hrs before or 6hrs after time zero.     2.  Lactate >4.     The patient has signs of Severe Sepsis as evidenced by:    1. 2 SIRS criteria, AND  2. Suspected infection, AND   3. Organ dysfunction:  SBP <90, MAP < 65, or SBP decrease of >40 from baseline due to infection, Lactic Acidosis with value >2.0, ARF with Cr >2 due to infection and Bilirubin > 2 due to infection    Time severe sepsis diagnosis confirmed: 1855 02/21/21 as this was the time when Lactate resulted, and the level was > 2.0 and Lab results revealing acute organ dysfunction due to infection (Cr, Bili, Plt)    3 Hour Severe Sepsis Bundle Completion:    1. Initial Lactic Acid Result:   Recent Labs   Lab Test 02/21/21  1855   LACT 4.3*     2. Blood Cultures before Antibiotics: Yes  3. Broad Spectrum Antibiotics Administered:  yes       Anti-infectives (From admission through now)    Start     Dose/Rate Route Frequency Ordered Stop    02/21/21 1930  piperacillin-tazobactam (ZOSYN) 3.375 g vial to attach to  mL bag      3.375 g  over 30 Minutes Intravenous ONCE 02/21/21 1928 02/21/21 2030          4. Fluid volume administered in ED:  Full 30 mL/kg bolus given (see amount below).    BMI Readings from Last 1 Encounters:   02/19/21 26.87 kg/m      30 mL/kg fluids based on weight: 2,060 mL  30 mL/kg fluids based on IBW (must be >= 60 inches tall): 1,710 mL                Severe Sepsis reassessment:  1. Repeat Lactic Acid Level: 3.2          Labs/Imaging    Results for orders placed or performed during the hospital encounter of 02/21/21 (from the past 24 hour(s))   Comprehensive metabolic panel   Result Value Ref Range    Sodium 128 (L) 133 - 144 mmol/L    Potassium 4.6 3.4 - 5.3 mmol/L    Chloride 98 94 - 109 mmol/L    Carbon Dioxide 20 20 -  32 mmol/L    Anion Gap 9 3 - 14 mmol/L    Glucose 110 (H) 70 - 99 mg/dL    Urea Nitrogen 45 (H) 7 - 30 mg/dL    Creatinine 3.63 (H) 0.66 - 1.25 mg/dL    GFR Estimate 16 (L) >60 mL/min/[1.73_m2]    GFR Estimate If Black 19 (L) >60 mL/min/[1.73_m2]    Calcium 6.5 (L) 8.5 - 10.1 mg/dL    Bilirubin Total 10.3 (H) 0.2 - 1.3 mg/dL    Albumin 2.2 (L) 3.4 - 5.0 g/dL    Protein Total 6.1 (L) 6.8 - 8.8 g/dL    Alkaline Phosphatase 243 (H) 40 - 150 U/L    ALT 86 (H) 0 - 70 U/L     (H) 0 - 45 U/L   Lactic acid whole blood   Result Value Ref Range    Lactic Acid 4.3 (HH) 0.7 - 2.0 mmol/L   CBC with platelets differential   Result Value Ref Range    WBC 6.9 4.0 - 11.0 10e9/L    RBC Count 3.78 (L) 4.4 - 5.9 10e12/L    Hemoglobin 11.8 (L) 13.3 - 17.7 g/dL    Hematocrit 34.4 (L) 40.0 - 53.0 %    MCV 91 78 - 100 fl    MCH 31.2 26.5 - 33.0 pg    MCHC 34.3 31.5 - 36.5 g/dL    RDW 14.6 10.0 - 15.0 %    Platelet Count 339 150 - 450 10e9/L    Diff Method Manual Differential     % Neutrophils 52.5 %    % Lymphocytes 19.0 %    % Monocytes 13.5 %    % Eosinophils 0.0 %    % Basophils 0.0 %    % Metamyelocytes 10.5 %    % Myelocytes 4.5 %    Absolute Neutrophil 3.6 1.6 - 8.3 10e9/L    Absolute Lymphocytes 1.3 0.8 - 5.3 10e9/L    Absolute Monocytes 0.9 0.0 - 1.3 10e9/L    Absolute Eosinophils 0.0 0.0 - 0.7 10e9/L    Absolute Basophils 0.0 0.0 - 0.2 10e9/L    Absolute Metamyelocytes 0.7 (H) 0 10e9/L    Absolute Myelocytes 0.3 (H) 0 10e9/L   Lipase   Result Value Ref Range    Lipase 4,183 (H) 73 - 393 U/L   Blood culture    Specimen: Arm, Forearm Only, Right; Blood    Right Hand   Result Value Ref Range    Specimen Description Blood Right Hand     Culture Micro PENDING    Blood culture    Specimen: Hand, Right; Blood    Right Arm   Result Value Ref Range    Specimen Description Blood Right Arm     Culture Micro PENDING    EKG 12-lead, tracing only   Result Value Ref Range    Interpretation ECG Click View Image link to view waveform and  result    CT Abdomen Pelvis w/o Contrast    Narrative    EXAMINATION: CT ABDOMEN PELVIS W/O CONTRAST  2/21/2021 8:26 PM      CLINICAL HISTORY: RUQ pain and chills after ERCP and stenting.  Cholangiocarcinoma    COMPARISON: Abdominal MR 2/11/2021 and CT chest 2/11/2021        PROCEDURE COMMENTS: CT of the abdomen and pelvis was obtained without  contrast. Coronal and sagittal formatted images were obtained.     FINDINGS:    Lower Thorax:  Small left pleural effusion with associated compressive atelectasis.  Trace right pleural effusion. Bibasilar opacities.    Abdomen/pelvis:  3.0 x 2.8 cm hypoattenuating lesion within the left hepatic lobe with  diffuse intrahepatic biliary dilatation throughout the left hepatic  lobe. Pneumobilia with common bile duct stent extending into the  intrahepatic biliary ducts. Pancreatic duct stent also in place. There  is extensive fat stranding about the pancreas and mesentery without  focal fluid collection. Small amount of retained contrast in the  gallbladder. The spleen, adrenals, and and kidneys are normal in  appearance. Stable simple right renal cysts. Urinary bladder appears  unremarkable. Few prominent fluid-filled loops of small bowel.  Moderate colonic stool burden with fecalization of distal small bowel  loops. There is wall thickening of the colon as it courses near the  pancreas, likely reactive. The appendix is normal. No free  intraperitoneal air. Small amount of free fluid within the pelvis. The  infrarenal aorta is of normal caliber. Aortoiliac atherosclerotic  calcifications. Multiple prominent and borderline enlarged mesenteric  lymph nodes. Enlarged elizabeth hepatis lymph node measuring up to 12 mm.     Bones:   Multilevel degenerative changes of the spine. 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 Ankylosis of bilateral  sacroiliac joints. No suspicious or aggressive appearing bone lesions.      Impression    IMPRESSION:  1. Extensive  peripancreatic inflammatory changes concerning for acute  pancreatitis.  2. 3.0 x 2.8 cm hypoattenuating lesion within the left hepatic lobe  with diffuse hepatic biliary dilatation in the left hepatic lobe,  better characterized on prior MR from 2/11/2021.  3. Pneumobilia, likely related to recent ERCP with biliary stent and  pancreatic duct stents in place.  4. Small left and trace right pleural effusions with overlying  atelectasis and consolidation.    I have personally reviewed the examination and initial interpretation  and I agree with the findings.    RANDALL MCNAIR MD            Medications   0.9% sodium chloride BOLUS (0 mLs Intravenous Stopped 2/21/21 2120)     Followed by   sodium chloride 0.9% infusion ( Intravenous Not Given 2/21/21 2120)   sodium chloride 0.9% infusion ( Intravenous New Bag 2/21/21 2118)   HYDROmorphone (DILAUDID) injection 0.2 mg (has no administration in time range)   piperacillin-tazobactam (ZOSYN) 3.375 g vial to attach to  mL bag (0 g Intravenous Stopped 2/21/21 2030)   0.9% sodium chloride BOLUS (0 mL/kg × 68.8 kg Intravenous Stopped 2/21/21 2120)             Assessments & Plan (with Medical Decision Making)   Impression:  Older middle aged gentleman presents with upper abdominal pain and chills. He had ERCP and stent placement for obstruction from liver mass, likely cholangiocarcinoma 2 days ago. The procedure was technically difficult. He has had anorexia and low urine output for the past 2 days and had a couple of episodes of vomiting. Today he is having chills. He has been taking prophylactic cipro following the procedure. He appears moderately ill. He was hypotensive on arrival with blood pressure of 80/45. He has diffuse upper abdominal pain worse int he epigastrium and RUQ. He has no guarding or rigidity. He is not febrile. Labs are notable for elevated lipase in the 4000 range. Lactate is elevated at 4.3. He has acute rise in bilirubin from 5 to 10 and has  improvement of hepatocellular enzymes with persistently elevated alkaline phosphatase. Notably, he has acute renal failure with rise of creatinine from 1 to 3.6 and has new hyponatremia. He was treated for acute sepsis with 30 ml/kg fluid bolus and was started on IV zosyn after obtaining blood cultures. Abdominal CT scan was done without contrast due to current renal function. This has diffuse peripancreatic fat stranding and inflammatory change consistent with acute pancreatitis. The stent is in place in the bile duct and there is pneumobilia. Blood pressure improved after fluid bolus to the /70 range. He remains tachycardic. He is experiencing some increase in pain.Procalcitonin is significantly elevated supporting infectious component such as cholangitis.    I have reviewed the nursing notes.    I have reviewed the findings, diagnosis, plan and need for follow up with the patient.    New Prescriptions    No medications on file       Final diagnoses:   Other acute pancreatitis, unspecified complication status   Cholangitis   Cholangiocarcinoma (H)   Acute renal failure, unspecified acute renal failure type (H)       2/21/2021   [facility] EMERGENCY DEPARTMENT     Brody Spivey MD  02/22/21 0021

## 2021-02-22 PROBLEM — N17.0 ACUTE KIDNEY FAILURE WITH TUBULAR NECROSIS (H): Status: ACTIVE | Noted: 2021-01-01

## 2021-02-22 NOTE — PROCEDURES
PREPROCEDURE DIAGNOSIS: Shock  POSTPROCEDURE DIAGNOSIS: same   PROCEDURE:  CVC Placement  SURGEON: Edward  ANESTHESIA: GETA and 1% lidocaine  EBL: 10 cc   FINDINGS: Adequately placed CVC, confirmed by xray  COMPLICATIONS: None apparent   SPECIMEN: none   OPERATIVE INDICATIONS: Michelle Alvarado is a 66 year old female/male admitted urgently to the ICU from the emergency department.  Needs central access for vasopressor and IV fluids.   OPERATIVE PROCEDURE:   Informed consent was attempted to be made prior to our procedure from daughter, Laura Conley.   The patient's anatomical landmarks were reviewed prior to the procedure with ultrasound guidance assistance.  A timeout procedure was performed. The patient was positioned in supine position with the left internal jugular and surrounding neck area prepped in.  The patient was prepped and draped in the usual sterile fashion. The surgical site was numbed with 1% lidocaine. The left IJ was well visualized on ultrasound. Ultrasound was used throughout the entire procedure for real time guidance. The left internal jugular was accessed with the access needle. A wire was passed through the needle. The introducer needle was removed over the wire. Ultrasound guidance was used to confirm left internal jugular access and not another named vessel. An 11 blade was used to open the skin by the wire. The dilator was used to open the subcutaneous tissues. The CVC line was inserted over the wire.The CVC line was sutured to the skin. A good waveform was demonstrated. A sterile dressing was placed over the CVC line.   Dr. Leon Jaimes was present for all parts of this case.   DESIRE Banuelos  [facility]  Intensivist Service

## 2021-02-22 NOTE — ED NOTES
Critical Lab notification:  Ionized Ca: 2.2  Potassium: 7.8  Lactic: 4.8    MD paged, labs redrawn.

## 2021-02-22 NOTE — PHARMACY-VANCOMYCIN DOSING SERVICE
Pharmacy Vancomycin Initial Note  Date of Service 2021  Patient's  1955  66 year old, male    Indication: Intra-abdominal infection    Current estimated CrCl = Estimated Creatinine Clearance: 17 mL/min (A) (based on SCr of 3.63 mg/dL (H)).    Creatinine for last 3 days  2021:  7:47 AM Creatinine 1.00 mg/dL  2021:  6:55 PM Creatinine 3.63 mg/dL    Recent Vancomycin Level(s) for last 3 days  No results found for requested labs within last 72 hours.      Vancomycin IV Administrations (past 72 hours)      No vancomycin orders with administrations in past 72 hours.                Nephrotoxins and other renal medications (From now, onward)    Start     Dose/Rate Route Frequency Ordered Stop    21 2300  vancomycin 1500 mg in 0.9% NaCl 250 ml intermittent infusion 1,500 mg      1,500 mg  over 90 Minutes Intravenous ONCE 21  vancomycin place sanders - receiving intermittent dosing      1 each Intravenous SEE ADMIN INSTRUCTIONS 21            Contrast Orders - past 72 hours (72h ago, onward)    None                Plan:  1.  Start vancomycin  1500 mg IV once, then intermittent dosing in the setting of FANG (~22mg/kg).   2.  Goal Trough Level: 15-20 mg/L empirically  3.  Pharmacy will check trough levels as appropriate in 1-3 Days.    4. Serum creatinine levels will be ordered daily for the first week of therapy and at least twice weekly for subsequent weeks.    5. [place] method utilized to dose vancomycin therapy: Method 2    Demetrice Chiang [facility]

## 2021-02-22 NOTE — ANESTHESIA PROCEDURE NOTES
Airway   Date/Time: 2/22/2021 8:50 AM   Patient location during procedure: ICU  Staff -   CRNA: Nola Kohler APRN CRNA  Performed By: CRNA  Referred By: andre bueno    Consent for Airway   Urgency: emergentConsent: No emergent situation. Verbal consent obtained.  Consent given by: patient  Patient identity confirmed: verbally with patient      Report Obtained from Primary Care Team  History regarding most recent potassium obtained: Yes  History regarding presence/absence of renal failure obtained:Yes  History regarding stroke/CVA obtained:Yes  History regarding presence/absence of NM disorder:Yes    Indications and Patient Condition  Indications for airway management: airway protection, hemodynamic instability and respiratory insufficiency  Mallampati: Not AssessedInduction type:intravenousMask difficulty assessment: 0 - not attempted    Final Airway Details  Final airway type: endotracheal airway  Successful airway:ETT - single  Endotracheal Airway Details   ETT size (mm): 8.0  Cuffed: yes  Successful intubation technique: video laryngoscopy  Grade View of Cords: 1  Adjucts: stylet  Measured from: lips  Secured at (cm): 22  Secured with: commercial tube sanders  Bite block used: None    Post intubation assessment   ETT secured, Vent settings by primary/ICU team, Primary/ICU team to review CXR, Sedation to be ordered by primary/ICU team and No apparent complications  Placement verified by: capnometry, equal breath sounds and chest rise   Number of attempts at approach: 2  Secured with:commercial tube sanders  Ease of procedure: easy  Dentition: Intact

## 2021-02-22 NOTE — PROGRESS NOTES
[facility]   ED Nurse to Floor Handoff     Michelle Alvarado is a 66 year old male who speaks Tristanian and lives with family members,  in a home  They arrived in the ED by car from home    ED Chief Complaint: Abdominal Pain    ED Dx;   Final diagnoses:   Other acute pancreatitis, unspecified complication status   Cholangitis   Cholangiocarcinoma (H)   Acute renal failure, unspecified acute renal failure type (H)         Needed?: Yes    Allergies:   Allergies   Allergen Reactions     Naproxen GI Disturbance     Lisinopril Cough   .  Past Medical Hx:   Past Medical History:   Diagnosis Date     Essential hypertension 11/07/2019     Hyperlipidemia LDL goal <130      Liver mass      Low back pain radiating to left leg     Started in April, 2013     Tobacco use disorder       Baseline Mental status: WDL  Current Mental Status changes: at basesline    Infection present or suspected this encounter: yes other pancreatitis   Sepsis suspected: Yes  Isolation type: No active isolations  Patient tested for COVID 19 prior to admission: NO     Activity level - Baseline/Home:  Stand with Assist  Activity Level - Current:   Stand with Assist    Bariatric equipment needed?: No    In the ED these meds were given:   Medications   0.9% sodium chloride BOLUS (0 mLs Intravenous Stopped 2/21/21 2120)     Followed by   sodium chloride 0.9% infusion ( Intravenous Not Given 2/21/21 2120)   sodium chloride 0.9% infusion ( Intravenous New Bag 2/21/21 2118)   vancomycin 1500 mg in 0.9% NaCl 250 ml intermittent infusion 1,500 mg (has no administration in time range)   vancomycin place sanders - receiving intermittent dosing (has no administration in time range)   piperacillin-tazobactam (ZOSYN) 3.375 g vial to attach to  mL bag (0 g Intravenous Stopped 2/21/21 2030)   0.9% sodium chloride BOLUS (0 mL/kg × 68.8 kg Intravenous Stopped 2/21/21 2120)   HYDROmorphone (DILAUDID) injection 0.2  mg (0.2 mg Intravenous Given 2/21/21 2208)       Drips running?  Yes    Home pump  No    Current LDAs  Peripheral IV 02/21/21 Anterior;Left Lower forearm (Active)   Number of days: 0       Peripheral IV 02/21/21 Right Upper forearm (Active)   Number of days: 0       Labs results:   Labs Ordered and Resulted from Time of ED Arrival Up to the Time of Departure from the ED   COMPREHENSIVE METABOLIC PANEL - Abnormal; Notable for the following components:       Result Value    Sodium 128 (*)     Glucose 110 (*)     Urea Nitrogen 45 (*)     Creatinine 3.63 (*)     GFR Estimate 16 (*)     GFR Estimate If Black 19 (*)     Calcium 6.5 (*)     Bilirubin Total 10.3 (*)     Albumin 2.2 (*)     Protein Total 6.1 (*)     Alkaline Phosphatase 243 (*)     ALT 86 (*)      (*)     All other components within normal limits   LACTIC ACID WHOLE BLOOD - Abnormal; Notable for the following components:    Lactic Acid 4.3 (*)     All other components within normal limits   CBC WITH PLATELETS DIFFERENTIAL - Abnormal; Notable for the following components:    RBC Count 3.78 (*)     Hemoglobin 11.8 (*)     Hematocrit 34.4 (*)     Absolute Metamyelocytes 0.7 (*)     Absolute Myelocytes 0.3 (*)     All other components within normal limits   LIPASE - Abnormal; Notable for the following components:    Lipase 4,183 (*)     All other components within normal limits   PROCALCITONIN - Abnormal; Notable for the following components:    Procalcitonin 39.74 (*)     All other components within normal limits   LACTIC ACID WHOLE BLOOD   INFLUENZA A/B & SARS-COV2 PCR MULTIPLEX   LACTIC ACID WHOLE BLOOD   NURSING DRAW AND HOLD   CARDIAC CONTINUOUS MONITORING   BLOOD CULTURE   BLOOD CULTURE       Imaging Studies:   Recent Results (from the past 24 hour(s))   CT Abdomen Pelvis w/o Contrast    Narrative    EXAMINATION: CT ABDOMEN PELVIS W/O CONTRAST  2/21/2021 8:26 PM      CLINICAL HISTORY: RUQ pain and chills after ERCP and  stenting.  Cholangiocarcinoma    COMPARISON: Abdominal MR 2/11/2021 and CT chest 2/11/2021        PROCEDURE COMMENTS: CT of the abdomen and pelvis was obtained without  contrast. Coronal and sagittal formatted images were obtained.     FINDINGS:    Lower Thorax:  Small left pleural effusion with associated compressive atelectasis.  Trace right pleural effusion. Bibasilar opacities.    Abdomen/pelvis:  3.0 x 2.8 cm hypoattenuating lesion within the left hepatic lobe with  diffuse intrahepatic biliary dilatation throughout the left hepatic  lobe. Pneumobilia with common bile duct stent extending into the  intrahepatic biliary ducts. Pancreatic duct stent also in place. There  is extensive fat stranding about the pancreas and mesentery without  focal fluid collection. Small amount of retained contrast in the  gallbladder. The spleen, adrenals, and and kidneys are normal in  appearance. Stable simple right renal cysts. Urinary bladder appears  unremarkable. Few prominent fluid-filled loops of small bowel.  Moderate colonic stool burden with fecalization of distal small bowel  loops. There is wall thickening of the colon as it courses near the  pancreas, likely reactive. The appendix is normal. No free  intraperitoneal air. Small amount of free fluid within the pelvis. The  infrarenal aorta is of normal caliber. Aortoiliac atherosclerotic  calcifications. Multiple prominent and borderline enlarged mesenteric  lymph nodes. Enlarged elizabeth hepatis lymph node measuring up to 12 mm.     Bones:   Multilevel degenerative changes of the spine. 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 Ankylosis of bilateral  sacroiliac joints. No suspicious or aggressive appearing bone lesions.      Impression    IMPRESSION:  1. Extensive peripancreatic inflammatory changes concerning for acute  pancreatitis.  2. 3.0 x 2.8 cm hypoattenuating lesion within the left hepatic lobe  with diffuse hepatic biliary dilatation in the  left hepatic lobe,  better characterized on prior MR from 2/11/2021.  3. Pneumobilia, likely related to recent ERCP with biliary stent and  pancreatic duct stents in place.  4. Small left and trace right pleural effusions with overlying  atelectasis and consolidation.    I have personally reviewed the examination and initial interpretation  and I agree with the findings.    RANDALL MCNAIR MD       Recent vital signs:   /48   Pulse 108   Temp 98.5  F (36.9  C) (Oral)   Resp 10   SpO2 94%     Amanda Coma Scale Score: 15 (02/21/21 1842)       Cardiac Rhythm: Normal Sinus  Pt needs tele? No  Skin/wound Issues: None    Code Status: Full Code    Pain control: good    Nausea control: good    Abnormal labs/tests/findings requiring intervention:     Family present during ED course? Yes   Family Comments/Social Situation comments:     Tasks needing completion: None    Vinay Novak RN  [facility] --   [phone] [place] [facility]  [phone] [facility]

## 2021-02-22 NOTE — ED NOTES
Pt is s/p ERCP 2/19 and has not voided since 0300 this morning and reports epigastric pain, nausea and vomiting. Pt feels very weak at this time. Pt states that he has tried po intake but can not hold fluid down. Pt has not had a bowel movement in over 3 days.

## 2021-02-22 NOTE — H&P
[facility]    History and Physical - [facility] Night Service        Date of Admission:  2/22/2021    Assessment & Plan   Michelle Alvarado is a 66 year old male with PMH of left liver lobe mass c/f cholangiocarcinoma s/p EUS and ERCP (2/19/2021), HTN, and HLD who presented to the ED on 2/21 with sharp epigastric and RUQ pain, and was admitted for post-ERCP pancreatitis, severe sepsis, and significant acute kidney injury.    # Severe sepsis  # Acute pancreatitis:  # Acute hypoxic respiratory failure:  # Hyperbilirubinemia:  # Possible cholangiocarcinoma:  Pancreatitis in the setting of recent ERCP. Given rising TBili, challenging ERCP and canalization, labs, and presentation, concern for biliary obstruction, intra-abdominal infection, cholangitis.  No previous O2 needs reported. Patient was on up to ~5L down in the ED after being initially on room air. Differential for this includes severe sepsis, ARDS, pulmonary edema (in setting of significant IVF resuscitation) with unknown prior cardiac function.   S/p 3L IVF in the ED.  - Lactate checks  - Continue Zosyn, Vancomycin  - LR @ 250 ml/hour  - GI Consult (ED already discussed case with GI, with plans for early AM evaluation, discussion about ERCP)  - NPO  - Echo (in the setting of severe sepsis, AHRF, significant IVF resuscitation)  - Triglycerides  - Low threshold to discuss patient with ICU if patient becomes hypotensive again in the setting of significant IVF resuscitation.    # Acute Kidney Injury  # Hyponatremia:  Significant interval worsening of his renal function (Cr 0.96 on 2/12, Cr 3.63, BUN 45 on presentation), with reported anuria-oliguria. Likely pre-renal vs ATN in the setting of severe sepsis, pancreatitis.  - Serum osm, urine osm, urine lytes, UA  - Strict I/Os  - Will monitor closely for potential dialysis needs.     # Hypocalcemia:  - Ionized calcium check    # Normocytic Anemia:  Hgb 11.8 on presentation.  Baseline ~13-14.  - Monitor CBC    # HTN: holding PTA losartan in the setting of severe sepsis, hypotension  # HLD: continue PTA ASA, statin       Diet:  NPO  Fluids: LR @ 250 ml/hour  DVT Prophylaxis: Pneumatic Compression Devices  Ramirez Catheter: not present  Code Status:  Full Code         Disposition Plan   Expected discharge: 4 - 7 days, recommended to disposition pending once SIRS/Sepsis treated.  Entered: Malick Ying MD 02/22/2021, 12:55 AM       The patient will be formally staffed in the AM.    Malick Ying MD  [facility]  Contact information available via [facility] Paging/Directory  Please see sign in/sign out for up to date coverage information  ______________________________________________________________________    Chief Complaint   Abdominal pain    History is obtained from the patient, chart review    History of Present Illness   Michelle Alvarado is a 66 year old male with PMH of left liver lobe mass c/f cholangiocarcinoma s/p EUS and ERCP (2/19/2021), HTN, and HLD who presented to the ED on 2/21 with sharp epigastric and RUQ pain, new since his EUS/ERCP.    He was being worked up for a left lobe liver mass (discovered on a lung cancer screening CT), concerning for intra-hepatic cholangiocarcinoma. Subsequent MRI imaging revealed a left hepatic lobe mass causing obstruction of the left biliary system. He was referred to interventional GI for an ERCP w/ stent placement and EUS, done on 2/19, and notable for challenging cannulation. He was discharged with plans for a short course of antibiotics and follow-up labs and ERCP over the next 1-3 weeks.    He reports worsening epigastric and RUQ pain since the procedure on 2/19, with nausea, vomiting, fevers, and chills. He has also had decreased urine output, urinating for the first in several days while in the ED. He has also been experiencing significant constipation. Denies hematemesis,  melena, or hematochezia. He endorses a smoking history, alcohol use (last ~2 weeks ago).    Vitally, he was afebrile, tachycardic to the 100s-110s. He was initially hypotensive to the 80s/40s on presentation, which improved after IVF resuscitation, but has been fluctuating since then. He was requiring 5L NC when seen in the ED. Labs were notable for Na 128, BUN 45, Cr 3.63, Ca 6.5, TBili 10.3, Alk Phos 243, ALT 86, , Lactate 4.3 (3.2 on repeat), Lipase 4183, Procal 39.74, Hgb 11.8, WBC 6.9. Blood cultures are pending. COVID, Influenza A/B, and RSV were negative. CT Abdomen/Pelvis showed extensive peripancreatic inflammatory changes concerning for acute pancreatitis, a hypoattenuating lesion in the left hepatic lobe with diffuse biliary dilatation, and pneumobilia.    GI was consulted and recommended NPO, blood cultures, broad spectrum antibiotics, and aggressive IVF resuscitation. They plan on seeing him in AM to discuss timing of repeat ERCP. He was given Vancomycin, Zosyn, 3L IVF bolus, started on a mIVF, and admitted to medicine for further work-up and management.      Review of Systems    The 10 point Review of Systems is negative other than noted in the HPI     Past Medical History    I have reviewed this patient's medical history and updated it with pertinent information if needed.   Past Medical History:   Diagnosis Date     Essential hypertension 11/07/2019     Hyperlipidemia LDL goal <130      Liver mass      Low back pain radiating to left leg     Started in April, 2013     Tobacco use disorder        Past Surgical History   I have reviewed this patient's surgical history and updated it with pertinent information if needed.  Past Surgical History:   Procedure Laterality Date     COLONOSCOPY  12/31/2020     NO HISTORY OF SURGERY         Social History   I have reviewed this patient's social history     Family History   I have reviewed this patient's family history and updated it with pertinent  information if needed.  Family History   Problem Relation Age of Onset     Cerebrovascular Disease Father          age 78     Gastrointestinal Disease Mother          age 79       Prior to Admission Medications   Prior to Admission Medications   Prescriptions Last Dose Informant Patient Reported? Taking?   Ascorbic Acid (VITAMIN C) 500 MG CAPS   Yes No   Sig: Take by mouth every morning   Glucosamine-Chondroit-Vit C-Mn (GLUCOSAMINE 1500 COMPLEX) CAPS   Yes No   Sig: Take by mouth every morning    Omega-3 Fatty Acids 1200 MG capsule   Yes No   Sig: Take 1 capsule by mouth every morning    aspirin (ASA) 81 MG chewable tablet   Yes No   Sig: Take 81 mg by mouth every morning    calcium carbonate 750 MG CHEW   Yes No   Sig: daily as needed    ciprofloxacin (CIPRO) 500 MG tablet   No No   Sig: Take 1 tablet (500 mg) by mouth 2 times daily for 5 days   losartan (COZAAR) 100 MG tablet   Yes No   Sig: Take 100 mg by mouth every morning    melatonin 1 MG CAPS   Yes No   Sig: At Bedtime    omeprazole (PRILOSEC) 20 MG DR capsule   Yes No   Sig: Take 20 mg by mouth every morning    oxyCODONE-acetaminophen (PERCOCET) 5-325 MG tablet   No No   Sig: Take 1 tablet by mouth every 4 hours as needed for pain   simvastatin (ZOCOR) 40 MG tablet   No No   Sig: Take 1 tablet (40 mg) by mouth At Bedtime      Facility-Administered Medications: None     Allergies   Allergies   Allergen Reactions     Naproxen GI Disturbance     Lisinopril Cough       Physical Exam   Vital Signs: Temp: 98.5  F (36.9  C) Temp src: Oral BP: (!) 108/35 Pulse: 114   Resp: (!) 33 SpO2: 96 % O2 Device: None (Room air)    Weight: 0 lbs 0 oz    Physical Exam:  General: uncomfortable appearing, frequently repositioning himself,  HEENT: normocephalic, atraumatic, no scleral icterus, EOMI, PERRL  Cardiovascular: tachycardic; no murmurs appreciated  Respiratory: clear to auscultation bilaterally; no wheezes or crackles; tachypneic  Abdomen: hypoactive bowel  sounds, soft, moderate tenderness to palpation in the RUQ and epigastric regions; no rebound tenderness or guarding  Extremities: +pedal and radial pulses bilaterally; cool; no lower extremity edema; delayed capillary refill   Skin: No concerning rashes or skin lesions seen  Neuro: alert & oriented x3, CNII-XII grossly intact and symmetric bilaterally; moving all 4 extremities spontaneously        Data   Data reviewed today: I reviewed all medications, new labs and imaging results over the last 24 hours.

## 2021-02-22 NOTE — PROGRESS NOTES
"Admitted/transferred from: ED at 0800  Reason for admission/transfer: pressors, vitally unstable, intubation  Patient status upon admission/transfer: vitally unstable, grunting, SOB,  intubated, lined, pressors increased  2 RN skin assessment: completed by Heavenly Bedolla"& Cindy JOLLY  Result of skin assessment and interventions/actions:  Height, weight, drug calc weight: Done  Patient belongings (see Flowsheet - Adult Profile for details):   MDRO education (if applicable):     "

## 2021-02-22 NOTE — ED PROVIDER NOTES
ED Course as of Feb 22 0749   Mon Feb 22, 2021   0747 VANESSA f/ Dr. Smith, pt has hx of hepatic mass, w/ hx of biliary obstruction, s/p stents, increase pain, being admitted for acute pancreatitis, being admitted on vanc and zosyn.         As I was coming onto shift I was called to bedside as patient was becoming increasingly hypoxic, patient was able to return to 94% w/ NC, however he appears critically ill, remains hypotensive, patient is on norepi, I feel patient requires a central line, dual pressor therapy, an arterial line, and likely intubation. However patient transport is about to take patient to the ICU, given I am currently the only physician in the ED with over 20 patients, I will defer the resuscitation and procedures he requires to the ICU, as I feel this will be in the best interest of him as well as the other ED patients, patient sent to the ICU.    MD Eden Lan Craig, MD  02/22/21 1012

## 2021-02-22 NOTE — CONSULTS
Advanced Endoscopy/Pancreaticobiliary Consultation      Date of Admission:  2/21/2021  Reason for Admission: Abdominal pain, post ERCP  Date of Consult  2/22/2021   Requesting Physician:  Montez Matt MD           ASSESSMENT AND RECOMMENDATIONS:   Assessment:  66 year old male with a history of left liver lobe mass c/f possible cholangiocarcinoma (FNA bx pending as of 2/19/21) s/p EUS and ERCP (2/19/2021) with biliary and pancreatic stent, HTN, HLD, presenting on 2/21 with sharp epigastric and RUQ pain, admitted for post-ERCP pancreatitis, severe sepsis, and FANG.     Recommendations:    Epigastric abdominal pain  Post-ERCP acute pancreatitis  Left liver lobe and biliary mass, s/p stent in biliary and pancreatic duct  Potential cholangiocarcinoma (bx pending as of 2/19/2021)  Had ERCP on 2/19/2021 which showed left liver lobe and biliary mass. Pending bx as of 2/19/21, but most likely cholangiocarcinoma rather than HCC. 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 Uncomplicated biliary sphincterotomy and placement of a 10 F stent across both the bifurcation and distal common duct malignant stenoses. On admission, CT abd./pelvis showed extensive peripancreatic inflammatory changes concerning for acute pancreatitis.  - Hold off on ERCP  - Continue aggressive ICU cares per primary team  - Fluid resuscitation: 250 ml/hr maintenance fluid (LR is fine)  - Strict urinary output (should ideally have 60-70 ml/hr with proper fluid resuscitation)  - Bladder pressure checks (for compartment syndrome)  - Please place NJ with dietician consult, as soon as possible. Earlier feeding will  "benefit the patient and his recovery  - Antibiotics, no need from pancreas/GI standpoint, but will defer to primary team (assume abx was started since pt clinically unstable)  - Analgesia/Antiemetics per primary team  - Trend LFTs, bilirubin    COVID status negative on 2/21/21  Discussed with Primary team (NP of MICU)    Thank you for involving us in this patient's care. Please do not hesitate to contact the GI service with any questions or concerns.     Pt seen and care plan discussed with Dr. Sanford, GI staff physician.    Blank Cox MD  PGY2    [phone]  -------------------------------------------------------------------------------------------------------------------       Reason for Consultation:   Post-ERCP Pancreatitis, worsening bili, LFTs         History of Present Illness:   Patient seen and examined at bedside. History is obtained from chart review. Pt is intubated, sedated.    66 year old male with a history of left liver lobe mass c/f possible cholangiocarcinoma (FNA bx pending as of 2/19/21) s/p EUS and ERCP (2/19/2021) with biliary and pancreatic stent, HTN, HLD, presenting on 2/21 with sharp epigastric and RUQ pain, admitted for post-ERCP pancreatitis, severe sepsis, and FANG.    He was being worked up for a new left lobe liver mass (discovered on lung cancer screening on CT), and a subsequent MRCP showed left haptic lobe mas causing obstruction of the left biliary system. At that point, he was referred to GI for an ERCP with stent placement and EUS/bx (done on 2/19 with Dr. 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 Uncomplicated biliary " "sphincterotomy and placement of a 10 F stent across both the bifurcation and distal common duct malignant stenoses.\" After the ERCP, he was discharged with plans of short course of abx, f/u labs, f/u ERCP in 1-3 weeks.    He was admitted on 2/21 because he reported worsening epigastric and RUQ pain since the ERCP on 2/19, with N/V, fever, chills. He also reported decreased urine output, but denied hematemesis, melena, hematochezia.    At the ED, he was tachycardic 100-110s, hypotensive to 80/40s, improved with total of 4.5 L IVF. Labs notable for Cr 3.63, tbili 10.3, alk phos 243, ALT 86, , lactate 4.3, lipase 4183, hgb 11.8, WBC 6.9. Negative for COVID. CT abd./pelvis showed extensive peripancreatic inflammatory changes concerning for acute pancreatitis, a hypo-attenuating lesion in the left hepatic lobe with diffuse biliary dilation, and pneumobilia.     Since he has gotten to the MICU floor, patient continues to be febrile Tmax 101F, tachycardic around 130s, with improved Aed027/70s (currently on mIVF at 250 ml/hr). He remains intubated and sedated, with recent CXR showing small left pleural effusion and possible infection. His labs are remarkable for the following: Cr 2.92, tbili 7.5, alk phos 133, ALT 76, , lactate 7.6, hgb 12.7, WBC 4.1.     Of note patient only got 4.5 L IVF at ED, and was not properly resuscitated until he got to the MICU floor in the morning. Thus, lactate still elevated.    Previous Procedures:  ERCP on 2/19/21            Past Medical History:   Reviewed and edited as appropriate  Past Medical History:   Diagnosis Date     Essential hypertension 11/07/2019     Hyperlipidemia LDL goal <130      Liver mass      Low back pain radiating to left leg     Started in April, 2013     Tobacco use disorder             Past Surgical History:   Reviewed and edited as appropriate   Past Surgical History:   Procedure Laterality Date     COLONOSCOPY  12/31/2020     ENDOSCOPIC RETROGRADE " CHOLANGIOPANCREATOGRAM N/A 2021    Procedure: ENDOSCOPIC RETROGRADE CHOLANGIOPANCREATOGRAPHY WITH BILIARY SPHINCTEROTOMY AND STENT PLACEMENT, PANCREATIC DUCT STENT PLACEMENT;  Surgeon: Frandy Sanford MD;  Location:  OR     ESOPHAGOSCOPY, GASTROSCOPY, DUODENOSCOPY (EGD), COMBINED N/A 2021    Procedure: ENDOSCOPIC ULTRASOUND WITH FINE NEEDLE BIOPSIES;  Surgeon: Frandy Sanford MD;  Location:  OR     NO HISTORY OF SURGERY                Social History:   Alcohol use 2 weeks ago    Unable to gather full social hx, given patient is intubated, sedated.           Family History:   Reviewed and edited as appropriate  Family History   Problem Relation Age of Onset     Cerebrovascular Disease Father          age 78     Gastrointestinal Disease Mother          age 79             Allergies:   Reviewed and edited as appropriate     Allergies   Allergen Reactions     Naproxen GI Disturbance     Lisinopril Cough            Medications:     Medications Prior to Admission   Medication Sig Dispense Refill Last Dose     Ascorbic Acid (VITAMIN C) 500 MG CAPS Take by mouth every morning        aspirin (ASA) 81 MG chewable tablet Take 81 mg by mouth every morning         calcium carbonate 750 MG CHEW daily as needed         ciprofloxacin (CIPRO) 500 MG tablet Take 1 tablet (500 mg) by mouth 2 times daily for 5 days 10 tablet 0      Glucosamine-Chondroit-Vit C-Mn (GLUCOSAMINE 1500 COMPLEX) CAPS Take by mouth every morning         losartan (COZAAR) 100 MG tablet Take 100 mg by mouth every morning         melatonin 1 MG CAPS At Bedtime         Omega-3 Fatty Acids 1200 MG capsule Take 1 capsule by mouth every morning  180 capsule 12      omeprazole (PRILOSEC) 20 MG DR capsule Take 20 mg by mouth every morning         oxyCODONE-acetaminophen (PERCOCET) 5-325 MG tablet Take 1 tablet by mouth every 4 hours as needed for pain 12 tablet 0      simvastatin (ZOCOR) 40 MG tablet Take 1 tablet (40 mg) by mouth At  Bedtime 90 tablet 3              Review of Systems:     A complete review of systems was performed and is negative except as noted in the HPI.            Physical Exam:   Temp: 101  F (38.3  C) Temp src: Axillary BP: 104/65 Pulse: 111   Resp: 28 SpO2: 100 % O2 Device: Mechanical Ventilator    Wt:   Wt Readings from Last 2 Encounters:   02/22/21 78.4 kg (172 lb 13.5 oz)   02/19/21 68.8 kg (151 lb 10.8 oz)        General: male intubated, sedated. Unable to gather history.   HEENT: Head is AT/NC. Sclera anicteric.   Neck: No masses or thyromegaly.  Lungs: Intubated.  Heart: Regular rate and rhythm.  No murmurs, gallops or rubs.  Normal S1 and S2.  Abdomen: Soft, non-tender, distended.   Extremities: WWP, no pedal edema.  Heme/Lymph: No cervical or supraclavicular adenopathy.   Skin: no jaundice  Neurologic: unable to do, sedated.           Data:   Labs and imaging below were independently reviewed and interpreted    LAB WORK:    BMP  Recent Labs   Lab 02/22/21  0920 02/22/21  0552 02/22/21  0421 02/22/21  0349 02/21/21  1855 02/21/21  1855 02/19/21  0747    136  --  Canceled, Test credited  --  128*  --    POTASSIUM 4.4 4.4 4.0 Canceled, Test credited   < > 4.6 3.9   CHLORIDE 110* 107  --   --   --  98  --    JUWAN PENDING 6.2*  --   --   --  6.5*  --    CO2 12* 14*  --   --   --  20  --    BUN 45* 42*  --   --   --  45*  --    CR 2.92* 2.65*  --   --   --  3.63* 1.00   * 79  --   --   --  110*  --     < > = values in this interval not displayed.     CBC  Recent Labs   Lab 02/22/21  0552 02/21/21  1855   WBC 4.1 6.9   RBC 4.02* 3.78*   HGB 12.7* 11.8*   HCT 38.7* 34.4*   MCV 96 91   MCH 31.6 31.2   MCHC 32.8 34.3   RDW 15.1* 14.6    339     INR  Recent Labs   Lab 02/22/21  0552   INR 1.41*     LFTs  Recent Labs   Lab 02/22/21  0920 02/22/21  0552 02/21/21  1855   ALKPHOS 133 161* 243*   * 139* 112*   ALT 76* 80* 86*   BILITOTAL 7.5* 8.6* 10.3*   PROTTOTAL 4.7* 5.2* 6.1*   ALBUMIN 1.4* 1.7* 2.2*       PANC  Recent Labs   Lab 02/21/21  1855 02/19/21  1305   LIPASE 4,183* 1,074*   AMYLASE  --  89       IMAGING:  ERCP 2/19/2021  Impression:          - Mod 22; difficult cannulation of the bile duct due to                        a distal malignant stenosis prompting initial                        cannulation of the ventral pancreatic duct                        - Grossly normal main ventral dominant main duct with                        downsream ansa loop which was difficult to deeply                        cannulate leading to small wire perforation of a side                        branch before placement of a 3F pancreatic stent across                        the loop to the body                        - Bismuth IIIL malginant stenosis with impressive right                        intrahepatic dilation; attempts to cannulate the                        atrophic left across the malignancy were limited to                        avoid contamination of these poorly draining ducts                        - Uncomplicated biliary sphincterotomy and placement of                        a 10F stent across both the bifurcation and distal                        common duct malignant stenoses     EUS 2/19/2021  Impression:          - Multiple concerning lesions were demonstrated                        including a large irregular mass involving the left lobe                        (sampled, thought to be malignanct), a mass (perhaps                        direct extensio) involving the bifurcation and and                        irregular wall thickening of the distal common                        - Impressive left greater than right intrahepatic                        dilation was appreciated                        - Concerning periportal nodes (sampled, thought to be                        benign) without other foci of abdominal lymphadenopathy                        - Unremarkable gallbladder                        - Grossly  unremarkable pancreas with thin ventral                        dominant main duct                        - Traditional major abdominal vasculature not involved                        with the lesions                        - These findings in combination are concerning for                        cholangiocarcinoma and less likely some hepatocellular                        carcinoma   =======================================================================

## 2021-02-22 NOTE — ED TRIAGE NOTES
Patient comes in from home with daughter. Per daughter, patient had ERCP Friday 2/19 (took biopsy, hx tumor on bile duct). Patient endorsing abdominal pain and nausea. Has not had BM since Wednesday 2/17 and has not urinated since 0300 2/21. Vomited 2X 2/20.

## 2021-02-22 NOTE — PROCEDURES
PROCEDURE:   Ultrasound guided Right Femoral artery line placement.     INDICATION: Blood pressure monitoring, shock    PROCEDURE :   Jacque Sandoval MD    SUPERVISOR:  Dr. Matt    CONSENT: Emergent.    UNIVERSAL PROTOCOL: Patient Identification was verified, time out was performed, site marking was done. Imaging data reviewed. Full Barrier precaution done: Hands washed, mask, gloves, gown, cap and eye protection all used.     PROCEDURE SUMMARY:   The patient was prepped and draped in the usual sterile manner using chlorhexidine scrub. 1% lidocaine was used to numb the region.     The right femoral artery was palpated and visualized on ultrasound.  The artery was successfully cannulated on the first pass. Pulsatile, arterial blood was visualized and the artery was then threaded with a guide wire using the Seldinger technique.  The needle was removed and ultrasound visualized the wire in the artery, a catheter was threaded over the wire, the wire was removed, and the catheter was sutured into place. A good wave-form was obtained. The patient tolerated the procedure well without any immediate complications. The area was cleaned and a dressing was applied.     Dr Matt was available for the key portions of the procedure.    ESTIMATED BLOOD LOSS: Minimal    Jacque Sandoval MD  Critical Care Fellow    Present for the proceure    Montez Matt MD

## 2021-02-22 NOTE — H&P
MEDICAL ICU H&P  02/22/2021    Date of Hospital Admission: 2/21  Date of ICU Admission: 2/22  Reason for Critical Care Admission:   Date of Service (when I saw the patient): 02/22/2021    ASSESSMENT: Michelle Alvarado is a 66 year old male with PMH of left liver lobe mass c/f cholangiocarcinoma s/p EUS and ERCP (2/19/2021), HTN, and HLD who presented to the ED on 2/21 with sharp epigastric and RUQ pain and N/V since 2/19, and was admitted for post-ERCP pancreatitis, severe sepsis, and significant acute kidney injury.      CHANGES and MAJOR THINGS TODAY:   Echo  Bicarb gtt  CRRT  Broad spectrum antibiotics      PLAN:    Neuro:  # Acute Pain and sedation needs  - Fentanyl with PRN boluses  - Precedex   - RASS goal -2 to -3. Versed gtt if unable to obtain RASS goal with dex/fentanyl   - PRN versed   - PRN acetaminophen    Pulmonary:  # Acute hypoxic respiratory failure. Pt presented on room air.  # Volume overload/pulm edema vs. ARDS in setting of pancreatitis. Failed bipap, intubated for hypoxia on 2/22  # Tobacco use disorder, 50 year smoking history   #pulmonary nodules. CT 2/11 shows Calcified granuloma in the  left upper lobe.  2 mm left upper lobe benign appearing solitary pulmonary nodule is unchanged since at least 11/8/2019. 2 mm nodule in the posterior left lower lobe is new since 11/13/2020. Not on supplemental 02 at home   - AC/VC 28/420/8 50%  - Vt at ~ 8ml/kg in attempt to drive down co2 in setting of acidosis. Will plan to decrease to goal of 6ml/kg once acidosis is corrected ( expect improvement with initiation of CRRT)   - q 4 H duonebs   - ABG q 4 H   - Can consider veletri if oxygenation worsening     Cardiovascular:  # Shock, distributive, presumed septic. Received 4L of crystalloid resuscitation over last 16H in ED   # CAD  # History of hypertension  # History of hyperlipidemia  # Stress cardiomyopathy troponin .629  - LR bolus 1 L   - bicarb gtt pending CRRT initiation   - Levo and vasopressin to  keep MAP > 65  - dobutamine gtt at 5mcg/kg/min   - Stress dose steroids   - echo to evaluate for cardiac function, volume status  - Trops elevated, trend q 6 H   - trend VBG to monitor SVO2   - consider flotrack monitoring   - Hold home statin in setting of elevated transaminases   - Hold daily asa in setting of FANG   - hold home losartan    GI/Nutrition:  # Acute pancreatitis   # nausea/vomiting  # suspicion for cholangiocarcinoma  # hyperbilirubinemia T bili 10.3-> 8.6  # concern for cholangitis vs. Biliary obstruction, vs. Intra-abdominal infection in setting of recent ERCP   # Constipation. No BM X 4 days PTA, had a Bm in ED  #Concern for increased intraabdominal pressures  #GERD without esophagitis   # history of H pylori gastritis 2015  - GI consulted, recommend conservative management with aggressive fluid resuscitation   - NPO for now,  Place post-pyloric feeding tube placement ok per GI   - Trend lactate q 4  - abx per ID  - Abd pressure monitoring. Initial pressure 12 following paralytic dose for intubation   - bowel regimen with miralax, senna, colace   # hepatitis A, hep B core yann positive, surf yann negative 12/20 hep negative  12/14/20      Renal/Fluids/Electrolytes:  # hyponatremia Na 128 on presentation, resolved Na 134  # oliguric FANG, suspect prerenal, now evolving to ATN in setting of shock Significant interval worsening of his renal function (Cr 0.96 on 2/12, Cr 3.63, BUN 45 on presentation), with reported anuria-oliguria. Likely pre-renal vs ATN in the setting of severe sepsis, pancreatitis.  - min in place with bladder pressure monitoring q 8 H   - trend UOP.   - consider renal consult anticipate  need for RRT if worsening      #Metabolic acidosis, secondary to lactic acidosis, ARF s/p 1 amp bicarb   - Follow ABG q 4 H  - hold bicarb gtt once CRRT initiated   - daily thiamine for type b/mixed lactic acidosis     # hypomagnesemia mag 1.5  # hypocalcemia   - 2gm mg, 2 lazarus glu replace and  recheck today   - trending lytes q 6 H for now  - electrolyte replacement PRN       Endocrine:  # Prediabetes, Hgb Aic 6.3 on nov. 2020, 5.8 this admission not on PTA meds   - BG check q 4H, sliding scale insulin     ID:  # Concern for sepsis  - follow blood, urine cultures 2/21  - send sputum culture  - repeat blood cultures tomorrow   - Vanco and zosyn started 2/21, expand to vanco, merrem and fluconazole       Hematology:    # Normocytic anemia Hgb 11.8 on presentation. Baseline ~13-14.  - Monitor CBC    #Coagulopathy INR 1.41 not on AC prior, suspect elevated in setting of acute liver injury  - Check fibrinogen      Musculoskeletal:  # chronic hip/back pain  #history of avascular L necrosis of hip  - surgery recommended but not pursued       Skin:  # No acute issues     General Cares/Prophylaxis:    DVT Prophylaxis: Pneumatic Compression Devices, heparin subcutaneous   GI Prophylaxis: PPI  Restraints: mitts   Family Communication: daughter is contact, updated per phone   Code Status: FULL       Lines/tubes/drains:  - 2 PIV  - Left internal jugular central line  - right femoral arterial line     Disposition:  - Medical ICU     Patient seen and findings/plan discussed with medical ICU staff, Dr. Matt.    Rut Mcmillan    -----------------------------------------------------------------------    HISTORY PRESENTING ILLNESS:   Michelle Alvarado is a 66 year old male with PMH of left liver lobe mass c/f cholangiocarcinoma s/p EUS and ERCP (2/19/2021), HTN, and HLD who presented to the ED on 2/21 with sharp epigastric and RUQ pain, new since his EUS/ERCP.     He was being worked up for a left lobe liver mass (discovered nov 2020 on a lung cancer screening CT), concerning for intra-hepatic cholangiocarcinoma. Subsequent MRI imaging revealed a left hepatic lobe mass causing obstruction of the left biliary system. He was referred to interventional GI for an ERCP w/ stent placement and EUS, done on 2/19, and notable for  challenging cannulation. He was discharged with plans for a short course of cipro and follow-up labs and ERCP over the next 1-3 weeks.     He reports worsening epigastric and RUQ pain since the procedure on 2/19, with nausea, vomiting, fevers, and chills. He has also had decreased urine output, urinating for the first in several days while in the ED. He has also been experiencing significant constipation. Denies hematemesis, melena, or hematochezia. He endorses a smoking history, alcohol use (last ~2 weeks ago).     Vitally, he was afebrile, tachycardic to the 100s-110s. He was initially hypotensive to the 80s/40s on presentation, which improved after IVF resuscitation, but has been fluctuating since then. He was requiring 5L NC when seen in the ED. Labs were notable for Na 128, BUN 45, Cr 3.63, Ca 6.5, TBili 10.3, Alk Phos 243, ALT 86, , Lactate 4.3 (3.2 on repeat), Lipase 4183, Procal 39.74, Hgb 11.8, WBC 6.9. Blood cultures are pending. COVID, Influenza A/B, and RSV were negative. CT Abdomen/Pelvis showed extensive peripancreatic inflammatory changes concerning for acute pancreatitis, a hypoattenuating lesion in the left hepatic lobe with diffuse biliary dilatation, and pneumobilia.     GI was consulted and recommended NPO, blood cultures, broad spectrum antibiotics, and aggressive IVF resuscitation.. He was given Vancomycin, Zosyn, 3L IVF bolus, started on a mIVF, and admitted to medicine for further work-up and management.    REVIEW OF SYSTEMS: NEGAR secondary to intubation/sedation     PAST MEDICAL HISTORY:   Past Medical History:   Diagnosis Date     Essential hypertension 11/07/2019     Hyperlipidemia LDL goal <130      Liver mass      Low back pain radiating to left leg     Started in April, 2013     Tobacco use disorder      SURGICAL HISTORY:  Past Surgical History:   Procedure Laterality Date     COLONOSCOPY  12/31/2020     ENDOSCOPIC RETROGRADE CHOLANGIOPANCREATOGRAM N/A 2/19/2021    Procedure:  ENDOSCOPIC RETROGRADE CHOLANGIOPANCREATOGRAPHY WITH BILIARY SPHINCTEROTOMY AND STENT PLACEMENT, PANCREATIC DUCT STENT PLACEMENT;  Surgeon: Frandy Sanford MD;  Location:  OR     ESOPHAGOSCOPY, GASTROSCOPY, DUODENOSCOPY (EGD), COMBINED N/A 2/19/2021    Procedure: ENDOSCOPIC ULTRASOUND WITH FINE NEEDLE BIOPSIES;  Surgeon: Frandy Sanford MD;  Location: [facility] OR     NO HISTORY OF SURGERY       SOCIAL HISTORY:  Social History     Socioeconomic History     Marital status:      Spouse name: None     Number of children: 3     Years of education: None     Highest education level: None   Occupational History     Employer: [employer]   Social Needs     Financial resource strain: None     Food insecurity     Worry: None     Inability: None     Transportation needs     Medical: None     Non-medical: None   Tobacco Use     Smoking status: Former Smoker     Packs/day: 1.00     Types: Cigarettes     Smokeless tobacco: Never Used     Tobacco comment: Reports quitting smoking two years ago   Substance and Sexual Activity     Alcohol use: Not Currently     Alcohol/week: 10.0 standard drinks     Comment: Last drink 12/1/2020     Drug use: No     Sexual activity: Not Currently   Lifestyle     Physical activity     Days per week: None     Minutes per session: None     Stress: None   Relationships     Social connections     Talks on phone: None     Gets together: None     Attends Zoroastrian service: None     Active member of club or organization: None     Attends meetings of clubs or organizations: None     Relationship status: None     Intimate partner violence     Fear of current or ex partner: None     Emotionally abused: None     Physically abused: None     Forced sexual activity: None   Other Topics Concern     Parent/sibling w/ CABG, MI or angioplasty before 65F 55M? Not Asked   Social History Narrative    Native of [place], in [place] since 1995     FAMILY HISTORY:   Family History   Problem Relation Age of Onset      Cerebrovascular Disease Father          age 78     Gastrointestinal Disease Mother          age 79     ALLERGIES:   Allergies   Allergen Reactions     Naproxen GI Disturbance     Lisinopril Cough     MEDICATIONS:  No current facility-administered medications on file prior to encounter.   Ascorbic Acid (VITAMIN C) 500 MG CAPS, Take by mouth every morning  aspirin (ASA) 81 MG chewable tablet, Take 81 mg by mouth every morning   calcium carbonate 750 MG CHEW, daily as needed   ciprofloxacin (CIPRO) 500 MG tablet, Take 1 tablet (500 mg) by mouth 2 times daily for 5 days  Glucosamine-Chondroit-Vit C-Mn (GLUCOSAMINE 1500 COMPLEX) CAPS, Take by mouth every morning   losartan (COZAAR) 100 MG tablet, Take 100 mg by mouth every morning   melatonin 1 MG CAPS, At Bedtime   Omega-3 Fatty Acids 1200 MG capsule, Take 1 capsule by mouth every morning   omeprazole (PRILOSEC) 20 MG DR capsule, Take 20 mg by mouth every morning   oxyCODONE-acetaminophen (PERCOCET) 5-325 MG tablet, Take 1 tablet by mouth every 4 hours as needed for pain  simvastatin (ZOCOR) 40 MG tablet, Take 1 tablet (40 mg) by mouth At Bedtime        PHYSICAL EXAMINATION:  Temp:  [98.5  F (36.9  C)-101  F (38.3  C)] 101  F (38.3  C)  Pulse:  [] 130  Resp:  [10-46] 28  BP: ()/() 104/65  MAP:  [47 mmHg-112 mmHg] 112 mmHg  Arterial Line BP: ()/(38-77) 194/77  FiO2 (%):  [40 %-100 %] 40 %  SpO2:  [85 %-100 %] 99 %     General: Intubated, sedated, lying in bed, NAD  HEENT: Normocephalic, atraumatic, neck supple no lymphadenopathy   Neuro: purposefully movement X 4, PERRL   Pulm/Resp: Clear breath sounds bilaterally without rhonchi, crackles or wheeze, no accessory muscle use   CV: RRR, no m/r/g. DP and radial pulses 1+ palpable   Abdomen: Rounded, semi-firm, guarding, hypoactive BS  : min catheter in place, urine dark yellow and without sediment   Incisions/Skin: Mottling noted bilateral LE to thighs.     LABS: Reviewed.   Arterial  Blood Gases   Recent Labs   Lab 02/22/21  0920   PH 7.23*   PCO2 25*   PO2 113*   HCO3 11*     Complete Blood Count   Recent Labs   Lab 02/22/21  0552 02/21/21  1855   WBC 4.1 6.9   HGB 12.7* 11.8*    339     Basic Metabolic Panel  Recent Labs   Lab 02/22/21  0920 02/22/21  0552 02/22/21  0421 02/22/21  0349 02/21/21  1855 02/21/21  1855 02/19/21  0747    136  --  Canceled, Test credited  --  128*  --    POTASSIUM 4.4 4.4 4.0 Canceled, Test credited   < > 4.6 3.9   CHLORIDE 110* 107  --   --   --  98  --    CO2 12* 14*  --   --   --  20  --    BUN 45* 42*  --   --   --  45*  --    CR 2.92* 2.65*  --   --   --  3.63* 1.00   * 79  --   --   --  110*  --     < > = values in this interval not displayed.     Liver Function Tests  Recent Labs   Lab 02/22/21  0920 02/22/21  0552 02/21/21  1855   * 139* 112*   ALT 76* 80* 86*   ALKPHOS 133 161* 243*   BILITOTAL 7.5* 8.6* 10.3*   ALBUMIN 1.4* 1.7* 2.2*   INR  --  1.41*  --      Coagulation Profile  Recent Labs   Lab 02/22/21  0552   INR 1.41*   PTT 41*       IMAGING:  Recent Results (from the past 24 hour(s))   CT Abdomen Pelvis w/o Contrast    Narrative    EXAMINATION: CT ABDOMEN PELVIS W/O CONTRAST  2/21/2021 8:26 PM      CLINICAL HISTORY: RUQ pain and chills after ERCP and stenting.  Cholangiocarcinoma    COMPARISON: Abdominal MR 2/11/2021 and CT chest 2/11/2021        PROCEDURE COMMENTS: CT of the abdomen and pelvis was obtained without  contrast. Coronal and sagittal formatted images were obtained.     FINDINGS:    Lower Thorax:  Small left pleural effusion with associated compressive atelectasis.  Trace right pleural effusion. Bibasilar opacities.    Abdomen/pelvis:  3.0 x 2.8 cm hypoattenuating lesion within the left hepatic lobe with  diffuse intrahepatic biliary dilatation throughout the left hepatic  lobe. Pneumobilia with common bile duct stent extending into the  intrahepatic biliary ducts. Pancreatic duct stent also in place. There  is  extensive fat stranding about the pancreas and mesentery without  focal fluid collection. Small amount of retained contrast in the  gallbladder. The spleen, adrenals, and and kidneys are normal in  appearance. Stable simple right renal cysts. Urinary bladder appears  unremarkable. Few prominent fluid-filled loops of small bowel.  Moderate colonic stool burden with fecalization of distal small bowel  loops. There is wall thickening of the colon as it courses near the  pancreas, likely reactive. The appendix is normal. No free  intraperitoneal air. Small amount of free fluid within the pelvis. The  infrarenal aorta is of normal caliber. Aortoiliac atherosclerotic  calcifications. Multiple prominent and borderline enlarged mesenteric  lymph nodes. Enlarged elizabeth hepatis lymph node measuring up to 12 mm.     Bones:   Multilevel degenerative changes of the spine. 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 Ankylosis of bilateral  sacroiliac joints. No suspicious or aggressive appearing bone lesions.      Impression    IMPRESSION:  1. Extensive peripancreatic inflammatory changes concerning for acute  pancreatitis.  2. 3.0 x 2.8 cm hypoattenuating lesion within the left hepatic lobe  with diffuse hepatic biliary dilatation in the left hepatic lobe,  better characterized on prior MR from 2/11/2021.  3. Pneumobilia, likely related to recent ERCP with biliary stent and  pancreatic duct stents in place.  4. Small left and trace right pleural effusions with overlying  atelectasis and consolidation.    I have personally reviewed the examination and initial interpretation  and I agree with the findings.    RANDALL MCNAIR MD   XR Abdomen 1 View    Narrative    Exam: XR ABDOMEN 1 VW, 2/22/2021 10:45 AM    Indication: OG placement    Comparison: CT abdomen and pelvis 2/21/2021    Findings:   Portable supine abdominal radiograph. OG tube tip in the region of the  descending duodenum with side hole towards  the pylorus. Plastic  biliary and pancreatic stents in place. Scattered gas-filled loops of  bowel demonstrated some of which appear to be small bowel loops,  measuring up to 4 cm, nonspecific. There is gas present to the  rectosigmoid colon. Pattern was nonobstructed on CT. Degenerative  changes. Limited assessment for free air on supine imaging.      Impression    Impression: OG tube tip postpyloric in the proximal duodenum. Biliary  and pancreatic stents remain in place.    ANDERSON GUTIERREZ MD   XR Chest Port 1 View    Impression    IMPRESSION:  1. Endotracheal tube with tip overlying the mid thoracic trachea  approximately 4 cm cephalad to the altagracia.  2. Left IJ central venous catheter with tip overlying the mid SVC. No  pneumothorax.  3. Mixed interstitial and airspace opacities at the lung bases with  small left pleural effusion. Findings possibly representing atypical  infection versus pulmonary edema.

## 2021-02-22 NOTE — PROGRESS NOTES
"CLINICAL NUTRITION SERVICES - ASSESSMENT NOTE     Nutrition Prescription    RECOMMENDATIONS FOR MDs/PROVIDERS TO ORDER:  If small bowel FT placement is desired, would order this via radiology so that fluoro can be used. This can be ordered as \"XR feeding tube placement.\"    If pt has post pyloric FT placed by radiologist, and is hemodynamically stabilized, would initiate pancreatitis-friendly TF regimen, one that is semi-elemental and fiber-free. Recommend Peptamen 1.5 @ 10 mL/hr and adv by 10 mL q8h until @ goal rate of 50 ml/hr to provide 1800 kcals (30 kcal/kg/day), 82 g PRO (1.4 g/kg/day), 924 mL H2O, 67 g Fat (70% from MCTs), 226 g CHO and no Fiber daily.  With this regimen, also order 1 pkt Prosource daily to bring total provisions to 1840 kcal (31 kcal/kg) and 93 g PRO (1.6 g/kg) daily.    Aggressive bowel regimen once hemodynamically stable.    Malnutrition Status:    Pt does not meet two of the established criteria necessary for diagnosing malnutrition but is at risk for malnutrition.    Recommendations already ordered by Registered Dietitian (RD):  Discussed FT placement with NP and RN at length. Use of Cortrak is not indicated in GI cancer, which this pt likely has, and based on where the GB (common bile duct) interacts with the duodenum. Cortrak is essentially a \"blind\" FT placement so it is not indicated in this case. Clinically worsening so TF will not be started today.       REASON FOR ASSESSMENT  Michelle Alvarado is a 66 year old male assessed by the dietitian for FT placement and Provider Order - Registered Dietitian to Assess and Order TF per Medical Nutrition Therapy Protocol    NUTRITION HISTORY  Unable to interview d/t intubation and sedation. Pt with post procedure pancreatitis (ERCP yesterday 2/21). FT (jejunally placed, if possible, given pancreatitis) was recommended by GI team. Per H&P, \"He reports worsening epigastric and RUQ pain since the procedure on 2/19, with nausea, vomiting, fevers, " "and chills.\" Pt's ROS in ED states pt had endorsed a change in his appetite, but per H&P this may have only been since 2/19 with RUQ pain following ERCP. No BM since 2/17.    CURRENT NUTRITION ORDERS  Diet: NPO  Intake/Tolerance: likely inadequate intake for 3 days PTA given sx    LABS  Labs reviewed-  (2/22). Lactate is climbing, up to 8.4 now.   HgbA1c 5.8% (2/22)  Negative urinary ketones (2/22)    MEDICATIONS  Medications reviewed- thiamin    ANTHROPOMETRICS  Height: 160 cm  Most Recent Weight: 78.4 kg (172 lb 13.5 oz)    IBW: 56.4 kg  BMI: Obesity Grade I BMI 30-34.9  Weight History:   Wt Readings from Last 10 Encounters:   02/22/21 78.4 kg (172 lb 13.5 oz)   02/19/21 68.8 kg (151 lb 10.8 oz)   02/12/21 70.5 kg (155 lb 6.4 oz)   01/06/21 73.5 kg (162 lb)   11/24/14 66.7 kg (147 lb)   06/17/14 66.2 kg (146 lb)   06/02/14 65.3 kg (144 lb)   05/20/14 67.7 kg (149 lb 3.2 oz)   05/06/14 68 kg (150 lb)   11/22/13 64.4 kg (142 lb)     Pt volume resuscitated this AM. Recent wt of 71.2 kg from 2/2/21    Dosing Weight: 60 kg (adj wt using dry wt of 71.2 kg)    ASSESSED NUTRITION NEEDS  Estimated Energy Needs: 8925-3623+ kcals/day (25 - 30+ kcals/kg)  Justification: Maintenance  Estimated Protein Needs:  g/day (1.5 - 2 g/kg)  Justification: pancreatitis, Hypercatabolism with critical illness  Estimated Fluid Needs: Per provider pending fluid status    PHYSICAL FINDINGS  See malnutrition section below.  Abd semi-firm  Mottling noted bilateral LE to thighs (per [facility] note)     MALNUTRITION  % Intake: Decreased intake does not meet criteria  % Weight Loss: None noted  Subcutaneous Fat Loss: None observed  Muscle Loss: Scapular bone, Thoracic region (clavicle, acromium bone, deltoid, trapezius, pectoral), Upper arm (bicep, tricep) and Posterior calf: mild  Fluid Accumulation/Edema: None noted  Malnutrition Diagnosis: Pt does not meet two of the established criteria necessary for diagnosing malnutrition but is at " risk for malnutrition    NUTRITION DIAGNOSIS  Inadequate protein-energy intake related to resp status inhibiting ability to take PO, decreased PO intake PTA as evidenced by pt with decreased appetite x 3 days PTA given N/V and abd pain, constipation (no BM x 5 days), decreased UOP PTA, and mild muscle wasting.      INTERVENTIONS  Implementation  Nutrition Education: Not appropriate at this time due to patient condition   Collaboration with other providers - RN, [facility].    Goals  Total avg nutritional intake to meet a minimum of 25 kcal/kg and 1.5 g PRO/kg daily (per dosing wt 60 kg).     Monitoring/Evaluation  Progress toward goals will be monitored and evaluated per protocol.    Sarah West, RD, LD  ([facility] dietitian, [phone] (Mon-Fri))

## 2021-02-22 NOTE — PROGRESS NOTES
Brief GI note:    67yo [place]an speaking gentleman w/ recent EUS/ERCP 2/19 (challenging procedure, with difficulty draining left intrahepatics), concern for cholangiocarcinoma. Returning with worsening epigastric pain. Found to have post-ERCP pancreatitis (classic pain, lipase 4K and CT findings c/w acute pancreatitis, no abscess) with new FANG and lactic acidosis. Of note, rising TBili (5.1-->10.3) concerning for persistent inadequate drainage, ?cholangitis.     Plan:  --Aggressive fluid resuscitation (2L and then 250ml/hr of LR overnight)  --NPO  --BCx then broad spectrum abx   --GI team will see in AM to discuss timing of repeat ERCP     Discussed with Dr. Juvenal Kc MD, CHQ  Gastroenterology Fellow, PGY5  Pager [phone]

## 2021-02-22 NOTE — PROCEDURES
[facility]    Dialysis Catheter    Date/Time: 2/22/2021 3:43 PM  Performed by: Elizabeth Saleh NP  Authorized by: Elizabeth Saleh NP   Indications: vascular access    UNIVERSAL PROTOCOL   Site Marked: Yes  Prior Images Obtained and Reviewed:  NA  Required items: Required blood products, implants, devices and special equipment available    Patient identity confirmed:  Arm band  NA - No sedation, light sedation, or local anesthesia  Confirmation Checklist:  Patient's identity using two indicators, relevant allergies and procedure was appropriate and matched the consent or emergent situation  Time out: Immediately prior to the procedure a time out was called    Universal Protocol: the Joint Commission Universal Protocol was followed    Preparation: Patient was prepped and draped in usual sterile fashion           ANESTHESIA    Anesthesia: Local infiltration  Local Anesthetic:  Lidocaine 2% without epinephrine  Anesthetic Total (mL):  3      SEDATION    Patient Sedated: Yes    Sedation:  Fentanyl and see MAR for details  Vital signs: Vital signs monitored during sedation      Preparation: skin prepped with 2% chlorhexidine  Skin prep agent dried: skin prep agent completely dried prior to procedure  Sterile barriers: all five maximum sterile barriers used - cap, mask, sterile gown, sterile gloves, and large sterile sheet  Hand hygiene: hand hygiene performed prior to central venous catheter insertion  Patient position: flat  Catheter type: double lumen  Pre-procedure: landmarks identified  Ultrasound guidance: yes  Sterile ultrasound techniques: sterile gel and sterile probe covers were used  Number of attempts: 1  Successful placement: yes  Post-procedure: line sutured and dressing applied  Assessment: blood return through all ports,  free fluid flow,  placement verified by x-ray and no pneumothorax on x-ray    PROCEDURE Describe Procedure: Patient tolerated  procedure well.   Length of time physician/provider present for 1:1 monitoring during sedation: 0      Staff addendum: I was available for the procedure.

## 2021-02-22 NOTE — PHARMACY-VANCOMYCIN DOSING SERVICE
New consult for pharmacy to dose vancomycin received for indication of sepsis. Patient already receiving vancomycin for indication of intra-abdominal infection. Vancomycin already being given for high goal range of 15-20. Continue current dosing.

## 2021-02-22 NOTE — CONSULTS
Nephrology Initial Consult  February 22, 2021      Michelle Alvarado MRN:[id number] YOB: 1955  Date of Admission:2/21/2021  Primary care provider: Olvin Alvarado  Requesting physician: Montez Matt MD    ASSESSMENT AND RECOMMENDATIONS:   Michelle Alvarado is a 66 year old male with PMH of left liver lobe mass c/f cholangiocarcinoma s/p EUS and ERCP (2/19/2021), HTN, and HLD admitted for post ERCP pancreatitis and now with septic shock. Nephrology consulted for anuric FANG and metabolic acidosis.    # Oliguric FANG  Cr 2.88, up from baseline ~0.8-0.9. FANG secondary to likely ATN in setting of septic shock. Rising lactate and falling UOP. Will initiate CRRT for renal support.   - Start CRRT   - 25ml/kg/hr, 4K, Is=Os  - Consent obtained from patient's daughter, in person  - Follow Is/Os  - Daily weights    # Anion Gap Metabolic Acidosis  # Respiratory acidosis  Bicarb 11 on most recent VBG, lactate 8.4. AG ~20 when correcting for albumin. Additionally, has mild respiratory acidosis. Would expect pCO2 to be ~25 by Winter's formula, and currently is 31. Dialysis will deliver bicarbonate load which will improve pH, however the treatment for the lactic acidosis is treating underlying infection and managing sepsis.   - CRRT as above  - Vent, sepsis management per ICU team    Recommendations were communicated to primary team in person    Seen and discussed with Dr. Pablo Juan MD   [phone]      REASON FOR CONSULT: FANG, acidosis    HISTORY OF PRESENT ILLNESS:  Admitting provider and nursing notes reviewed.  Michelle Alvarado is a 66 year old male with PMH of left liver lobe mass c/f cholangiocarcinoma s/p EUS and ERCP (2/19/2021), HTN, and HLD who presented to the ED on 2/21 with sharp epigastric and RUQ pain, and was admitted for post-ERCP pancreatitis, severe sepsis, and significant acute kidney injury. Nephrology consulted for oliguric/anuric FANG and worsening acidosis. The patient previously  had normal renal function with baseline Cr 0.8-0.9. He had 120ml UOP with Ramirez insertion this morning, but since has had less than 10ml/hr.     PAST MEDICAL HISTORY:  Reviewed with patient on 02/22/2021   Past Medical History:   Diagnosis Date     Essential hypertension 11/07/2019     Hyperlipidemia LDL goal <130      Liver mass      Low back pain radiating to left leg     Started in April, 2013     Tobacco use disorder        Past Surgical History:   Procedure Laterality Date     COLONOSCOPY  12/31/2020     ENDOSCOPIC RETROGRADE CHOLANGIOPANCREATOGRAM N/A 2/19/2021    Procedure: ENDOSCOPIC RETROGRADE CHOLANGIOPANCREATOGRAPHY WITH BILIARY SPHINCTEROTOMY AND STENT PLACEMENT, PANCREATIC DUCT STENT PLACEMENT;  Surgeon: Frandy Sanford MD;  Location: [facility] OR     ESOPHAGOSCOPY, GASTROSCOPY, DUODENOSCOPY (EGD), COMBINED N/A 2/19/2021    Procedure: ENDOSCOPIC ULTRASOUND WITH FINE NEEDLE BIOPSIES;  Surgeon: Frandy Sanford MD;  Location: [facility] OR     NO HISTORY OF SURGERY          MEDICATIONS:  PTA Meds  Prior to Admission medications    Medication Sig Last Dose Taking? Auth Provider   Ascorbic Acid (VITAMIN C) 500 MG CAPS Take by mouth every morning   Reported, Patient   aspirin (ASA) 81 MG chewable tablet Take 81 mg by mouth every morning    Reported, Patient   calcium carbonate 750 MG CHEW daily as needed    Reported, Patient   ciprofloxacin (CIPRO) 500 MG tablet Take 1 tablet (500 mg) by mouth 2 times daily for 5 days   Frandy Sanford MD   Glucosamine-Chondroit-Vit C-Mn (GLUCOSAMINE 1500 COMPLEX) CAPS Take by mouth every morning    Kenji Chaudhry MD   losartan (COZAAR) 100 MG tablet Take 100 mg by mouth every morning    Reported, Patient   melatonin 1 MG CAPS At Bedtime    Reported, Patient   Omega-3 Fatty Acids 1200 MG capsule Take 1 capsule by mouth every morning    Kenji Chaudhry MD   omeprazole (PRILOSEC) 20 MG DR capsule Take 20 mg by mouth every morning    Reported, Patient    oxyCODONE-acetaminophen (PERCOCET) 5-325 MG tablet Take 1 tablet by mouth every 4 hours as needed for pain   Frandy Sanford MD   simvastatin (ZOCOR) 40 MG tablet Take 1 tablet (40 mg) by mouth At Bedtime   Kenji Chaudhry MD      Current Meds    [Held by provider] aspirin  81 mg Oral QAM     docusate  100 mg Oral or Feeding Tube Daily     fluconazole  200 mg Intravenous Q24H     fludrocortisone  50 mcg Oral or Feeding Tube Daily     heparin ANTICOAGULANT  5,000 Units Subcutaneous Q8H     hydrocortisone sodium succinate PF  50 mg Intravenous Q6H     insulin aspart  1-4 Units Subcutaneous Q4H     ipratropium - albuterol 0.5 mg/2.5 mg/3 mL  3 mL Nebulization Q4H     lidocaine  5 mL Topical Once     meropenem  1 g Intravenous Q12H     pantoprazole (PROTONIX) IV  40 mg Intravenous Daily with breakfast     polyethylene glycol  17 g Oral or Feeding Tube Daily     sennosides  5 mL Oral or Feeding Tube Daily     [Held by provider] simvastatin  40 mg Oral At Bedtime     thiamine  200 mg Intravenous Daily     vancomycin place sanders - receiving intermittent dosing  1 each Intravenous See Admin Instructions     Infusion Meds    dexmedetomidine 0.7 mcg/kg/hr (02/22/21 1500)     CRRT replacement solution       DOBUTamine 2.5 mcg/kg/min (02/22/21 1504)     fentaNYL 100 mcg/hr (02/22/21 1500)     - MEDICATION INSTRUCTIONS -       norepinephrine 0.4 mcg/kg/min (02/22/21 1500)     phenylephrine Stopped (02/22/21 1500)     CRRT replacement solution       CRRT replacement solution       sodium bicabonate in 5% dextrose for infusion 200 mL/hr at 02/22/21 1412     vasopressin 2.4 Units/hr (02/22/21 1500)       ALLERGIES:    Allergies   Allergen Reactions     Naproxen GI Disturbance     Lisinopril Cough       REVIEW OF SYSTEMS:  A comprehensive of systems was negative except as noted above.    SOCIAL HISTORY:   Social History     Socioeconomic History     Marital status:      Spouse name: Not on file     Number of  children: 3     Years of education: Not on file     Highest education level: Not on file   Occupational History     Employer: [employer]   Social Needs     Financial resource strain: Not on file     Food insecurity     Worry: Not on file     Inability: Not on file     Transportation needs     Medical: Not on file     Non-medical: Not on file   Tobacco Use     Smoking status: Former Smoker     Packs/day: 1.00     Types: Cigarettes     Smokeless tobacco: Never Used     Tobacco comment: Reports quitting smoking two years ago   Substance and Sexual Activity     Alcohol use: Not Currently     Alcohol/week: 10.0 standard drinks     Comment: Last drink 2020     Drug use: No     Sexual activity: Not Currently   Lifestyle     Physical activity     Days per week: Not on file     Minutes per session: Not on file     Stress: Not on file   Relationships     Social connections     Talks on phone: Not on file     Gets together: Not on file     Attends Advent service: Not on file     Active member of club or organization: Not on file     Attends meetings of clubs or organizations: Not on file     Relationship status: Not on file     Intimate partner violence     Fear of current or ex partner: Not on file     Emotionally abused: Not on file     Physically abused: Not on file     Forced sexual activity: Not on file   Other Topics Concern     Parent/sibling w/ CABG, MI or angioplasty before 65F 55M? Not Asked   Social History Narrative    Native of [place], in [place] since      Reviewed.  Patient's daughter, Laura, accompanies Michelle Alvarado in hospital room    FAMILY MEDICAL HISTORY:   Family History   Problem Relation Age of Onset     Cerebrovascular Disease Father          age 78     Gastrointestinal Disease Mother          age 79     Reviewed.    PHYSICAL EXAM:   Temp  Av.9  F (37.7  C)  Min: 98.5  F (36.9  C)  Max: 101  F (38.3  C)  Arterial Line BP  Min: 72/38  Max: 185/67  Arterial Line MAP (mmHg)  Avg:  "69.3 mmHg  Min: 47 mmHg  Max: 104 mmHg      Pulse  Av.1  Min: 84  Max: 139 Resp  Av.1  Min: 10  Max: 46  FiO2 (%)  Av %  Min: 40 %  Max: 100 %  SpO2  Av.5 %  Min: 85 %  Max: 100 %       /65   Pulse 98   Temp 100.3  F (37.9  C) (Axillary)   Resp 28   Ht 1.6 m (5' 2.99\")   Wt 78.4 kg (172 lb 13.5 oz)   SpO2 92%   BMI 30.63 kg/m     Date 21 0700 - 21 0659   Shift 3547-0237 0269-4174 6989-8389 24 Hour Total   INTAKE   I.V. 1154.36 573.23  1727.59   IV Piggyback 1000   1000   Shift Total(mL/kg) 2154.36(27.48) 573.23(7.31)  2727.59(34.79)   OUTPUT   Urine 160 10  170   Shift Total(mL/kg) 160(2.04) 10(0.13)  170(2.17)   Weight (kg) 78.4 78.4 78.4 78.4      Admit Weight: 78.4 kg (172 lb 13.5 oz)     GENERAL APPEARANCE: intubated, sedated  EYES: no scleral icterus, pupils equal  Endo: no goiter, no moon facies  Pulmonary: clear anteriorly, mechanically ventilated  CV: tachycardic, regular   - no peripheral edema  GI: soft, non distended   MS: no evidence of inflammation in joints, no muscle tenderness  : + min with minimal dark yellow urine  SKIN: no rash, warm, dry, no cyanosis  NEURO: face symmetric, sedated    LABS:   CMP  Recent Labs   Lab 21  1323 21  1157 21  0920 21  0552 21  0421 21  0349 21  1855 21  1855   NA  --  135 134 136  --  Canceled, Test credited  --  128*   POTASSIUM  --  4.6 4.4 4.4 4.0 Canceled, Test credited   < > 4.6   CHLORIDE  --  109 110* 107  --   --   --  98   CO2  --  12* 12* 14*  --   --   --  20   ANIONGAP  --  15* 13 14  --   --   --  9   GLC  --  67* 120* 79  --   --   --  110*   BUN  --  46* 45* 42*  --   --   --  45*   CR  --  2.88* 2.92* 2.65*  --   --   --  3.63*   GFRESTIMATED  --  21* 21* 24*  --   --   --  16*   GFRESTBLACK  --  25* 24* 27*  --   --   --  19*   JUWAN  --  6.6* 6.0* 6.2*  --   --   --  6.5*   MAG 2.4*  --   --  1.5*  --   --   --   --    PHOS  --  5.2*  --   --   --   --   -- "   --    PROTTOTAL  --   --  4.7* 5.2*  --   --   --  6.1*   ALBUMIN  --   --  1.4* 1.7*  --   --   --  2.2*   BILITOTAL  --   --  7.5* 8.6*  --   --   --  10.3*   ALKPHOS  --   --  133 161*  --   --   --  243*   AST  --   --  139* 139*  --   --   --  112*   ALT  --   --  76* 80*  --   --   --  86*    < > = values in this interval not displayed.     CBC  Recent Labs   Lab 02/22/21  0552 02/21/21  1855   HGB 12.7* 11.8*   WBC 4.1 6.9   RBC 4.02* 3.78*   HCT 38.7* 34.4*   MCV 96 91   MCH 31.6 31.2   MCHC 32.8 34.3   RDW 15.1* 14.6    339     INR  Recent Labs   Lab 02/22/21  0552   INR 1.41*   PTT 41*     ABG  Recent Labs   Lab 02/22/21  1345 02/22/21  1154 02/22/21  0920   PH  --  7.24* 7.23*   PCO2  --  23* 25*   PO2  --  94 113*   HCO3  --  10* 11*   O2PER 40 40 60      URINE STUDIES  Recent Labs   Lab Test 02/22/21  0246   COLOR Dark Yellow   APPEARANCE Slightly Cloudy   URINEGLC Negative   URINEBILI Moderate*   URINEKETONE Negative   SG 1.018   UBLD Large*   URINEPH 5.5   PROTEIN 30*   NITRITE Negative   LEUKEST Negative   RBCU 1   WBCU 8*     No lab results found.  PTH  No lab results found.  IRON STUDIES  No lab results found.    IMAGING:  All imaging studies reviewed by me.     Lima Juan MD     I have seen and examined this patient with the fellow.  This note reflects our joint assessment and plan.     Julianna Shah MD

## 2021-02-22 NOTE — PLAN OF CARE
ICU End of Shift Summary. See flowsheets for vital signs and detailed assessment.    Changes this shift: Pt sedated with precedex gtt, versed bumps, and fentanyl gtt. CMV settings, 50%. Intubated at 0900 today per anesthesia. Small to mod amount of ETT and PO secretions - thick/creamy. NPO - OG to LIS. MAP >65, levo gtt, vaso gtt, and phenyl gtt titrated throughout the day. Dobutamine gtt also started. Tmax 101. SR high 90s to ST 100s-110s. 2 amps bicarb given this AM - Bicarb gtt added today and CRRT starting this evening. Femoral/central/HD line all placed today per MDs. Bladder pressures 12 and 15 this shift. Minimal, dark UO. Small BM this shift.     Plan: Start CRRT when machine is available. Continue current POC.

## 2021-02-23 NOTE — PLAN OF CARE
Problem: Fluid Imbalance (Pancreatitis)  Goal: Fluid Balance  Outcome: No Change     ICU End of Shift Summary. See flowsheets for vital signs and detailed assessment.    Changes this shift: RASS -3. Pt's heart rhythm was sinus tachycardia for beginning of shift, but then around 0200, pt went into A fib RVR with rates up to 200's - team notified - amio bolus administered and then gtt started. Rhythm now switching between A fib and A flutter. Titrated pressors as able - started the shift on levo, phenyl, angiotensin II, vaso, and dobutamine - now only requiring levo, vaso, and dobutamine to maintain MAPs > 65. Q4h ABGs and VBGs - FiO2 increased to 70%. Pt was biting the ETT, causing vent to alarm - bite block placed by RT. Minimal UOP (team aware). Bladder pressures were 19-20 during shift - team notified - no new orders at this time. Unable to meet goal of I=O with CRRT d/t increasing pressor needs. Machine clotted around 0515 and was restarted around 0630. No stool output. Blood sugars were consistently low - team notified - D20 started and titrated to maintain BG > 120, per orders. WBC increased and hgb decreased this AM - team notified - no new orders at this time. Replaced Ca x 2, per orders.    Plan: Continue to wean FiO2 and pressors as able. Notify team with any changes.

## 2021-02-23 NOTE — PHARMACY-VANCOMYCIN DOSING SERVICE
Pharmacy Vancomycin Note  Date of Service 2021  Patient's  1955   66 year old, male    Indication: Intra-abdominal infection and Sepsis  Goal Trough Level: 15-20 mg/L  Day of Therapy: 2  Current Vancomycin regimen: intermitent    Current estimated CrCl = CRRT, started today    Creatinine for last 3 days  2021:  6:55 PM Creatinine 3.63 mg/dL  2021:  5:52 AM Creatinine 2.65 mg/dL;  9:20 AM Creatinine 2.92 mg/dL; 11:57 AM Creatinine 2.88 mg/dL;  3:58 PM Creatinine 3.08 mg/dL    Recent Vancomycin Levels (past 3 days)  2021:  6:13 PM Vancomycin Level 11.6 mg/L    Vancomycin IV Administrations (past 72 hours)                   vancomycin 1500 mg in 0.9% NaCl 250 ml intermittent infusion 1,500 mg (mg) 1,500 mg New Bag 21 2327                Nephrotoxins and other renal medications (From now, onward)    Start     Dose/Rate Route Frequency Ordered Stop    21 2200  vancomycin 1500 mg in 0.9% NaCl 250 ml intermittent infusion 1,500 mg      1,500 mg  over 90 Minutes Intravenous EVERY 24 HOURS 21 2130      21 1800  phenylephrine (CAMI-SYNEPHRINE) 200 mg in sodium chloride 0.9 % 250 mL MAX CONC infusion      0.5-6 mcg/kg/min × 78.4 kg (Dosing Weight)  2.9-35.3 mL/hr  Intravenous CONTINUOUS 21 1738      21 0930  norepinephrine (LEVOPHED) 16 mg in  mL infusion CENTRAL LINE      0.03-0.4 mcg/kg/min × 68.8 kg (Dosing Weight)  1.9-25.8 mL/hr  Intravenous CONTINUOUS 21 0926      21 0900  vasopressin 40 units in NS 40 mL (PITRESSIN) infusion      2.4 Units/hr  2.4 mL/hr  Intravenous CONTINUOUS 21 0858               Contrast Orders - past 72 hours (72h ago, onward)    Start     Dose/Rate Route Frequency Ordered Stop    21 1230  perflutren diluted 1mL to 2mL with saline (OPTISON) diluted injection 5 mL      5 mL Intravenous ONCE 21 1225 21 1225          Interpretation of levels and current regimen:  Trough level is   Subtherapeutic    Has serum creatinine changed > 50% in last 72 hours: No  Urine output:  diminishing  Renal Function: CRRT initiated today    Plan:  1.  Schedule Vancomyicn 1500 mg iv q24 (dosed for CRRT)  2.  Pharmacy will check trough levels as appropriate in 1-3 Days.    3. Serum creatinine levels will be ordered daily for the first week of therapy and at least twice weekly for subsequent weeks.      Cher Hopper, PharmD        .

## 2021-02-23 NOTE — PROGRESS NOTES
Patient seen and examined. Overnight events reviewed including the complex medical care given throughout.    Mr Jenny required upwards of 5 pressors last evening and has evidence of lower EF with troponin leak in the setting of the severe acute pancreatitis with multiorgan failure. His ventilator settings remain mostly stable and he has been given early CRRT. His gases seem to be improving and he now is on three pressors and trending in the right direction.    Imaging demonstrates pancreaticobiliary stents in their desired positions and no GI interventions are recommended. It is appreciated that his AST and ALT are now markedly elevated in a 2:1 fashion though this is not surprising in this setting of critical illness on pressors.    Results from his FNB remain pending though cholangiocarcinoma is strongly suspected.    Our team will continue to follow daily.    Frandy Sanford MD PhD FACG BEV LUQUE  Director of Endoscopy  Associate Professor of Medicine, Surgery and Pediatrics  Interventional and Therapeutic Endoscopy    [facility]  Division of Gastroenterology and Hepatology  [facility] [address] [address]    New Consultations  [phone]  Procedure Scheduling [phone]  Clinical Nurse Coordinator [phone]  Clinical Fax   [phone]  Administrative   [phone]  Administrative Fax  [phone]

## 2021-02-23 NOTE — PLAN OF CARE
CRRT STATUS NOTE    DATA:  Time:  5:36 PM  Pressures WNL:  YES  Filter Status:  WDL    Problems Reported/Alarms Noted:  None    Supplies Present:  YES    ASSESSMENT:  Patient Net Fluid Balance:  1.7L positive  Vital Signs:  Temp: 95.9  F (35.5  C)(bear hugger applied) Temp src: Axillary BP: 91/76 Pulse: 75   Resp: 28 SpO2: 95 % O2 Device: Mechanical Ventilator    Labs:    Lab Results   Component Value Date    WBC 14.2 (H) 02/23/2021    HGB 9.8 (L) 02/23/2021    HCT 27.7 (L) 02/23/2021     02/23/2021     02/23/2021    POTASSIUM 4.8 02/23/2021    CHLORIDE 107 02/23/2021    CO2 17 (L) 02/23/2021    BUN 32 (H) 02/23/2021    CR 2.05 (H) 02/23/2021     (H) 02/23/2021    TROPI 0.721 (HH) 02/23/2021    AST 2,194 (HH) 02/23/2021    ALT 1,020 (HH) 02/23/2021    ALKPHOS 126 02/23/2021    BILITOTAL 7.6 (H) 02/23/2021    INR 1.41 (H) 02/22/2021       Goals of Therapy:  I=O as able    INTERVENTIONS:   None    PLAN:  Continue per plan of care, call CRRT resource with questions or concerns. [id number]

## 2021-02-23 NOTE — PROGRESS NOTES
MEDICAL ICU H&P  02/23/2021    Date of Hospital Admission: 2/21  Date of ICU Admission: 2/22  Reason for Critical Care Admission:   Date of Service (when I saw the patient): 02/23/2021    ASSESSMENT: Michelle Alvarado is a 66 year old male with PMH of left liver lobe mass c/f cholangiocarcinoma s/p EUS and ERCP (2/19/2021), HTN, and HLD who presented to the ED on 2/21 with sharp epigastric and RUQ pain and N/V since 2/19, and was admitted for post-ERCP pancreatitis, severe sepsis, and significant acute kidney injury.      CHANGES and MAJOR THINGS TODAY:   - Off angiotensin and phenylephrine  - Remains on dobutamine, norepi, and vasopressin   - Started amiodarone due to a fib RVR  - Start flolan  - Plan to prone at 1600  - CRRT goal I=O if possible    PLAN:    Neuro:  # Acute Pain and sedation needs  - Fentanyl gtt with PRN boluses  - Versed gtt   - RASS goal -2 to -3  - PRN versed   - PRN acetaminophen    Pulmonary:  # Acute hypoxic respiratory failure  # ARDS  # Tobacco use disorder, 50 year smoking history   # Pulmonary nodules. CT 2/11   CT 2/11 with calcified granuloma in the left upper lobe. 2 mm left upper lobe benign appearing solitary pulmonary nodule is unchanged since at least 11/8/2019. 2 mm nodule in the posterior left lower lobe is new since 11/13/2020. Not on supplemental 02 at home. Initially presented on RA. Failed bipap, intubated for hypoxia on 2/22. P/F 120 after flolan on 2/23, initiated proning.   - CMV/AC RR 28  PEEP 14 FiO2 60%   - Vt >6ml/kg in attempt to drive down co2 in setting of acidosis. Will plan to decrease to goal of 6ml/kg once acidosis is corrected  - Duonebs q4h  - ABG q4h  - Started flolan 20 ng/kg/min  - Prone at 1700     Cardiovascular:  # Shock, distributive, presumed septic.   # Stress cardiomyopathy   Likely mixed shock. Received 4L of crystalloid resuscitation over last 16H in ED. Troponin peaked 0.728 7/22 PM. TTE 2/22 LVEF 30-35%, RV function mild to moderately  reduced. CI this AM 3.   - Levophed, vasopressin, dobutamine to keep MAP > 65  - Off angiotensin and phenylephrine  - Stress dose steroids   - Trend VBG to monitor SVO2   - Consider flotrack monitoring    - Hold daily ASA in setting of FANG   - Hold home losartan  - Goal I=O     # A fib RVR  - Started on amio gtt overnight.     # CAD  # History of hypertension  # History of hyperlipidemia  - Hold home statin in setting of elevated transaminases     GI/Nutrition:  # Acute pancreatitis   # Concern for cholangitis vs. biliary obstruction, vs. iintra-abdominal infection in setting of recent ERCP   # Hyperbilirubinemia   Pancreaticobiliary stents in their desired positions and no GI interventions are recommended.   - GI consulted, recommend conservative management with aggressive fluid resuscitation   - NPO for now  - Trend lactate q6h  - abx per ID    # Suspected cholangiocarcinoma  - FNA results pending    # hepatitis A, hep B core yann positive, surf yann negative 12/20 hep negative  12/14/20    # Constipation  No BM X 4 days PTA, had a Bm in ED.   - Bowel regimen with miralax, senna, colace     # Concern for increased intraabdominal pressures  - Monitor bladder pressures    #Shock Liver   --> 2194, ALT 80 --> 1020, markedly elevated in 2:1. In setting of critical illness on pressors.   - Continue to monitor    # GERD without esophagitis   # History of H pylori gastritis 2015  - Pantoprazole 40 mg qd    Renal/Fluids/Electrolytes:  # Oliguric FANG, suspect prerenal, now evolving to ATN in setting of shock   Significant interval worsening of his renal function (Cr 0.96 on 2/12, Cr 3.63, BUN 45 on presentation), with reported anuria-oliguria. Likely pre-renal vs ATN in the setting of severe sepsis, pancreatitis.  - Ramirez in place with bladder pressure monitoring q 8 H   - trend UOP.   - CRRT     #Metabolic acidosis, secondary to lactic acidosis, ARF  S/p 1 amp bicarb   - Follow ABG q6H  - Stopbicarb gtt now CRRT  initiated   - Daily thiamine for type b/mixed lactic acidosis     # Hyponatremia   # Hypomagnesemia  # Hypocalcemia   - CRRT    Endocrine:  # Prediabetes  Hgb Aic 6.3 on 11/2020, 5.8 this admission not on PTA meds.  - BG check q 4H, sliding scale insulin   - D20 gtt to prevent hypoglycemia while NPO    ID:  # Concern for sepsis  Leukocytosis 14.2 today.   - Follow cultures  - Antibiotics as below    Studies:   BC 2/21 x2 2/23 NGTD  NGTD  Covid neg  Sputum NGTD    Antibiotics   Vanc 2/21-  Meropenem 2/22-   Fluconazole 2/22-  Zosyn 2/21-2/22    Hematology:    # Acute on chronic normocytic anemia   Hgb 11.8 on presentation. Baseline ~13-14. Hgb 9.8 2/23, likely dilutional and also in setting of sepsis and bone marrow suppression.  - Monitor CBC    #Coagulopathy   INR 1.41 not on AC prior, suspect elevated in setting of acute liver injury. Fibrinogen elevated 638.  - Monitor INR    Musculoskeletal:  # Chronic hip/back pain  # History of avascular L necrosis of hip  Surgery previously ecommended but not pursued.   - No acute issues.    Skin:  # No acute issues     General Cares/Prophylaxis:    DVT Prophylaxis: Pneumatic Compression Devices, heparin subcutaneous   GI Prophylaxis: IV PPI  Restraints: mitts   Family Communication: daughter updated at bedside  Code Status: FULL. Discussed with daughter again 2/23, family has not had extensive discussions on patient's goals of care but believe would wish to pursue full care.    Lines/tubes/drains:  - 2 PIV  - Left internal jugular central line  - right femoral arterial line     Disposition:  - Medical ICU     Patient seen and findings/plan discussed with medical ICU staff, Dr. Matt.    Luis Alfredo Bartlett MD  PGY-1, Internal Medicine  MICU 2 Service    -----------------------------------------------------------------------    INTERVAL EVENTS:   Overnight on 5 pressors (NE, vasopressin, dobutamine, phenylephrine, and angiotensin. Now on dobutamin 2.5, norepi 0.23, vaso 4.  Developed a fib RVR, started on amiodarone. Dobutamine decreased from 5 to 2.5 at that time.     PHYSICAL EXAMINATION:  Temp:  [97.7  F (36.5  C)-101  F (38.3  C)] 98.1  F (36.7  C)  Pulse:  [] 123  Resp:  [18-31] 28  BP: ()/() 91/76  MAP:  [47 mmHg-112 mmHg] 68 mmHg  Arterial Line BP: ()/(38-68) 140/49  FiO2 (%):  [40 %-100 %] 70 %  SpO2:  [79 %-100 %] 92 %     General: Intubated, sedated, lying in bed, NAD  HEENT: Normocephalic, atraumatic, neck supple no lymphadenopathy   Neuro: no purposefully movement, PERRL   Pulm/Resp: Clear breath sounds bilaterally without rhonchi, crackles or wheeze, no accessory muscle use   CV: RRR, no m/r/g. DP and radial pulses 1+ palpable   Abdomen: Rounded, firm, guarding, hypoactive BS  : min catheter in place, urine dark yellow and without sediment   Incisions/Skin: Mottling noted bilateral LE to thighs.     LABS: Reviewed.   Arterial Blood Gases   Recent Labs   Lab 02/23/21 0445 02/23/21  0008 02/22/21 2010 02/22/21  1600   PH 7.34* 7.32* 7.31* 7.29*   PCO2 31* 31* 27* 26*   PO2 66* 67* 78* 55*   HCO3 17* 16* 14* 13*     Complete Blood Count   Recent Labs   Lab 02/23/21 0445 02/22/21  0552 02/21/21  1855   WBC 14.2* 4.1 6.9   HGB 9.8* 12.7* 11.8*    329 339     Basic Metabolic Panel  Recent Labs   Lab 02/23/21 0445 02/22/21  2213 02/22/21  1558 02/22/21  1157    138 137 135   POTASSIUM 4.6 4.4 4.8 4.6   CHLORIDE 108 109 108 109   CO2 16* 16* 14* 12*   BUN 38* 43* 48* 46*   CR 2.37* 2.65* 3.08* 2.88*   * 94 108* 67*     Liver Function Tests  Recent Labs   Lab 02/23/21  0445 02/22/21  0920 02/22/21  0552 02/21/21  1855   AST 2,194* 139* 139* 112*   ALT 1,020* 76* 80* 86*   ALKPHOS 126 133 161* 243*   BILITOTAL 7.6* 7.5* 8.6* 10.3*   ALBUMIN 1.1* 1.4* 1.7* 2.2*   INR  --   --  1.41*  --      Coagulation Profile  Recent Labs   Lab 02/22/21  0552   INR 1.41*   PTT 41*       IMAGING:  Recent Results (from the past 24 hour(s))   XR  Abdomen 1 View    Narrative    Exam: XR ABDOMEN 1 VW, 2/22/2021 10:45 AM    Indication: OG placement    Comparison: CT abdomen and pelvis 2/21/2021    Findings:   Portable supine abdominal radiograph. OG tube tip in the region of the  descending duodenum with side hole towards the pylorus. Plastic  biliary and pancreatic stents in place. Scattered gas-filled loops of  bowel demonstrated some of which appear to be small bowel loops,  measuring up to 4 cm, nonspecific. There is gas present to the  rectosigmoid colon. Pattern was nonobstructed on CT. Degenerative  changes. Limited assessment for free air on supine imaging.      Impression    Impression: OG tube tip postpyloric in the proximal duodenum. Biliary  and pancreatic stents remain in place.    ANDERSON GUTIERREZ MD   XR Chest Port 1 View    Narrative    EXAM: XR CHEST PORT 1 VW  2/22/2021 10:46 AM     HISTORY:  ETT and line placement       COMPARISON:  Chest CT 2/11/2021, abdomen and pelvis CT 2/21/2021.    FINDINGS: AP radiograph of the chest. Endotracheal tube projecting  over the midthoracic trachea. Left IJ central venous catheter with tip  overlying the mid SVC. Enteric tube coursing inferior to the diaphragm  with the tip outside the field of view.    Cardiomediastinal silhouette is within normal limits. Small left and  trace right pleural effusions. No pneumothorax. Perihilar opacities  with mixed consolidative and airspace opacities at the left greater  than right lung base. The visualized upper abdomen is unremarkable. No  acute osseous abnormality.      Impression    IMPRESSION:  1. Endotracheal tube with tip overlying the mid thoracic trachea  approximately 4 cm cephalad to the altagracia.  2. Left IJ central venous catheter with tip overlying the mid SVC. No  pneumothorax.  3. Mixed interstitial and airspace opacities at the lung bases with  small left pleural effusion. Findings possibly representing atypical  infection versus pulmonary edema.    I  have personally reviewed the examination and initial interpretation  and I agree with the findings.    ZACHERY JARAMILLO MD   Echo Complete    Narrative    [id number]  [id number]  [id number]  [id number]^BESSY^VANNA           [facility]  Echocardiography Laboratory  [address] [place] [id number]     Name: GORDY CAMP  MRN: [id number]  : 1955  Study Date: 2021 11:56 AM  Age: 66 yrs  Gender: Male  [place] [facility] [place]  Reason For Study: Other, Please Specify in Comments  Ordering Physician: VANNA DOHERTY  Performed By: Alyssa Norris RDCS     BSA: 1.7 m2  Height: 63 in  Weight: 151 lb  HR: 104  BP: 106/51 mmHg  _____________________________________________________________________________  __        Procedure  Complete Portable Echo Adult. Contrast Optison. Patient was given 5 ml mixture  of 3 ml Optison and 6 ml saline. 4 ml wasted.  _____________________________________________________________________________  __        Interpretation Summary  The Ejection Fraction was calculated using Bi-plane contrast at 35% and  visually is estimated at 30-35% with moder diffuse hypokinesis normal size and  wall thickness.     Global right ventricular function is mildly to moderately reduced     Trivial pericardial effusion is present.     No significant valvular abnormalities present.     There is no prior study for direct comparison.  _____________________________________________________________________________  __        Left Ventricle  Left ventricular wall thickness is normal. Left ventricular size is normal.  The Ejection Fraction is estimated at 30-35%. Left ventricular diastolic  function is normal. The Ejection Fraction was calculated using Bi-plane  contrast. Moderate diffuse hypokinesis is present.     Right Ventricle  Right ventricular wall thickness is normal. Global right ventricular function  is mildly to moderately reduced.     Atria  The atrial septum  is intact as assessed by color Doppler .     Mitral Valve  The mitral valve is normal. Trace mitral insufficiency is present.        Aortic Valve  Aortic valve is normal in structure and function. The aortic valve is  tricuspid.     Tricuspid Valve  Trace tricuspid insufficiency is present. The right ventricular systolic  pressure is approximated at 9.6 mmHg plus the right atrial pressure.     Pulmonic Valve  pulmonic valve is not well vizualized.     Vessels  Sinuses of Valsalva 3.1 cm. Ascending aorta 2.8 cm. IVC diameter <2.1 cm  collapsing >50% with sniff suggests a normal RA pressure of 3 mmHg.     Pericardium  Prominent epicardial fat is noted. Trivial pericardial effusion is present.        Miscellaneous  No significant valvular abnormalities present.     Compared to Previous Study  There is no prior study for direct comparison.  _____________________________________________________________________________  __     MMode/2D Measurements & Calculations     EF(MOD-bp): 34.3 %  LA Volume Index (BP): 21.6 ml/m2  TAPSE: 1.2 cm        Doppler Measurements & Calculations  MV E max oseas: 79.6 cm/sec  MV A max oseas: 122.6 cm/sec  MV E/A: 0.65  MV dec slope: 776.6 cm/sec2  MV dec time: 0.10 sec     PA acc time: 0.09 sec  TR max oseas: 154.8 cm/sec  TR max P.6 mmHg  E/E' av.7  Lateral E/e': 14.1  Medial E/e': 15.3     _____________________________________________________________________________  __           Report approved by: Awais Jacobs 2021 01:36 PM      XR Chest Port 1 View    Narrative    EXAM: XR CHEST PORT 1 VW  2021 3:55 PM     HISTORY:  RIJ dialysis placement       COMPARISON:  Chest x-ray 2021 at 10:01 AM    FINDINGS:   Semiupright portable AP view of the chest. Interval placement of right  IJ central venous catheter with tip projecting in the low SVC.  Endotracheal tube tip in similar position in the midthoracic trachea.  Enteric tube passes below the left hemidiaphragm and out  of field of  view. Left IJ central venous catheter with tip projecting in the mid  SVC.    Trachea is midline. Cardiac mediastinal silhouette is within normal  limits. No significant change in perihilar opacities with mixed  consolidative and airspace opacities at the left greater than right  lung bases. No pneumothorax is appreciated. Visualized abdomen is  unremarkable.      Impression    IMPRESSION:     1. Interval placement of right IJ dialysis catheter with tip projected  in the low SVC. Stable position of other support devices.  2. No significant change in mixed interstitial and airspace opacities,  left greater than right, with left basilar pleural effusion.    I have personally reviewed the examination and initial interpretation  and I agree with the findings.    KALLIE ARTIS MD

## 2021-02-23 NOTE — PROGRESS NOTES
"  Nephrology Progress Note  02/23/2021         Assessment & Recommendations:   Michelle Alvarado is a 66 year old male with PMH of left liver lobe mass c/f cholangiocarcinoma s/p EUS and ERCP (2/19/2021), HTN, and HLD admitted for post ERCP pancreatitis and now with septic shock. Nephrology consulted for anuric FANG and metabolic acidosis. Started on CRRT 2/22/21.     # Oliguric FANG  Cr 2.88, up from baseline ~0.8-0.9. FANG secondary to likely ATN in setting of septic shock. Minimal UOP. CRRT initiated 2/22. Patient remains critically ill.  - Continue CRRT                - 25ml/kg/hr, 4K, goal Is=Os  - Follow Is/Os  - Daily weights     # Anion Gap Metabolic Acidosis  Most recent pH 7.3 with ongoing lactic acidosis. Appropriate respiratory compensation.   - CRRT as above  - Vent, sepsis management per ICU team    # Volume  Diffuse 3rd spacing in setting of pancreatitis, distributive shock. Remains on pressors. Will attempt to reach Is=Os on CRRT as able.       Recommendations were communicated to primary team via note    Seen and discussed with Dr. Pablo Juan MD   [phone]    Interval History :   Nursing and provider notes from last 24 hours reviewed.  Increasing pressor requirements overnight. At one point, he was on 4 pressors + dobutamine. Now on 2 pressors + dobutamine. Lactate trended down to 5.0, now rising again. Unable to reach Is=Os on CRRT at this time. FiO2 was at 70% this AM, now down to 60% with PEEP of 10.       Review of Systems:   ROS unobtainable - patient is intubated and sedated.    Physical Exam:   I/O last 3 completed shifts:  In: 5278.45 [I.V.:4048.45; Other:230; IV Piggyback:1000]  Out: 671 [Urine:320; Emesis/NG output:200; Other:151]   BP 91/76 (BP Location: Right leg)   Pulse 77   Temp 97.5  F (36.4  C) (Axillary)   Resp 28   Ht 1.6 m (5' 2.99\")   Wt 81.1 kg (178 lb 12.7 oz)   SpO2 98%   BMI 31.68 kg/m       GENERAL APPEARANCE: intubated, sedated  HENT: ETT in place  PULM: " coarse bilaterally  CV: tachycardic, regular     -1+ edema in lower extremities  GI: soft, moderately distended  INTEGUMENT: lower feet cool with cyanosis  NEURO:  sedated  Access RIJ temp HD line    Labs:   All labs reviewed by me  Electrolytes/Renal -   Recent Labs   Lab Test 02/23/21  0927 02/23/21  0445 02/22/21  2213 02/22/21  1558 02/22/21  1323 02/22/21  1157    136 138 137  --  135   POTASSIUM 4.6 4.6 4.4 4.8  --  4.6   CHLORIDE 108 108 109 108  --  109   CO2 18* 16* 16* 14*  --  12*   BUN 36* 38* 43* 48*  --  46*   CR 2.16* 2.37* 2.65* 3.08*  --  2.88*   * 110* 94 108*  --  67*   JUWAN 7.4* 7.8* 7.6* 7.0*  --  6.6*   MAG  --  2.3  --  2.1 2.4*  --    PHOS  --  6.1*  --  5.6*  --  5.2*       CBC -   Recent Labs   Lab Test 02/23/21 0445 02/22/21  0552 02/21/21  1855   WBC 14.2* 4.1 6.9   HGB 9.8* 12.7* 11.8*    329 339       LFTs -   Recent Labs   Lab Test 02/23/21 0445 02/22/21  0920 02/22/21  0552   ALKPHOS 126 133 161*   BILITOTAL 7.6* 7.5* 8.6*   ALT 1,020* 76* 80*   AST 2,194* 139* 139*   PROTTOTAL 4.2* 4.7* 5.2*   ALBUMIN 1.1* 1.4* 1.7*       Iron Panel - No lab results found.      Imaging:  All imaging studies reviewed by me.     Current Medications:    amiodarone         [Held by provider] aspirin  81 mg Oral QAM     docusate  100 mg Oral or Feeding Tube Daily     fluconazole  200 mg Intravenous Q24H     fludrocortisone  50 mcg Oral or Feeding Tube Daily     heparin ANTICOAGULANT  5,000 Units Subcutaneous Q8H     hydrocortisone sodium succinate PF  50 mg Intravenous Q6H     insulin aspart  1-4 Units Subcutaneous Q4H     meropenem  1 g Intravenous Q8H     pantoprazole (PROTONIX) IV  40 mg Intravenous Daily with breakfast     polyethylene glycol  17 g Oral or Feeding Tube Daily     sennosides  5 mL Oral or Feeding Tube Daily     [Held by provider] simvastatin  40 mg Oral At Bedtime     thiamine  200 mg Intravenous Daily     vancomycin (VANCOCIN) IV  1,500 mg Intravenous Q24H        amiodarone 0.5 mg/min (02/23/21 1200)     dextrose 25 mL/hr at 02/23/21 1205     CRRT replacement solution 12.5 mL/kg/hr (02/23/21 1037)     DOBUTamine 2.5 mcg/kg/min (02/23/21 1200)     epoprostenol (VELETRI) 20 mcg/mL in sterile water inhalation solution 20 ng/kg/min (02/23/21 1137)     fentaNYL 150 mcg/hr (02/23/21 1200)     midazolam 3 mg/hr (02/23/21 1200)     - MEDICATION INSTRUCTIONS -       norepinephrine 0.3 mcg/kg/min (02/23/21 1215)     CRRT replacement solution 200 mL/hr at 02/23/21 0544     CRRT replacement solution 12.5 mL/kg/hr (02/23/21 0543)     vasopressin 2.4 Units/hr (02/23/21 1200)     Lima Juan MD     I have seen and examined this patient with the fellow.  This note reflects our joint assessment and plan.     Julianna Shah MD

## 2021-02-23 NOTE — PLAN OF CARE
ICU End of Shift Summary. See flowsheets for vital signs and detailed assessment.    Changes this shift: Pt sedated with versed and fentanyl gtt. Afib in AM, converted to SR at 1020 with rates 60s-80s. MAP >65, levo and vaso gtt titrated throughout the day with dobutamine at straight rate. Able to stay off phenyl and angio 2 this shift. Temp 96- bear hugger placed on pt. CMV settings, TV changed to 360 and PEEP changed to 14 today. Started veletri this shift and proned at 1710. Minimal ETT and PO secretions. OG remains to LIS, 1x smear/small BM - rectal pouch placed while proned. Ramirez in place with low UOP. Bladder pressures 11 & 18. D20 gtt titrated to 25 ml/hr to keep BG stable. CRRT goal I=O, unable to meet this shift.     Plan: Keep in prone position overnight - reassess in AM with MDs. Continue current POC.

## 2021-02-24 NOTE — PROGRESS NOTES
Due to hemodynamic instability , we will continue CRRT I=O   With worsening acidosis will change post filter to bicarb .   Will switch post filter back to 4k bath once Bicarb is >18  Plan d/w bedside RN  Mik Wang MD, FACP  Nephrology Fellow   [facility]   Pager [phone]    ADDENDUM  7 AM   K 5.6   Changed prefilter and dialysate to 2 k bath   Continue post filter a sodium bicarb infusion  Mik Wang MD, FACP  Nephrology Fellow   [facility]   Pager [phone]      Julianna Shah MD

## 2021-02-24 NOTE — PLAN OF CARE
Problem: Fluid Imbalance (Pancreatitis)  Goal: Fluid Balance  Outcome: No Change     ICU End of Shift Summary. See flowsheets for vital signs and detailed assessment.    Changes this shift: RASS -3 to -4 throughout shift. Supined patient around 2100, per MD orders. At 2100, left pupil was 2 mm larger than right and fixed - team notified - head/chest/abdomen/pelvis CT ordered and completed. Lactate continues to rise and pH is more acidotic - bicarb added to CRRT post solution. 250 ml LR and albumin boluses given. Titrated pressors as able. Attempted to meet goal of I=O with CRRT, as able. Net positive about 500 ml this AM. Calcium replaced x 2. Potassium was 5.6 this AM - nephrologist notified - CRRT changed to 2K bath. Daughter updated. No other changes overnight.     Plan: Continue to wean pressors and FiO2 as able. Notify team with any changes.

## 2021-02-24 NOTE — PROGRESS NOTES
He has increasing lactic acidosis since he has been proned, and his bladder pressure has been elevated to >20.  ABG shows some improvement in his oxygenation since proning, but he has been on 60% FiO2.  He was supined around 9pm for this reason.       Bedside ultrasound was performed after supination that showed global hypokinesis, mild anterior pericardial effusion.  IVC was ~2cm and collapsible despite PEEP of 14, and ScvO2 was 46.  250mL of crystalloid was given.      New finding of anisocoria - dilated left pupil (4-5mm) and pinpoint right pupil (~2mm).  Head as well as C/A/P CT was ordered for the new anisocoria and increasing lactic acidosis.     I spoke with Mr. Alvarado's daughter, Laura to update her on the patients clinical status.  Laura understands that her father is critically ill, but has not discussed code status with her mother and sister.  I conveyed that he has multiorgan failure requiring very high levels of support, and that there is a reasonable possibility that he may acutely deteriorate.  I also explained that if his multiorgan dysfunction were to progress, CPR is unlikely to change the outcome.  Laura would like to think about this further, and discuss with her mother and sister before making a decision.  Laura's , Froy can also be reached in case of emergency.      Froy (): [phone]    I spent 45 minutes of critical care time evaluating the patient at bedside and speaking to family member, excluding procedures.

## 2021-02-24 NOTE — PROGRESS NOTES
Nephrology Progress Note  02/24/2021         Assessment & Recommendations:   Michelle Alvarado is a 66 year old male with PMH of left liver lobe mass c/f cholangiocarcinoma s/p EUS and ERCP (2/19/2021), HTN, and HLD admitted for post ERCP pancreatitis and now with septic shock. Nephrology consulted for anuric FANG and metabolic acidosis. Started on CRRT 2/22/21.     # Anuric FANG  Cr 2.88, up from baseline ~0.8-0.9. FANG secondary to likely ATN in setting of septic shock. CRRT initiated 2/22.  Changed to 2K with post-filter bicarb overnight. Patient remains critically ill.  - Continue CRRT                - 25ml/kg/hr, 2K, goal Is=Os, post-filter bicarb until bicarb reaches 18  - Follow Is/Os  - Daily weights     # Anion Gap Metabolic Acidosis  Most recent pH 7.27 with ongoing lactic acidosis. Started on post-filter bicarb overnight.  - CRRT as above  - Vent, sepsis management per ICU team    # Volume  Diffuse 3rd spacing in setting of pancreatitis, distributive shock. Remains on pressors. Will reduce pulling goal to prevent further hypotension/worsening of shock.   - Okay to run net positive       Recommendations were communicated to primary team via note    Seen and discussed with Dr. Pablo Juan MD   [phone]    Interval History :   Nursing and provider notes from last 24 hours reviewed.  Overnight, lactate was rising and K was rising. He became more acidotic with pH <7.2. He was changed to 2K bath on CRRT and post-filter fluid changed to isotonic bicarb. His pressor requirements are escalating. CT overnight showed liver infarcts. Condition is critical.       Review of Systems:   ROS unobtainable - patient is intubated and sedated.    Physical Exam:   I/O last 3 completed shifts:  In: 3636.59 [I.V.:2985.59; Other:1; NG/GT:150; IV Piggyback:250]  Out: 1922 [Urine:12; Emesis/NG output:250; Other:1660]   BP (!) 77/44 (BP Location: Right arm)   Pulse 98   Temp 96.6  F (35.9  C) (Axillary)   Resp 28    "Ht 1.6 m (5' 2.99\")   Wt 84.8 kg (186 lb 15.2 oz)   SpO2 100%   BMI 33.13 kg/m       GENERAL APPEARANCE: intubated, sedated  HENT: ETT in place  PULM: coarse bilaterally  CV: tachycardic, regular     -1+ edema in lower extremities  GI: soft, moderately distended  INTEGUMENT: lower feet cool with cyanosis  NEURO:  sedated  Access RIJ temp HD line    Labs:   All labs reviewed by me  Electrolytes/Renal -   Recent Labs   Lab Test 02/24/21  1509 02/24/21  1406 02/24/21  1208 02/24/21  1002 02/24/21  0421 02/24/21  0421 02/23/21  2123 02/23/21  1512 02/23/21 0445 02/23/21 0445   NA  --   --   --  134  --  134 136 135   < > 136   POTASSIUM 5.9* 5.9* 5.7* 6.1*   < > 5.6* 5.2 4.8   < > 4.6   CHLORIDE  --   --   --  102  --  104 108 107   < > 108   CO2  --   --   --  15*  --  13* 13* 17*   < > 16*   BUN  --   --   --  26  --  28 30 32*   < > 38*   CR  --   --   --  1.76*  --  1.85* 1.89* 2.05*   < > 2.37*   GLC  --   --   --  126*  --  100* 95 117*   < > 110*   JUWAN  --   --   --  7.8*  --  7.5* 8.0* 7.9*   < > 7.8*   MAG  --   --   --   --   --  2.8*  --  2.5*  --  2.3   PHOS  --   --   --   --   --  8.4*  --  7.0*  --  6.1*    < > = values in this interval not displayed.       CBC -   Recent Labs   Lab Test 02/24/21 0421 02/23/21 0445 02/22/21  0552   WBC 25.8* 14.2* 4.1   HGB 9.5* 9.8* 12.7*    222 329       LFTs -   Recent Labs   Lab Test 02/24/21 0421 02/23/21  0445 02/22/21  0920   ALKPHOS 291* 126 133   BILITOTAL 9.2* 7.6* 7.5*   ALT 2,123* 1,020* 76*   AST 5,930* 2,194* 139*   PROTTOTAL 3.9* 4.2* 4.7*   ALBUMIN 1.1* 1.1* 1.4*       Iron Panel - No lab results found.      Imaging:  All imaging studies reviewed by me.     Current Medications:    [Held by provider] aspirin  81 mg Oral QAM     docusate  100 mg Oral or Feeding Tube Daily     fludrocortisone  50 mcg Oral or Feeding Tube Daily     heparin ANTICOAGULANT  5,000 Units Subcutaneous Q8H     hydrocortisone sodium succinate PF  50 mg Intravenous Q6H "     [START ON 2/26/2021] influenza vac high-dose quad  0.7 mL Intramuscular Prior to discharge     meropenem  1 g Intravenous Q8H     micafungin  100 mg Intravenous Q24H     pantoprazole (PROTONIX) IV  40 mg Intravenous Daily with breakfast     polyethylene glycol  17 g Oral or Feeding Tube Daily     sennosides  5 mL Oral or Feeding Tube Daily     [Held by provider] simvastatin  40 mg Oral At Bedtime     thiamine  200 mg Intravenous Daily     vancomycin (VANCOCIN) IV  1,500 mg Intravenous Q24H       dextrose 500 mL (02/24/21 0555)     CRRT replacement solution 12.5 mL/kg/hr (02/24/21 1240)     DOBUTamine 2.5 mcg/kg/min (02/24/21 1500)     epoprostenol (VELETRI) 20 mcg/mL in sterile water inhalation solution 20 ng/kg/min (02/24/21 1134)     fentaNYL 150 mcg/hr (02/24/21 1500)     midazolam 4 mg/hr (02/24/21 1500)     - MEDICATION INSTRUCTIONS -       norepinephrine 0.2 mcg/kg/min (02/24/21 1500)     phenylephrine 0.5 mcg/kg/min (02/24/21 1522)     POST-filter replacement solution for CVVHD & CVVHDF (Sodium Bicarbonate in water 150 mEq/L for infusion) 200 mL/hr at 02/24/21 0752     CRRT replacement solution 12.5 mL/kg/hr (02/24/21 1241)     vasopressin 2.4 Units/hr (02/24/21 1500)     Lima Juan MD     I have seen and examined this patient with the resident.  This note reflects our joint assessment and plan.     Julianna Shah MD

## 2021-02-24 NOTE — PROGRESS NOTES
CRRT STATUS NOTE    DATA:  Time:  5:31 AM  Pressures WNL:  YES  Filter Status:  WDL    Problems Reported/Alarms Noted:  None    Supplies Present:  YES    ASSESSMENT:  Patient Net Fluid Balance:  +552 mL since [place]  Vital Signs:  Temp 96.9    HR 67    /49 (66) mmHg    IAP 14    RR 28    SpO2 100%  Labs:  Reviewed. K+ 5.6, will contact nephrology about potential bath change  Goals of Therapy:  I=O    INTERVENTIONS:   Recirculated circuit for CT scan. No other interventions needed.     PLAN:  Continue plan of care. Contact CRRT resource RN at [phone] with any questions or concerns.

## 2021-02-24 NOTE — PHARMACY
Mr Alvarado is being treated for hyperkalemia.  A pharmacy consult was initiated to review the patient's medication list for possible causes of hyperkalemia.    The following medications for this patient may cause or exacerbate hyperkalemia:  1. The patient was started on a CRRT bath of 4K on 2/22-> changed to a 2K bath today  2. Heparin-  mediated by a reversible effect on aldosterone through blockage of an enzymatic step in the synthesis and angiotensin II receptors in adrenal gland     Cher Hopper, CarmeloD

## 2021-02-24 NOTE — CONSULTS
[facility] Surgery Consultation    Michelle Alvarado MRN# [id number]   Age: 66 year old YOB: 1955     Date of Admission:  2/21/2021    Date of Consult:   2/24/2021    Reason for consult: Concern for abdominal compartment syndrome       Requesting service: MICU; requesting provider: Dr. Matt                   Assessment and Plan:   Assessment:   Michelle Alvarado is a 66 year old male with PMH of left liver lobe mass c/f cholangiocarcinoma s/p EUS and ERCP (2/19/2021), HTN, and HLD who presented to the ED on 2/21 with sharp epigastric and RUQ pain and N/V since 2/19, and was admitted for post-ERCP pancreatitis, severe sepsis, and significant acute kidney injury requiring CRRT. He remains critically ill in the MICU requiring multiple pressors, CRRT and ARDS. Clinical suspicion of abdominal compartment syndrome is very  low at this time due to most recently bladder pressure of 12 and peak mean airway pressure of 21 and reassuring abdominal exam.       Plan:   - Recommend trial of paralysis If worsening pressor requirements, increased peak airway (mean) pressures or increasing bladder pressures. Recommend obtaining  repeat bladder pressure following this  - Bowel rest, NPO  - Even if patient has elevated bladder pressures concerning for intra-abdominal hypertension , will trial conservative management firstwith paralysis, optimizing fluid status and bowel rest.   - General surgery to follow peripherally     Patient discussed with chief resident, Dr. Hoffman, and staff, Dr. Alvarenga.    Tea Summers DO PGY-2  General Surgery Resident    Addendum   Fabi Hoffman MD  Chief Resident (General Surgery)  PGY 5              Chief Complaint:     Concern for abdominal compartment syndrome         History of Present Illness:     Michelle Alvarado is a 66 year old male with PMH of left liver lobe mass c/f cholangiocarcinoma s/p EUS and ERCP (2/19/2021), HTN, and HLD who presented to the ED on 2/21 with sharp  epigastric and RUQ pain and N/V since 2/19, and was admitted for post-ERCP pancreatitis, severe sepsis, and significant acute kidney injury requiring CRRT.   He has been requiring significant hemodynamic support and currently is on dobutamine 2.5, NE 0.18 and vaso 2.4. With ARDS, patient required prone positioning yesterday and has been started on epoprostenol. There is concern today due to increasing abdominal distention. General surgery was consulted for concern for abdominal compartment syndrome. He had bladder pressures overnight to as high as 22, most recently was 12. Peak mean airway pressures on the vent were 21 on rounds this morning. Continues to have a lactic acidosis, most recently of 12. CT scan performed yesterday showed concerns for wedge infarctions and splenic artery pseudoaneurysm.          Past Medical History:     Past Medical History:   Diagnosis Date     Essential hypertension 11/07/2019     Hyperlipidemia LDL goal <130      Liver mass      Low back pain radiating to left leg     Started in April, 2013     Tobacco use disorder              Past Surgical History:     Past Surgical History:   Procedure Laterality Date     COLONOSCOPY  12/31/2020     ENDOSCOPIC RETROGRADE CHOLANGIOPANCREATOGRAM N/A 2/19/2021    Procedure: ENDOSCOPIC RETROGRADE CHOLANGIOPANCREATOGRAPHY WITH BILIARY SPHINCTEROTOMY AND STENT PLACEMENT, PANCREATIC DUCT STENT PLACEMENT;  Surgeon: Frandy Sanford MD;  Location: [facility] OR     ESOPHAGOSCOPY, GASTROSCOPY, DUODENOSCOPY (EGD), COMBINED N/A 2/19/2021    Procedure: ENDOSCOPIC ULTRASOUND WITH FINE NEEDLE BIOPSIES;  Surgeon: Frandy Sanford MD;  Location: [facility] OR     NO HISTORY OF SURGERY              Social History:     Social History     Tobacco Use     Smoking status: Former Smoker     Packs/day: 1.00     Types: Cigarettes     Smokeless tobacco: Never Used     Tobacco comment: Reports quitting smoking two years ago   Substance Use Topics     Alcohol use: Not Currently      Alcohol/week: 10.0 standard drinks     Comment: Last drink 2020            Family History:     Family History   Problem Relation Age of Onset     Cerebrovascular Disease Father          age 78     Gastrointestinal Disease Mother          age 79             Allergies:     Allergies   Allergen Reactions     Naproxen GI Disturbance     Lisinopril Cough             Medications:     Current Facility-Administered Medications   Medication     acetaminophen (TYLENOL) tablet 325 mg     albumin human 5 % injection 250 mL     amiodarone (NEXTERONE) 1,500 mg in D5W 250 mL infusion     [Held by provider] aspirin (ASA) chewable tablet 81 mg     calcium chloride 2 g in sodium chloride 0.9 % 100 mL intermittent infusion     calcium chloride 3 g in sodium chloride 0.9 % 200 mL intermittent infusion     calcium chloride 4 g in sodium chloride 0.9 % 200 mL intermittent infusion     calcium chloride in  mL intermittent infusion 1 g     dextrose 20% infusion     glucose gel 15-30 g    Or     dextrose 50 % injection 25-50 mL    Or     glucagon injection 1 mg     dialysate for CVVHD & CVVHDF (PrismaSol BGK 2/3.5)     DOBUTamine 500 mg in dextrose 5% 250 mL (adult std conc) premix     docusate (COLACE) 50 MG/5ML liquid 100 mg     epoprostenol (VELETRI) inhalation solution     fentaNYL (SUBLIMAZE) 50 mcg/mL bolus from infusion pump  mcg     fentaNYL (SUBLIMAZE) infusion     fludrocortisone (FLORINEF) half-tab 50 mcg     heparin ANTICOAGULANT injection 5,000 Units     heparin lock flush 10 UNIT/ML injection 5 mL     hydrocortisone sodium succinate PF (solu-CORTEF) injection 50 mg     [START ON 2021] influenza vac high-dose quad (FLUZONE HD) injection CROW 0.7 mL     insulin aspart (NovoLOG) injection (RAPID ACTING)     ipratropium - albuterol 0.5 mg/2.5 mg/3 mL (DUONEB) neb solution 3 mL     lidocaine (LMX4) cream     lidocaine 1 % 0.1-1 mL     magnesium sulfate 2 g in water intermittent infusion      meropenem (MERREM) 1 g vial to attach to  mL bag     micafungin (MYCAMINE) 100 mg in sodium chloride 0.9 % 100 mL intermittent infusion     midazolam (VERSED) drip - ADULT 100 mg/100 mL in NS PRE-MIX     midazolam (VERSED) injection 1-2 mg     naloxone (NARCAN) injection 0.2 mg    Or     naloxone (NARCAN) injection 0.4 mg    Or     naloxone (NARCAN) injection 0.2 mg    Or     naloxone (NARCAN) injection 0.4 mg     No heparin required     norepinephrine (LEVOPHED) 16 mg in  mL infusion CENTRAL LINE     ondansetron (ZOFRAN-ODT) ODT tab 4 mg    Or     ondansetron (ZOFRAN) injection 4 mg     pantoprazole (PROTONIX) IV push injection 40 mg     polyethylene glycol (MIRALAX) Packet 17 g     POST-filter replacement solution for CVVHD & CVVHDF (Sodium Bicarbonate in water 150 mEq/L for infusion)     potassium chloride 20 mEq in 50 mL intermittent infusion     PRE-filter replacement solution for CVVHD & CVVHDF (PrismaSol BGK 2/3.5)     sennosides (SENOKOT) syrup 5 mL     [Held by provider] simvastatin (ZOCOR) tablet 40 mg     sodium chloride (PF) 0.9% PF flush 3 mL     sodium chloride (PF) 0.9% PF flush 3 mL     sodium phosphate 20 mmol in D5W 100 mL intermittent infusion     thiamine (B-1) 200 mg in sodium chloride 0.9 % 50 mL intermittent infusion     vancomycin 1500 mg in 0.9% NaCl 250 ml intermittent infusion 1,500 mg     vasopressin 40 units in NS 40 mL (PITRESSIN) infusion               Review of Systems:     See HPI above for pertinent findings.          Physical Exam:   All vitals have been reviewed  Temp:  [95.9  F (35.5  C)-97.6  F (36.4  C)] 96.9  F (36.1  C)  Pulse:  [] 69  Resp:  [28-31] 28  BP: (106)/(78) 106/78  MAP:  [56 mmHg-83 mmHg] 66 mmHg  Arterial Line BP: (103-173)/(40-59) 151/47  FiO2 (%):  [50 %-70 %] 50 %  SpO2:  [84 %-100 %] 99 %    Intake/Output Summary (Last 24 hours) at 2/24/2021 0750  Last data filed at 2/24/2021 0700  Gross per 24 hour   Intake 3535.89 ml   Output 2042 ml   Net  1493.89 ml       Physical Exam:   Gen: Laying in bed, intubated and sedated  Pulm: intubated  CV: RRR  Abd: soft, significantly distended, no peritonitis  Extremities: Petechiae to bilateral hands           Data:   All laboratory data reviewed    Results:  BMP  Recent Labs   Lab 02/24/21 0421 02/23/21 2123 02/23/21 1512 02/23/21  0927    136 135 135   POTASSIUM 5.6* 5.2 4.8 4.6   CHLORIDE 104 108 107 108   CO2 13* 13* 17* 18*   BUN 28 30 32* 36*   CR 1.85* 1.89* 2.05* 2.16*   * 95 117* 152*     CBC  Recent Labs   Lab 02/24/21 0421 02/23/21 0445 02/22/21  0552 02/21/21  1855   WBC 25.8* 14.2* 4.1 6.9   HGB 9.5* 9.8* 12.7* 11.8*    222 329 339     LFT  Recent Labs   Lab 02/24/21 0421 02/23/21 0445 02/22/21  0920 02/22/21  0552   AST 5,930* 2,194* 139* 139*   ALT 2,123* 1,020* 76* 80*   ALKPHOS 291* 126 133 161*   BILITOTAL 9.2* 7.6* 7.5* 8.6*   ALBUMIN 1.1* 1.1* 1.4* 1.7*   INR  --   --   --  1.41*     Recent Labs   Lab 02/24/21 0429 02/24/21 0421 02/24/21 0225 02/24/21  0059 02/23/21 2123 02/23/21 2032 02/23/21  1825 02/23/21  1515 02/23/21  1512 02/23/21  0927 02/23/21 0927 02/23/21 0445 02/23/21 0445 02/22/21 2213 02/22/21 2213   GLC  --  100*  --   --  95  --   --   --  117*  --  152*  --  110*  --  94   BGM 97  --  91 90  --  83 93 113*  --    < > 142*   < >  --    < >  --     < > = values in this interval not displayed.       Imaging:  CT A/P:  IMPRESSION:  1. New wedge-shaped hypodensities within the posterior and inferior  right hepatic lobe and medial segment of the left hepatic lobe, most  likely represent areas of perfusion abnormality, possibly related to  sepsis. This could also be correlated with any episodes of  hypotension. Heterogeneous fatty change can also have this appearance  however felt less likely. There is also some heterogeneous perfusion  suspected in the kidneys.  2. Overall stable hypoattenuating left hepatic lobe lesion which  causes diffuse hepatic  biliary dilatation within the left hepatic  lobe, better characterized on prior MR from 2/11/2021. Chronic  occlusion left portal vein and involvement of the left hepatic artery  as discussed previously.  3. Persistent peripancreatic inflammatory changes, concerning for  pancreatitis. New discrete focal outpouching of the splenic artery  adjacent to the pancreas which may represent developing  pseudoaneurysm. There is also a partially loculated fluid collection  along the greater curvature of the stomach similar in size to prior  study which may represent developing pseudocyst.  4. Reactive inflammatory changes about the ascending colon with  associated borderline enlarged fluid-filled loops of small bowel,  concerning for reactive ileus.  5. Bilateral pleural effusions with associated compressive  atelectasis.  6. Increased diffuse ascites and anasarca consistent with overall  worsening third spacing of fluid.  7. No air containing collection.

## 2021-02-24 NOTE — PROGRESS NOTES
Mr Jenny seen and recent clinical course reviewed with his nurse and the ICU attending. While his pressor requirements are stable to improving, his ventilation remains a significant issue due to ARDS. Moreover, his lactate continues to rise perhaps in part due to a possible liver infaract and intravascular volume. He was pan scanned which was notable for the infarct and ongoing pancreatitis, the latter somewhat unchanged. There is now concern for abdominal compartment syndrome which will be tested with paralysis and bladder pressures.    I spoke to the daughter and stressed the critical nature of his illness. We will ask pathology for the formal results of the FNB.    Frandy Sanford MD PhD FACG BEV LUQUE  Director of Endoscopy  Associate Professor of Medicine, Surgery and Pediatrics  Interventional and Therapeutic Endoscopy    [facility]  Division of Gastroenterology and Hepatology  [facility] [address], [place] [address]    New Consultations  [phone]  Procedure Scheduling [phone]  Clinical Nurse Coordinator [phone]  Clinical Fax   [phone]  Administrative   [phone]  Administrative Fax  [phone]

## 2021-02-24 NOTE — PROGRESS NOTES
Antimicrobial Stewardship Team Note    Antimicrobial Stewardship Program - A joint venture between [facility] and  Physicians to optimize antibiotic management.  NOT a formal consult - Restricted Antimicrobial Review     Patient: Michelle Alvarado  MRN: [id number]  Allergies: Naproxen and Lisinopril    Brief Summary: Michelle Alvarado is a 66 year old male with a PMHx significant for left liver lobe mass c/f cholangiocarcinoma s/p EUS and ERCP with stent placement (02/19/2021), HTN, HLD, avascular necrosis of hip. He presented to the ED on 2/21/21 for evaluation sharp epigastric and right upper quadrant pain.     HPI: Patient reported new abdominal pain since his EUS/ERCP on 02/19. Endorsed nausea and two episodes of emesis with low grade fever and chills prior to admission. Reported decreased urine output and BM. Denied cough, sputum, shortness of breath, or other symptoms. Patient was on oral ciprofloxacin for post-procedural prophylaxis. Physical exam was notable for abdominal tenderness in the RUQ with no guarding or rigidity. Patient was hypotensive (BP 88/43), tachycardic ([id number]), afebrile and on RA. Labs significant for elevated Scr/BUN 3.63/45, hyponatremia (128), lactate 4.3, procalcitonin 39.74, , ALT 86,  alk phos 243, T bili 10.3, lipase 4183. Normal WBC (6.9). Patient was initiated on empiric Zosyn and vancomycin for sepsis likely 2/2 cholangitis. CT abdomen/pelvis with extensive peripancreatic inflammatory changes concerning for acute pancreatitis. Pneumobilia, likely related to recent ERCP with biliary stent and pancreatic duct stents in place. GI consulted and deferred repeat ERCP.    Shortly after admission, patient required intubation due to acute hypoxic respiratory failure. Patient continued to be hypotensive requiring multiple pressors and spiked a fever of 101. Lactate remained elevated, 10-13 today.  and CK 2273. Oliguric FANG, started on CRRT. Empiric fluconazole was  added and Zosyn was escalated to meropenem due to decompensation. WBC up trending, 25.8 today. Patient remains intubated with ARDS requiring proning and continued on CRRT, and pressors which is slowly being weaned off. Worsening LFTs and fluconazole switched to micafungin on 2/23. Blood culture NGTD x3 days and repeat BCx NGTD x1 day. COVID-19, flu A/B & RSV PCR are negative. Endotracheal sputum culture no growth.    Repeat CT abdomen/pelvis showed new wedge-shaped hypodensities within the posterior and inferior right hepatic lobe and medial segment of the left hepatic lobe, most likely represent areas of perfusion abnormality, possibly related to sepsis. Persistent peripancreatic inflammatory changes, concerning for pancreatitis. New discrete focal outpouching of the splenic artery adjacent to the pancreas which may represent developing pseudoaneurysm. Partially loculated fluid collection along the greater curvature of the stomach, may represent developing pseudocyst. Reactive ileus and increased diffuse ascites and anasarca consistent with overall worsening third spacing of fluid. No air containing collection.         Active Anti-infective Medications   (From admission, onward)                 Start     Stop    02/23/21 2230  micafungin (MYCAMINE) injection  100 mg,   Intravenous,   100 mL/hr,   EVERY 24 HOURS     Candidemia        --    02/23/21 0405  meropenem  1 g,   Intravenous,   EVERY 8 HOURS     Sepsis        --    02/22/21 2200  vancomycin 1500 mg  1,500 mg,   Intravenous,   EVERY 24 HOURS     Intra-Abdominal Infection        --                  Assessment: Post-ERCP pancreatitis with shock  66 year old male who presented with post-ERCP pancreatitis with shock currently on day 4 of broad spectrum antimicrobials (meropenem, vancomycin, and micafungin) due to concern for infection. Patient remains critically ill requiring multiple pressors, CRRT and ARDS. LFTs, lactate, WBC, and inflammatory markers remain  elevated. Blood cultures remain negative x3 days. No evidence of MRSA to warrant continuation of empiric vancomycin which is also not indicated in intraabdominal infections empirically. Meropenem is a restricted agent and should be reserved for history of or documented resistant GNR pathogens such as ESBL-producing Enterobacteriaceae or MDR Pseudomonas which this patient does not meet criteria for either. Inflammatory process due to pancreatitis is the most likely cause of multiorgan failure than an infectious process. Recommend stopping all antimicrobials at this time due to lack of evidence for infection. However, if there is an ongoing concern for infection due to pancreatitis could consider empiric ceftriaxone and Flagyl.    Recommendations:  Stop meropenem, vancomycin, and micafungin   Consider ceftriaxone 2g daily and Flagyl 500 mg every 8 hours if there is a concern for infected pancreatitis     Pharmacy took the following actions: Called/paged provider, Electronic note created.    Discussed with ID Staff: MD Kadi Coffman, PharmD, BCIDP  Pager: [phone]      Vital Signs/Clinical Features:  Vitals         02/22 0700  -  02/23 0659 02/23 0700  -  02/24 0659 02/24 0700  -  02/24 1241   Most Recent    Temp ( F) 97.7 -  101    95.9 -  97.6      96.6     96.6 (35.9)    Pulse 85 -  179    63 -  138    61 -  69     61    Resp 18 -  31    28 -  31    28 -  29     28    BP 72/59 -  113/96      106/78      112/44     112/44    SpO2 (%) 79 -  100    84 -  100    93 -  100     100            Labs  Estimated Creatinine Clearance: 38.7 mL/min (A) (based on SCr of 1.76 mg/dL (H)).  Recent Labs   Lab Test 02/23/21  0445 02/23/21  0927 02/23/21  1512 02/23/21  2123 02/24/21  0421 02/24/21  1002   CR 2.37* 2.16* 2.05* 1.89* 1.85* 1.76*       Recent Labs   Lab Test 02/12/21  1115 02/21/21  1855 02/22/21  0552 02/23/21  0445 02/24/21  0421   WBC 6.3 6.9 4.1 14.2* 25.8*   ANEU 4.3 3.6  --   --   --    ALYM 1.2  1.3  --   --   --    NAYE 0.8 0.9  --   --   --    AEOS 0.0 0.0  --   --   --    HGB 13.5 11.8* 12.7* 9.8* 9.5*   HCT 41.2 34.4* 38.7* 27.7* 27.7*   MCV 95 91 96 87 92    339 329 222 169       Recent Labs   Lab Test 02/12/21  1115 02/21/21  1855 02/22/21  0552 02/22/21  0920 02/23/21  0445 02/24/21  0421   BILITOTAL 5.1* 10.3* 8.6* 7.5* 7.6* 9.2*   ALKPHOS 413* 243* 161* 133 126 291*   ALBUMIN 3.3* 2.2* 1.7* 1.4* 1.1* 1.1*   * 112* 139* 139* 2,194* 5,930*   * 86* 80* 76* 1,020* 2,123*       Recent Labs   Lab Test 02/21/21  1855 02/21/21  1855 02/22/21  0920 02/22/21  0920 02/23/21  0734 02/23/21  0734 02/23/21  1825 02/23/21  2123 02/24/21  0101 02/24/21  0421 02/24/21  0850 02/24/21  1208   PCAL 39.74*  --   --   --   --   --   --   --   --   --   --   --    LACT 4.3*   < > 5.5*  5.2*   < >  --    < > 8.2* 10.1* 10.6* 12.0* 13.4* 12.4*   CRP  --   --  310.0*  --  350.0*  --   --   --   --   --   --   --     < > = values in this interval not displayed.       Recent Labs   Lab Test 02/22/21  1813   VANCOMYCIN 11.6       Culture Results:  7-Day Micro Results       Procedure Component Value Units Date/Time    Blood culture [[id number]] Collected: 02/23/21 0409    Order Status: Completed Lab Status: Preliminary result Updated: 02/24/21 0724    Specimen: Blood      Specimen Description Blood Left Hand     Special Requests Received in aerobic bottle only     Culture Micro No growth after 1 day    Blood culture [[id number]] Collected: 02/23/21 0358    Order Status: Completed Lab Status: Preliminary result Updated: 02/24/21 0724    Specimen: Blood      Specimen Description Blood Left Arm     Special Requests Received in aerobic bottle only     Culture Micro No growth after 1 day    Sputum Culture Aerobic Bacterial [[id number]] Collected: 02/22/21 1243    Order Status: Completed Lab Status: Final result Updated: 02/24/21 0828    Specimen: Sputum      Specimen Description Sputum Endotracheal     Culture Micro No  growth    Gram stain [[id number]] Collected: 02/22/21 1243    Order Status: Completed Lab Status: Final result Updated: 02/22/21 1714    Specimen: Sputum      Specimen Description Sputum Endotracheal     Gram Stain <25 PMNs/low power field      No organisms seen    Blood culture [[id number]] Collected: 02/21/21 1936    Order Status: Completed Lab Status: Preliminary result Updated: 02/24/21 0253    Specimen: Blood from Arm, Forearm Only, Right      Specimen Description Blood Right Hand     Culture Micro No growth after 3 days    Blood culture [[id number]] Collected: 02/21/21 1936    Order Status: Completed Lab Status: Preliminary result Updated: 02/24/21 0253    Specimen: Blood from Hand, Right      Specimen Description Blood Right Arm     Culture Micro No growth after 3 days            Recent Labs   Lab Test 02/22/21  0246   URINEPH 5.5   NITRITE Negative   LEUKEST Negative   WBCU 8*                         Imaging: Ct Chest/abdomen/pelvis W Contrast    Result Date: 2/24/2021  EXAMINATION: CT CHEST/ABDOMEN/PELVIS W CONTRAST  2/23/2021 11:43 PM  CLINICAL HISTORY: Sepsis COMPARISON: CT abdomen and pelvis 2/21/2021, MR to 1121 and CT chest 2/11/2021    PROCEDURE COMMENTS: CT of the chest, abdomen, and pelvis was performed with 73 ml isovue 370  intravenous and contrast. Axial MIP  images of the chest, and coronal and sagittal reformatted images of the chest, abdomen, and pelvis obtained. FINDINGS:  Chest: Endotracheal tube in the midthoracic trachea. Central tracheobronchial tree is patent. Moderate bilateral pleural effusions with associated compressive atelectasis, worsened from prior studies. No pneumothorax. Biapical scarring. No focal airspace consolidation in the aerated portions. Calcified granuloma in the left upper lobe. The heart size is normal. Physiological pericardial fluid. No central pulmonary embolism. Severe coronary artery calcifications. Normal caliber main pulmonary artery and thoracic aorta. Conventional 3  vessel branching aortic arch. Scattered prominent but nonenlarged mediastinal lymph nodes. No axillary lymphadenopathy. Abdomen/pelvis: Wedge-shaped hypodensity within the posterior right hepatic lobe with vessels coursing through this region. Additionally regular hypodensity in the posterior inferior right hepatic lobe, series 11 image 333. A 3.4 x 2.7 cm hypoattenuating area in the left hepatic lobe is demonstrated, series 11 image 284. This appears anterior to the exact site of the patient's known cholangiocarcinoma on recent MRI which is at the confluence of the dilated ducts, approximately 2.5 x 2.7 cm, series 11 image 270. This again causes left-sided biliary obstruction with intrahepatic biliary dilatation in the left hepatic lobe. The pneumobilia on prior CT study is no longer identified. This mass appears to encase the left hepatic artery. 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 Left hepatic lobe overall appears hypoperfused/hyperattenuating compared to the right hepatic lobe. Hepatic veins not well evaluated due to slightly early phase of scanning, nonopacified. Common bile duct stent extending into the intrahepatic biliary ducts. Pancreatic duct stent also in place. Contrast opacifies a nondistended gallbladder. Extensive inflammatory changes about the pancreas. Focal dilatation of the splenic artery adjacent to the pancreas may represent pseudoaneurysm formation (series 11 image 302). No discrete fluid collection. The spleen and adrenal glands are normal in appearance. Simple right renal cyst. Slightly heterogeneous perfusion of the kidneys with the catheter ends area for example medially in the mid left kidney, series 11 image 317. No hydronephrosis or nephrolithiasis. The urinary bladder is decompressed and contains a Ramirez catheter. Diffuse intra-abdominal ascites. There is a slightly loculated fluid collection along the greater curvature of the stomach measuring 4.1 x 3.2 cm, series 11 image 273,  similar to prior CT but new from prior MRI. This may represent a developing pseudocyst. No free intraperitoneal air. Enteric tube in the proximal duodenum. Multiple prominent and borderline enlarged fluid-filled loops of small bowel. No pneumatosis or portal venous gas. Oral contrast is seen within a nondilated appendix. Multiple thickening around the ascending and transverse colon adjacent to the pancreas, likely reactive. Infrarenal aorta is of normal caliber. Aortoiliac atherosclerotic calcifications. Scattered prominent mesenteric lymph nodes. Stable enlarged elizabeth hepatis lymph nodes. Diffuse anasarca. Ramirez catheter in a collapsed bladder with some air from catheter insertion. Normal prostate. Bones: Multilevel degenerative changes of the spine. Sequelae of avascular necrosis of the bilateral femoral heads. Irregularity with small cortical defect along the left femoral head, series 13 image 60 stable. No suspicious or aggressive appearing bone lesions.     IMPRESSION: 1. New wedge-shaped hypodensities within the posterior and inferior right hepatic lobe and medial segment of the left hepatic lobe, most likely represent areas of perfusion abnormality, possibly related to sepsis. This could also be correlated with any episodes of hypotension. Heterogeneous fatty change can also have this appearance however felt less likely. There is also some heterogeneous perfusion suspected in the kidneys. 2. Overall stable hypoattenuating left hepatic lobe lesion which causes diffuse hepatic biliary dilatation within the left hepatic lobe, better characterized on prior MR from 2/11/2021. Chronic occlusion left portal vein and involvement of the left hepatic artery as discussed previously. 3. Persistent peripancreatic inflammatory changes, concerning for pancreatitis. New discrete focal outpouching of the splenic artery adjacent to the pancreas which may represent developing pseudoaneurysm. There is also a partially loculated  fluid collection along the greater curvature of the stomach similar in size to prior study which may represent developing pseudocyst. 4. Reactive inflammatory changes about the ascending colon with associated borderline enlarged fluid-filled loops of small bowel, concerning for reactive ileus. 5. Bilateral pleural effusions with associated compressive atelectasis. 6. Increased diffuse ascites and anasarca consistent with overall worsening third spacing of fluid. 7. No air containing collection. Imaging findings discussed with Dr. Prather by Dr. Arturo Hernandez at 12:30AM on 21. I have personally reviewed the examination and initial interpretation and I agree with the findings. ANDERSON GUTIERREZ MD    Echo Complete    Result Date: 2021  [id number]^BESSY^VANNA    [facility] [address] [place] [id number]  Name: GORDY CMAP MRN: [id number] : 1955 Study Date: 2021 11:56 AM Age: 66 yrs Gender: Male Patient Location: [facility] [place] Reason For Study: Other, Please Specify in Comments Ordering Physician: VANNA DOHERTY Performed By: Alyssa Norris RDCS  BSA: 1.7 m2 Height: 63 in Weight: 151 lb HR: 104 BP: 106/51 mmHg _____________________________________________________________________________ __   Procedure Complete Portable Echo Adult. Contrast Optison. Patient was given 5 ml mixture of 3 ml Optison and 6 ml saline. 4 ml wasted. _____________________________________________________________________________ __   Interpretation Summary The Ejection Fraction was calculated using Bi-plane contrast at 35% and visually is estimated at 30-35% with moder diffuse hypokinesis normal size and wall thickness.  Global right ventricular function is mildly to moderately reduced  Trivial pericardial effusion is present.  No significant valvular abnormalities present.  There is no prior study for direct comparison.  _____________________________________________________________________________ __   Left Ventricle Left ventricular wall thickness is normal. Left ventricular size is normal. The Ejection Fraction is estimated at 30-35%. Left ventricular diastolic function is normal. The Ejection Fraction was calculated using Bi-plane contrast. Moderate diffuse hypokinesis is present.  Right Ventricle Right ventricular wall thickness is normal. Global right ventricular function is mildly to moderately reduced.  Atria The atrial septum is intact as assessed by color Doppler .  Mitral Valve The mitral valve is normal. Trace mitral insufficiency is present.   Aortic Valve Aortic valve is normal in structure and function. The aortic valve is tricuspid.  Tricuspid Valve Trace tricuspid insufficiency is present. The right ventricular systolic pressure is approximated at 9.6 mmHg plus the right atrial pressure.  Pulmonic Valve pulmonic valve is not well vizualized.  Vessels Sinuses of Valsalva 3.1 cm. Ascending aorta 2.8 cm. IVC diameter <2.1 cm collapsing >50% with sniff suggests a normal RA pressure of 3 mmHg.  Pericardium Prominent epicardial fat is noted. Trivial pericardial effusion is present.   Miscellaneous No significant valvular abnormalities present.  Compared to Previous Study There is no prior study for direct comparison. _____________________________________________________________________________ __  MMode/2D Measurements & Calculations  EF(MOD-bp): 34.3 % LA Volume Index (BP): 21.6 ml/m2 TAPSE: 1.2 cm   Doppler Measurements & Calculations MV E max oseas: 79.6 cm/sec MV A max oseas: 122.6 cm/sec MV E/A: 0.65 MV dec slope: 776.6 cm/sec2 MV dec time: 0.10 sec  PA acc time: 0.09 sec TR max oseas: 154.8 cm/sec TR max P.6 mmHg E/E' av.7 Lateral E/e': 14.1 Medial E/e': 15.3  _____________________________________________________________________________ __    Report approved by: Awais Jacobs 2021 01:36 PM      Xr  Chest Port 1 View    Result Date: 2/23/2021  EXAM: XR CHEST PORT 1 VW  2/23/2021 8:33 AM HISTORY:  worsening oxygenation requirement   COMPARISON:  Chest x-ray 2/22/2021 FINDINGS: Semiupright portable AP view of the chest. Endotracheal tube, right IJ central venous catheter, left IJ central venous catheter, and enteric tube are in similar position. Trachea is midline. The cardiac mediastinal silhouette is within normal limits. Increased left greater than right mixed interstitial and airspace opacities with slightly worsened bibasilar opacities. No pneumothorax is appreciated. Visualized upper abdomen is unremarkable.     IMPRESSION: 1. Increased left greater than right mixed interstitial and airspace opacities with slightly worsened left greater than right bibasilar effusions. Likely representing worsened pulmonary edema. 2. Stable position of support devices. I have personally reviewed the examination and initial interpretation and I agree with the findings. ZACHERY JARAMILLO MD    Xr Chest Port 1 View    Result Date: 2/22/2021  EXAM: XR CHEST PORT 1 VW  2/22/2021 3:55 PM HISTORY:  RIJ dialysis placement   COMPARISON:  Chest x-ray 2/22/2021 at 10:01 AM FINDINGS: Semiupright portable AP view of the chest. Interval placement of right IJ central venous catheter with tip projecting in the low SVC. Endotracheal tube tip in similar position in the midthoracic trachea. Enteric tube passes below the left hemidiaphragm and out of field of view. Left IJ central venous catheter with tip projecting in the mid SVC. Trachea is midline. Cardiac mediastinal silhouette is within normal limits. No significant change in perihilar opacities with mixed consolidative and airspace opacities at the left greater than right lung bases. No pneumothorax is appreciated. Visualized abdomen is unremarkable.     IMPRESSION: 1. Interval placement of right IJ dialysis catheter with tip projected in the low SVC. Stable position of other support  devices. 2. No significant change in mixed interstitial and airspace opacities, left greater than right, with left basilar pleural effusion. I have personally reviewed the examination and initial interpretation and I agree with the findings. KALLIE ARTIS MD    Xr Chest Port 1 View    Result Date: 2/22/2021  EXAM: XR CHEST PORT 1 VW  2/22/2021 10:46 AM HISTORY:  ETT and line placement   COMPARISON:  Chest CT 2/11/2021, abdomen and pelvis CT 2/21/2021. FINDINGS: AP radiograph of the chest. Endotracheal tube projecting over the midthoracic trachea. Left IJ central venous catheter with tip overlying the mid SVC. Enteric tube coursing inferior to the diaphragm with the tip outside the field of view. Cardiomediastinal silhouette is within normal limits. Small left and trace right pleural effusions. No pneumothorax. Perihilar opacities with mixed consolidative and airspace opacities at the left greater than right lung base. The visualized upper abdomen is unremarkable. No acute osseous abnormality.     IMPRESSION: 1. Endotracheal tube with tip overlying the mid thoracic trachea approximately 4 cm cephalad to the altagracia. 2. Left IJ central venous catheter with tip overlying the mid SVC. No pneumothorax. 3. Mixed interstitial and airspace opacities at the lung bases with small left pleural effusion. Findings possibly representing atypical infection versus pulmonary edema. I have personally reviewed the examination and initial interpretation and I agree with the findings. ZACHERY JARAMILLO MD    Xr Surgery Radha Fluoro L/t 5 Min W Stills    Result Date: 2/19/2021  This exam was marked as non-reportable because it will not be read by a radiologist or a [place] non-radiologist provider.     Xr Abdomen 1 View    Result Date: 2/22/2021  Exam: XR ABDOMEN 1 VW, 2/22/2021 10:45 AM Indication: OG placement Comparison: CT abdomen and pelvis 2/21/2021 Findings: Portable supine abdominal radiograph. OG tube tip in the region of the  descending duodenum with side hole towards the pylorus. Plastic biliary and pancreatic stents in place. Scattered gas-filled loops of bowel demonstrated some of which appear to be small bowel loops, measuring up to 4 cm, nonspecific. There is gas present to the rectosigmoid colon. Pattern was nonobstructed on CT. Degenerative changes. Limited assessment for free air on supine imaging.     Impression: OG tube tip postpyloric in the proximal duodenum. Biliary and pancreatic stents remain in place. ANDERSON GUTIERREZ MD    Ct Abdomen Pelvis W/o Contrast    Result Date: 2/21/2021  EXAMINATION: CT ABDOMEN PELVIS W/O CONTRAST  2/21/2021 8:26 PM  CLINICAL HISTORY: RUQ pain and chills after ERCP and stenting. Cholangiocarcinoma COMPARISON: Abdominal MR 2/11/2021 and CT chest 2/11/2021    PROCEDURE COMMENTS: CT of the abdomen and pelvis was obtained without contrast. Coronal and sagittal formatted images were obtained. FINDINGS:  Lower Thorax: Small left pleural effusion with associated compressive atelectasis. Trace right pleural effusion. Bibasilar opacities. Abdomen/pelvis: 3.0 x 2.8 cm hypoattenuating lesion within the left hepatic lobe with diffuse intrahepatic biliary dilatation throughout the left hepatic lobe. Pneumobilia with common bile duct stent extending into the intrahepatic biliary ducts. Pancreatic duct stent also in place. There is extensive fat stranding about the pancreas and mesentery without focal fluid collection. Small amount of retained contrast in the gallbladder. The spleen, adrenals, and and kidneys are normal in appearance. Stable simple right renal cysts. Urinary bladder appears unremarkable. Few prominent fluid-filled loops of small bowel. Moderate colonic stool burden with fecalization of distal small bowel loops. There is wall thickening of the colon as it courses near the pancreas, likely reactive. The appendix is normal. No free intraperitoneal air. Small amount of free fluid within the  pelvis. The infrarenal aorta is of normal caliber. Aortoiliac atherosclerotic calcifications. Multiple prominent and borderline enlarged mesenteric lymph nodes. Enlarged elizabeth hepatis lymph node measuring up to 12 mm. Bones: Multilevel degenerative changes of the spine. Sequelae of avascular necrosis of the bilateral femoral heads. Ankylosis of bilateral sacroiliac joints. No suspicious or aggressive appearing bone lesions.     IMPRESSION: 1. Extensive peripancreatic inflammatory changes concerning for acute pancreatitis. 2. 3.0 x 2.8 cm hypoattenuating lesion within the left hepatic lobe with diffuse hepatic biliary dilatation in the left hepatic lobe, better characterized on prior MR from 2/11/2021. 3. Pneumobilia, likely related to recent ERCP with biliary stent and pancreatic duct stents in place. 4. Small left and trace right pleural effusions with overlying atelectasis and consolidation. I have personally reviewed the examination and initial interpretation and I agree with the findings. RANDALL MCNAIR MD    Ct Head W/o Contrast    Result Date: 2/24/2021  CT HEAD W/O CONTRAST 2/23/2021 11:42 PM History: Cerebral hemorrhage suspected Comparison: None Technique: Using multidetector thin collimation helical acquisition technique, axial, coronal and sagittal CT images from the skull base to the vertex were obtained without intravenous contrast. Findings: There is no intracranial hemorrhage, mass effect, or midline shift. Mild to moderate cerebral parenchymal loss. Scattered periventricular and subcortical white matter hypodensities, likely sequela of chronic small vessel ischemic disease. Gray/white matter differentiation in both cerebral hemispheres is preserved. Ventricles are proportionate to the cerebral sulci. The basal cisterns are clear. The orbits appear unremarkable. The bony calvaria and the bones of the skull base are normal. The mastoid air cells are clear. Trace mucosal thickening of the  paranasal sinuses..     Impression:  No acute intracranial pathology. No evidence of intracranial hemorrhage. I have personally reviewed the examination and initial interpretation and I agree with the findings. RANDAL RETANA MD

## 2021-02-24 NOTE — DEATH PRONOUNCEMENT
MD DEATH PRONOUNCEMENT    Called to pronounce Michelle Alvarado dead.    Physical Exam: Unresponsive to noxious stimuli, Spontaneous respirations absent, Breath sounds absent, Heart sounds absent, Pupillary light reflex absent and Corneal blink reflex absent    Patient was pronounced dead at 5:43 PM, 2021.    Preliminary Cause of Death: pancreatitis, AHRF, FANG, stress cardiomyopathy    Active Problems:    Cholangitis    Cholangiocarcinoma (H)    Acute renal failure, unspecified acute renal failure type (H)    Other acute pancreatitis, unspecified complication status    Acute kidney failure with tubular necrosis (H)       Infectious disease present?: NO    Communicable disease present? (examples: HIV, chicken pox, TB, Ebola, CJD) :  NO    Multi-drug resistant organism present? (example: MRSA): NO    Please consider an autopsy if any of the following exist:  NO Unexpected or unexplained death during or following any dental, medical, or surgical diagnostic treatment procedures.   NO Death of mother at or up to seven days after delivery.     NO All  and pediatric deaths.     NO Death where the cause is sufficiently obscure to delay completion of the death certificate.   NO Deaths in which autopsy would confirm a suspected illness/condition that would affect surviving family members or recipients of transplanted organs.     The following deaths must be reported to the 's Office:  NO A death that may be due entirely or in part to any factors other than natural disease (recent surgery, recent trauma, suspected abuse/neglect).   NO A death that may be an accident, suicide, or homicide.     NO Any sudden, unexpected death in which there is no prior history of significant heart disease or any other condition associated with sudden death.   NO A death under suspicious, unusual, or unexpected circumstances.    NO Any death which is apparently due to natural causes but in which the  does not have  a personal physician familiar with the patient s medical history, social, or environmental situation or the circumstances of the terminal event.   NO Any death apparently due to Sudden Infant Death Syndrome.     NO Deaths that occur during, in association with, or as consequences of a diagnostic, therapeutic, or anesthetic procedure.   NO Any death in which a fracture of a major bone has occurred within the past (6) six months.   NO A death of persons note seen by their physician within 120 days of demise.     NO Any death in which the  was an inmate of a public institution or was in the custody of Law Enforcement personnel.   NO  All unexpected deaths of children   NO Solid organ donors   NO Unidentified bodies   NO Deaths of persons whose bodies are to be cremated or otherwise disposed of so that the bodies will later be unavailable for examination;   NO Deaths unattended by a physician outside of a licensed healthcare facility or licensed residential hospice program   NO Deaths occurring within 24 hours of arrival to a health care facility if death is unexpected.    NO Deaths associated with the decedent s employment.   NO Deaths attributed to acts of terrorism.   NO Any death in which there is uncertainty as to whether it is a medical examiner s care should be discussed with the medical investigator.        Body disposition: Autopsy was discussed with family member:  Significant other and Daughter in person.  Permission for autopsy was declined.

## 2021-02-25 ASSESSMENT — ACTIVITIES OF DAILY LIVING (ADL): ADLS_ACUITY_SCORE: 21

## 2021-02-25 NOTE — PLAN OF CARE
Death note    Changes this shift: Transitioned to comfort cares. CRRT rinsed back at 1657.  Extubated to RA  at 1719, pt  at 1743 with family and the [facility] at BS.  Plan:  Security's number given to Cindy (pt's daughter) family remains at BS and would like to remain at BS for 8 hours post mortem.   Will continue to evaluate.

## 2021-02-25 NOTE — DISCHARGE SUMMARY
[facility]    Death Summary - Medicine & Pediatrics    Date of Admission:  2/21/2021  Date of Death: 2/24/2021  Attending Provider: Montez Matt  Service: Medical ICU 2    Discharge Diagnoses   Post-ERCP pancreatitis  Acute hypoxic respiratory failure  Acute kidney injury  Shock liver  Stress cardiomyopathy  Multisystem organ failure     Cause of death: Post-ERCP pancreatitis, Acute hypoxic respiratory failure, Acute kidney injury, Shock liver, Stress cardiomyopathy    Hospital Course   Michelle Alvarado is a 66 year old with a left liver lobe mass concerning for cholangiocarcinoma who was admitted on 2/21/2021 for sharp epigastric and RUQ pain with nausea and emesis consistent with post-ERCP pancreatitis after a recent EUS and challenging ERCP on 2/19. He was found to be in distributive shock due to severe pancreatitis requiring medical ICU admission. He had acute hypoxic respiratory failure with acute respiratory distress syndrome requiring intubation, anuric acute kidney injury requiring CRRT, shock liver, and stress cardiomyopathy (LVEF 30-35%). He was also empirically started on broad spectrum antibiotics. Patient continued to have rapid worsening of his multisystem organ failure despite maximal life support. 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 Discussed with patient's daughter and spouse (POA) his critical condition, and on 2/24, decision was made to pursue comfort care. Patient passed away peacefully with family and a  at bedside at 5:43 PM 2/24 evening.     Luis Alfredo Bartlett MD  [facility]    ______________________________________________________________________      Significant Results and Procedures      2/19/21 FNA Results  CYTOLOGIC INTERPRETATION:     A. Lymph node, periportal ,  endoscopic ultrasound guided fine needle   aspiration:   - Negative for malignancy   - Background lymphoid population present.   Specimen Adequacy: Satisfactory for evaluation.     B. Liver, left lobe mass , endoscopic ultrasound guided fine needle   aspiration:   - Positive for malignancy.   - Adenocarcinoma, see comment.   Specimen Adequacy: Satisfactory for evaluation.     COMMENT:   Immunohistochemical stains are performed with appropriate control   reactions on the cell block material. The   tumor cells are positive for CK7, CK19 while negative for CK20 supporting   a primary from upper   pancreato-biliary tract.     Results for orders placed or performed during the hospital encounter of 02/21/21   CT Abdomen Pelvis w/o Contrast    Narrative    EXAMINATION: CT ABDOMEN PELVIS W/O CONTRAST  2/21/2021 8:26 PM      CLINICAL HISTORY: RUQ pain and chills after ERCP and stenting.  Cholangiocarcinoma    COMPARISON: Abdominal MR 2/11/2021 and CT chest 2/11/2021        PROCEDURE COMMENTS: CT of the abdomen and pelvis was obtained without  contrast. Coronal and sagittal formatted images were obtained.     FINDINGS:    Lower Thorax:  Small left pleural effusion with associated compressive atelectasis.  Trace right pleural effusion. Bibasilar opacities.    Abdomen/pelvis:  3.0 x 2.8 cm hypoattenuating lesion within the left hepatic lobe with  diffuse intrahepatic biliary dilatation throughout the left hepatic  lobe. Pneumobilia with common bile duct stent extending into the  intrahepatic biliary ducts. Pancreatic duct stent also in place. There  is extensive fat stranding about the pancreas and mesentery without  focal fluid collection. Small amount of retained contrast in the  gallbladder. The spleen, adrenals, and and kidneys are normal in  appearance. Stable simple right renal cysts. Urinary bladder appears  unremarkable. Few prominent fluid-filled loops of small bowel.  Moderate colonic stool burden with fecalization of  distal small bowel  loops. There is wall thickening of the colon as it courses near the  pancreas, likely reactive. The appendix is normal. No free  intraperitoneal air. Small amount of free fluid within the pelvis. The  infrarenal aorta is of normal caliber. Aortoiliac atherosclerotic  calcifications. Multiple prominent and borderline enlarged mesenteric  lymph nodes. Enlarged elizabeth hepatis lymph node measuring up to 12 mm.     Bones:   Multilevel degenerative changes of the spine. 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 Ankylosis of bilateral  sacroiliac joints. No suspicious or aggressive appearing bone lesions.      Impression    IMPRESSION:  1. Extensive peripancreatic inflammatory changes concerning for acute  pancreatitis.  2. 3.0 x 2.8 cm hypoattenuating lesion within the left hepatic lobe  with diffuse hepatic biliary dilatation in the left hepatic lobe,  better characterized on prior MR from 2/11/2021.  3. Pneumobilia, likely related to recent ERCP with biliary stent and  pancreatic duct stents in place.  4. Small left and trace right pleural effusions with overlying  atelectasis and consolidation.    I have personally reviewed the examination and initial interpretation  and I agree with the findings.    RANDALL MCNAIR MD   XR Chest Port 1 View    Narrative    EXAM: XR CHEST PORT 1 VW  2/22/2021 10:46 AM     HISTORY:  ETT and line placement       COMPARISON:  Chest CT 2/11/2021, abdomen and pelvis CT 2/21/2021.    FINDINGS: AP radiograph of the chest. Endotracheal tube projecting  over the midthoracic trachea. Left IJ central venous catheter with tip  overlying the mid SVC. Enteric tube coursing inferior to the diaphragm  with the tip outside the field of view.    Cardiomediastinal silhouette is within normal limits. Small left and  trace right pleural effusions. No pneumothorax. Perihilar opacities  with mixed consolidative and airspace opacities at the left greater  than  right lung base. The visualized upper abdomen is unremarkable. No  acute osseous abnormality.      Impression    IMPRESSION:  1. Endotracheal tube with tip overlying the mid thoracic trachea  approximately 4 cm cephalad to the altagracia.  2. Left IJ central venous catheter with tip overlying the mid SVC. No  pneumothorax.  3. Mixed interstitial and airspace opacities at the lung bases with  small left pleural effusion. Findings possibly representing atypical  infection versus pulmonary edema.    I have personally reviewed the examination and initial interpretation  and I agree with the findings.    ZACHERY JARAMILLO MD   XR Abdomen 1 View    Narrative    Exam: XR ABDOMEN 1 VW, 2/22/2021 10:45 AM    Indication: OG placement    Comparison: CT abdomen and pelvis 2/21/2021    Findings:   Portable supine abdominal radiograph. OG tube tip in the region of the  descending duodenum with side hole towards the pylorus. Plastic  biliary and pancreatic stents in place. Scattered gas-filled loops of  bowel demonstrated some of which appear to be small bowel loops,  measuring up to 4 cm, nonspecific. There is gas present to the  rectosigmoid colon. Pattern was nonobstructed on CT. Degenerative  changes. Limited assessment for free air on supine imaging.      Impression    Impression: OG tube tip postpyloric in the proximal duodenum. Biliary  and pancreatic stents remain in place.    ANDERSON GUTIERREZ MD   XR Chest Port 1 View    Narrative    EXAM: XR CHEST PORT 1 VW  2/22/2021 3:55 PM     HISTORY:  RIJ dialysis placement       COMPARISON:  Chest x-ray 2/22/2021 at 10:01 AM    FINDINGS:   Semiupright portable AP view of the chest. Interval placement of right  IJ central venous catheter with tip projecting in the low SVC.  Endotracheal tube tip in similar position in the midthoracic trachea.  Enteric tube passes below the left hemidiaphragm and out of field of  view. Left IJ central venous catheter with tip projecting in the  mid  SVC.    Trachea is midline. Cardiac mediastinal silhouette is within normal  limits. No significant change in perihilar opacities with mixed  consolidative and airspace opacities at the left greater than right  lung bases. No pneumothorax is appreciated. Visualized abdomen is  unremarkable.      Impression    IMPRESSION:     1. Interval placement of right IJ dialysis catheter with tip projected  in the low SVC. Stable position of other support devices.  2. No significant change in mixed interstitial and airspace opacities,  left greater than right, with left basilar pleural effusion.    I have personally reviewed the examination and initial interpretation  and I agree with the findings.    KALLIE ARTIS MD   XR Chest Port 1 View    Narrative    EXAM: XR CHEST PORT 1 VW  2/23/2021 8:33 AM     HISTORY:  worsening oxygenation requirement       COMPARISON:  Chest x-ray 2/22/2021    FINDINGS:   Semiupright portable AP view of the chest. Endotracheal tube, right IJ  central venous catheter, left IJ central venous catheter, and enteric  tube are in similar position. Trachea is midline. The cardiac  mediastinal silhouette is within normal limits.  Increased left greater than right mixed interstitial and airspace  opacities with slightly worsened bibasilar opacities. No pneumothorax  is appreciated. Visualized upper abdomen is unremarkable.      Impression    IMPRESSION:     1. Increased left greater than right mixed interstitial and airspace  opacities with slightly worsened left greater than right bibasilar  effusions. Likely representing worsened pulmonary edema.  2. Stable position of support devices.    I have personally reviewed the examination and initial interpretation  and I agree with the findings.    ZACHERY JARAMILLO MD   CT Head w/o Contrast    Narrative    CT HEAD W/O CONTRAST 2/23/2021 11:42 PM    History: Cerebral hemorrhage suspected     Comparison: None    Technique: Using multidetector thin  collimation helical acquisition  technique, axial, coronal and sagittal CT images from the skull base  to the vertex were obtained without intravenous contrast.    Findings: There is no intracranial hemorrhage, mass effect, or midline  shift. Mild to moderate cerebral parenchymal loss. Scattered  periventricular and subcortical white matter hypodensities, likely  sequela of chronic small vessel ischemic disease. Gray/white matter  differentiation in both cerebral hemispheres is preserved. Ventricles  are proportionate to the cerebral sulci. The basal cisterns are clear.  The orbits appear unremarkable.    The bony calvaria and the bones of the skull base are normal. The  mastoid air cells are clear. Trace mucosal thickening of the paranasal  sinuses..       Impression    Impression:  No acute intracranial pathology. No evidence of  intracranial hemorrhage.    I have personally reviewed the examination and initial interpretation  and I agree with the findings.    RANDAL RETANA MD   CT Chest/Abdomen/Pelvis w Contrast    Narrative    EXAMINATION: CT CHEST/ABDOMEN/PELVIS W CONTRAST  2/23/2021 11:43 PM      CLINICAL HISTORY: Sepsis    COMPARISON: CT abdomen and pelvis 2/21/2021, MR to 1121 and CT chest  2/11/2021        PROCEDURE COMMENTS: CT of the chest, abdomen, and pelvis was performed  with 73 ml isovue 370  intravenous and contrast. Axial MIP  images of  the chest, and coronal and sagittal reformatted images of the chest,  abdomen, and pelvis obtained.    FINDINGS:    Chest:  Endotracheal tube in the midthoracic trachea. Central tracheobronchial  tree is patent. Moderate bilateral pleural effusions with associated  compressive atelectasis, worsened from prior studies. No pneumothorax.  Biapical scarring. No focal airspace consolidation in the aerated  portions. Calcified granuloma in the left upper lobe. The heart size  is normal. Physiological pericardial fluid. No central pulmonary  embolism. Severe coronary  artery calcifications. Normal caliber main  pulmonary artery and thoracic aorta. Conventional 3 vessel branching  aortic arch. Scattered prominent but nonenlarged mediastinal lymph  nodes. No axillary lymphadenopathy.    Abdomen/pelvis:  Wedge-shaped hypodensity within the posterior right hepatic lobe with  vessels coursing through this region. Additionally regular hypodensity  in the posterior inferior right hepatic lobe, series 11 image 333. A  3.4 x 2.7 cm hypoattenuating area in the left hepatic lobe is  demonstrated, series 11 image 284. This appears anterior to the exact  site of the patient's known cholangiocarcinoma on recent MRI which is  at the confluence of the dilated ducts, approximately 2.5 x 2.7 cm,  series 11 image 270. This again causes left-sided biliary obstruction  with intrahepatic biliary dilatation in the left hepatic lobe. The  pneumobilia on prior CT study is no longer identified. This mass  appears to encase the left hepatic artery. 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 Left hepatic lobe overall appears  hypoperfused/hyperattenuating compared to the right hepatic lobe.  Hepatic veins not well evaluated due to slightly early phase of  scanning, nonopacified.    Common bile duct stent extending into the intrahepatic biliary ducts.  Pancreatic duct stent also in place. Contrast opacifies a nondistended  gallbladder. Extensive inflammatory changes about the pancreas. Focal  dilatation of the splenic artery adjacent to the pancreas may  represent pseudoaneurysm formation (series 11 image 302). No discrete  fluid collection. The spleen and adrenal glands are normal in  appearance. Simple right renal cyst. Slightly heterogeneous perfusion  of the kidneys with the catheter ends area for example medially in the  mid left kidney, series 11 image 317. No hydronephrosis or  nephrolithiasis. The urinary bladder is decompressed and contains a  Ramirez catheter. Diffuse intra-abdominal ascites. There  is a slightly  loculated fluid collection along the greater curvature of the stomach  measuring 4.1 x 3.2 cm, series 11 image 273, similar to prior CT but  new from prior MRI. This may represent a developing pseudocyst. No  free intraperitoneal air. Enteric tube in the proximal duodenum.  Multiple prominent and borderline enlarged fluid-filled loops of small  bowel. No pneumatosis or portal venous gas. Oral contrast is seen  within a nondilated appendix. Multiple thickening around the ascending  and transverse colon adjacent to the pancreas, likely reactive.  Infrarenal aorta is of normal caliber. Aortoiliac atherosclerotic  calcifications. Scattered prominent mesenteric lymph nodes. Stable  enlarged elizabeth hepatis lymph nodes. Diffuse anasarca. Ramirez catheter  in a collapsed bladder with some air from catheter insertion. Normal  prostate.    Bones:   Multilevel degenerative changes of the spine. 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 Irregularity with small  cortical defect along the left femoral head, series 13 image 60  stable. No suspicious or aggressive appearing bone lesions.      Impression    IMPRESSION:  1. New wedge-shaped hypodensities within the posterior and inferior  right hepatic lobe and medial segment of the left hepatic lobe, most  likely represent areas of perfusion abnormality, possibly related to  sepsis. This could also be correlated with any episodes of  hypotension. Heterogeneous fatty change can also have this appearance  however felt less likely. There is also some heterogeneous perfusion  suspected in the kidneys.  2. Overall stable hypoattenuating left hepatic lobe lesion which  causes diffuse hepatic biliary dilatation within the left hepatic  lobe, better characterized on prior MR from 2/11/2021. Chronic  occlusion left portal vein and involvement of the left hepatic artery  as discussed previously.  3. Persistent peripancreatic inflammatory changes, concerning  for  pancreatitis. New discrete focal outpouching of the splenic artery  adjacent to the pancreas which may represent developing  pseudoaneurysm. There is also a partially loculated fluid collection  along the greater curvature of the stomach similar in size to prior  study which may represent developing pseudocyst.  4. Reactive inflammatory changes about the ascending colon with  associated borderline enlarged fluid-filled loops of small bowel,  concerning for reactive ileus.  5. Bilateral pleural effusions with associated compressive  atelectasis.  6. Increased diffuse ascites and anasarca consistent with overall  worsening third spacing of fluid.  7. No air containing collection.    Imaging findings discussed with Dr. Prather by Dr. Arturo Hernandez at  12:30AM on 21.    I have personally reviewed the examination and initial interpretation  and I agree with the findings.    ANDERSON GUTIERREZ MD   Echo Complete    Narrative    [id number]  [id number]  [id number]  [id number]^BESSY^VANNA           [facility],[place]  Echocardiography Laboratory  [address] [place] [id number]     Name: GORDY CAMP  MRN: [id number]  : 1955  Study Date: 2021 11:56 AM  Age: 66 yrs  Gender: Male  [place] [facility] [place] For Study: Other, Please Specify in Comments  Ordering Physician: VANNA DOHERTY  Performed By: Alyssa Norris RDCS     BSA: 1.7 m2  Height: 63 in  Weight: 151 lb  HR: 104  BP: 106/51 mmHg  _____________________________________________________________________________  __        Procedure  Complete Portable Echo Adult. Contrast Optison. Patient was given 5 ml mixture  of 3 ml Optison and 6 ml saline. 4 ml wasted.  _____________________________________________________________________________  __        Interpretation Summary  The Ejection Fraction was calculated using Bi-plane contrast at 35% and  visually is estimated at 30-35% with moder diffuse hypokinesis  normal size and  wall thickness.     Global right ventricular function is mildly to moderately reduced     Trivial pericardial effusion is present.     No significant valvular abnormalities present.     There is no prior study for direct comparison.  _____________________________________________________________________________  __        Left Ventricle  Left ventricular wall thickness is normal. Left ventricular size is normal.  The Ejection Fraction is estimated at 30-35%. Left ventricular diastolic  function is normal. The Ejection Fraction was calculated using Bi-plane  contrast. Moderate diffuse hypokinesis is present.     Right Ventricle  Right ventricular wall thickness is normal. Global right ventricular function  is mildly to moderately reduced.     Atria  The atrial septum is intact as assessed by color Doppler .     Mitral Valve  The mitral valve is normal. Trace mitral insufficiency is present.        Aortic Valve  Aortic valve is normal in structure and function. The aortic valve is  tricuspid.     Tricuspid Valve  Trace tricuspid insufficiency is present. The right ventricular systolic  pressure is approximated at 9.6 mmHg plus the right atrial pressure.     Pulmonic Valve  pulmonic valve is not well vizualized.     Vessels  Sinuses of Valsalva 3.1 cm. Ascending aorta 2.8 cm. IVC diameter <2.1 cm  collapsing >50% with sniff suggests a normal RA pressure of 3 mmHg.     Pericardium  Prominent epicardial fat is noted. Trivial pericardial effusion is present.        Miscellaneous  No significant valvular abnormalities present.     Compared to Previous Study  There is no prior study for direct comparison.  _____________________________________________________________________________  __     MMode/2D Measurements & Calculations     EF(MOD-bp): 34.3 %  LA Volume Index (BP): 21.6 ml/m2  TAPSE: 1.2 cm        Doppler Measurements & Calculations  MV E max oseas: 79.6 cm/sec  MV A max oseas: 122.6 cm/sec  MV E/A:  0.65  MV dec slope: 776.6 cm/sec2  MV dec time: 0.10 sec     PA acc time: 0.09 sec  TR max oseas: 154.8 cm/sec  TR max P.6 mmHg  E/E' av.7  Lateral E/e': 14.1  Medial E/e': 15.3     _____________________________________________________________________________  __           Report approved by: Awais Jacobs 2021 01:36 PM            Consultations This Hospital Stay   PHARMACY TO DOSE VANCO  PHARMACY TO DOSE VANCO  GI PANCREATICOBILIARY ADULT IP CONSULT  NUTRITION SERVICES ADULT IP CONSULT  NEPHROLOGY ICU IP CONSULT  NEPHROLOGY ICU IP CONSULT  PHARMACY CRRT IP CONSULT  VASCULAR ACCESS CARE ADULT IP CONSULT  VASCULAR ACCESS CARE ADULT IP CONSULT  VASCULAR ACCESS CARE ADULT IP CONSULT  WOUND OSTOMY CONTINENCE NURSE  IP CONSULT  PHARMACY CRRT IP CONSULT  PHARMACY CRRT IP CONSULT  SURGERY GENERAL ADULT IP CONSULT  VASCULAR ACCESS CARE ADULT IP CONSULT  PHARMACY IP CONSULT    Primary Care Physician   Olvin Alvarado

## 2021-02-26 LAB — INTERPRETATION ECG - MUSE: NORMAL

## 2021-02-27 LAB
BACTERIA SPEC CULT: NO GROWTH
BACTERIA SPEC CULT: NO GROWTH
SPECIMEN SOURCE: NORMAL
SPECIMEN SOURCE: NORMAL

## 2021-03-01 ENCOUNTER — PRE VISIT (OUTPATIENT)
Facility: CLINIC | Age: 66
End: 2021-03-01

## 2021-03-01 LAB
BACTERIA SPEC CULT: NO GROWTH
BACTERIA SPEC CULT: NO GROWTH
Lab: NORMAL
Lab: NORMAL
SPECIMEN SOURCE: NORMAL
SPECIMEN SOURCE: NORMAL

## 2021-05-17 NOTE — TELEPHONE ENCOUNTER
ONCOLOGY INTAKE: Records Information      APPT INFORMATION:  Referring provider:  Dr. Glenn Fung  Referring provider s clinic:  [facility]   Reason for visit/diagnosis:  Liver mass, left lobe   Has patient been notified of appointment date and time?: Yes    RECORDS INFORMATION:  Were the records received with the referral (via [employer])? No,Internal Referral      Has patient been seen for any external appt for this diagnosis? No    If yes, where? NA      ADDITIONAL INFORMATION:  None    
- - -

## (undated) DEVICE — KIT CONNECTOR FOR OLYMPUS ENDOSCOPES DEFENDO 100310

## (undated) DEVICE — KIT ENDO FIRST STEP DISINFECTANT 200ML W/POUCH EP-4

## (undated) DEVICE — BIOPSY VALVE BIOSHIELD 00711135

## (undated) DEVICE — GUIDEWIRE NOVAGOLD .018X260CM STR TIP M00552000

## (undated) DEVICE — SOL WATER IRRIG 1000ML BOTTLE 2F7114

## (undated) DEVICE — WIRE GUIDE 0.025"X270CM ANG VISIGLIDE G-240-2527A

## (undated) DEVICE — TUBING SUCTION 10'X3/16" N510

## (undated) DEVICE — ENDO BITE BLOCK ADULT OMNI-BLOC

## (undated) DEVICE — SUCTION MANIFOLD NEPTUNE 2 SYS 4 PORT 0702-020-000

## (undated) DEVICE — ENDO DEVICE LOCKING AND BIOPSY CAP M00545261

## (undated) DEVICE — LABEL MEDICATION SYSTEM 3303-P

## (undated) DEVICE — WIRE GUIDE 0.025"X270CM STR VISIGLIDE G-240-2527S

## (undated) DEVICE — CATH RETRIEVAL BALLOON EXTRACTOR PRO RX-S INJ ABOVE 9-12MM

## (undated) DEVICE — ENDO FUSION OMNI-TOME 21 FS-OMNI-21 G48675

## (undated) DEVICE — PACK ENDOSCOPY GI CUSTOM UMMC

## (undated) DEVICE — ENDO FUSION OMNI-TOME G31903

## (undated) DEVICE — STENT ZIMMON BILIARY 07FRX09CM DBL PIGTAIL
Type: IMPLANTABLE DEVICE | Site: BILE DUCT | Status: NON-FUNCTIONAL
Removed: 2021-02-19

## (undated) DEVICE — ENDO NDL ASPIRATION ULTRASOUND 22GA ACQUIRE M00555540

## (undated) DEVICE — ENDO TUBING CO2 SMARTCAP STERILE DISP 100145CO2EXT

## (undated) DEVICE — ESU GROUND PAD ADULT W/CORD E7507

## (undated) DEVICE — INTR ENDOSCOPIC STENT FUSION OASIS 09.0FRX200CM

## (undated) DEVICE — PAD CHUX UNDERPAD 23X24" 7136

## (undated) DEVICE — ENDO PROBE COVER ULTRASOUND BALLOON LATEX  MAJ-249

## (undated) RX ORDER — FENTANYL CITRATE 50 UG/ML
INJECTION, SOLUTION INTRAMUSCULAR; INTRAVENOUS
Status: DISPENSED
Start: 2021-01-01

## (undated) RX ORDER — DEXAMETHASONE SODIUM PHOSPHATE 4 MG/ML
INJECTION, SOLUTION INTRA-ARTICULAR; INTRALESIONAL; INTRAMUSCULAR; INTRAVENOUS; SOFT TISSUE
Status: DISPENSED
Start: 2021-01-01

## (undated) RX ORDER — ONDANSETRON 2 MG/ML
INJECTION INTRAMUSCULAR; INTRAVENOUS
Status: DISPENSED
Start: 2021-01-01